# Patient Record
Sex: FEMALE | Race: WHITE | NOT HISPANIC OR LATINO | Employment: OTHER | URBAN - NONMETROPOLITAN AREA
[De-identification: names, ages, dates, MRNs, and addresses within clinical notes are randomized per-mention and may not be internally consistent; named-entity substitution may affect disease eponyms.]

---

## 2021-01-10 ENCOUNTER — IMMUNIZATION (OUTPATIENT)
Dept: OBSTETRICS AND GYNECOLOGY | Facility: CLINIC | Age: 86
End: 2021-01-10
Payer: MEDICARE

## 2021-01-10 DIAGNOSIS — Z23 NEED FOR VACCINATION: ICD-10-CM

## 2021-01-10 PROCEDURE — 91300 COVID-19, MRNA, LNP-S, PF, 30 MCG/0.3 ML DOSE VACCINE: CPT | Mod: PBBFAC

## 2021-01-31 ENCOUNTER — IMMUNIZATION (OUTPATIENT)
Dept: OBSTETRICS AND GYNECOLOGY | Facility: CLINIC | Age: 86
End: 2021-01-31
Payer: MEDICARE

## 2021-01-31 DIAGNOSIS — Z23 NEED FOR VACCINATION: Primary | ICD-10-CM

## 2021-01-31 PROCEDURE — 91300 COVID-19, MRNA, LNP-S, PF, 30 MCG/0.3 ML DOSE VACCINE: CPT | Mod: PBBFAC

## 2021-01-31 PROCEDURE — 0002A COVID-19, MRNA, LNP-S, PF, 30 MCG/0.3 ML DOSE VACCINE: CPT | Mod: PBBFAC

## 2021-04-06 ENCOUNTER — HOSPITAL ENCOUNTER (EMERGENCY)
Facility: HOSPITAL | Age: 86
Discharge: HOME | End: 2021-04-06
Attending: FAMILY MEDICINE
Payer: MEDICARE

## 2021-04-06 VITALS
OXYGEN SATURATION: 98 % | RESPIRATION RATE: 20 BRPM | DIASTOLIC BLOOD PRESSURE: 91 MMHG | TEMPERATURE: 98 F | WEIGHT: 101.81 LBS | BODY MASS INDEX: 16.36 KG/M2 | SYSTOLIC BLOOD PRESSURE: 164 MMHG | HEIGHT: 66 IN | HEART RATE: 88 BPM

## 2021-04-06 DIAGNOSIS — S16.1XXA CERVICAL STRAIN, ACUTE, INITIAL ENCOUNTER: Primary | ICD-10-CM

## 2021-04-06 PROCEDURE — 63600175 PHARM REV CODE 636 W HCPCS: Performed by: NURSE PRACTITIONER

## 2021-04-06 PROCEDURE — 96372 THER/PROPH/DIAG INJ SC/IM: CPT

## 2021-04-06 PROCEDURE — 25000003 PHARM REV CODE 250: Performed by: NURSE PRACTITIONER

## 2021-04-06 PROCEDURE — 99284 EMERGENCY DEPT VISIT MOD MDM: CPT | Mod: 25

## 2021-04-06 RX ORDER — LEVOTHYROXINE SODIUM 88 UG/1
CAPSULE ORAL
COMMUNITY
End: 2022-03-28 | Stop reason: SDUPTHER

## 2021-04-06 RX ORDER — METHOCARBAMOL 750 MG/1
750 TABLET, FILM COATED ORAL 3 TIMES DAILY
Qty: 30 TABLET | Refills: 0 | Status: SHIPPED | OUTPATIENT
Start: 2021-04-06 | End: 2021-04-16

## 2021-04-06 RX ORDER — VITAMIN B COMPLEX
1 CAPSULE ORAL DAILY
COMMUNITY
End: 2022-03-29

## 2021-04-06 RX ORDER — AA/PROT/LYSINE/METHIO/VIT C/B6 50-12.5 MG
10 TABLET ORAL DAILY
COMMUNITY
End: 2022-03-29

## 2021-04-06 RX ORDER — ACETAMINOPHEN 500 MG
5000 TABLET ORAL DAILY
COMMUNITY

## 2021-04-06 RX ORDER — METHOCARBAMOL 500 MG/1
500 TABLET, FILM COATED ORAL
Status: COMPLETED | OUTPATIENT
Start: 2021-04-06 | End: 2021-04-06

## 2021-04-06 RX ORDER — DIAZEPAM 2 MG/1
2 TABLET ORAL
COMMUNITY
End: 2022-03-29

## 2021-04-06 RX ORDER — NICOTINE POLACRILEX 2 MG
GUM BUCCAL DAILY
COMMUNITY
End: 2022-03-29

## 2021-04-06 RX ORDER — KETOROLAC TROMETHAMINE 30 MG/ML
15 INJECTION, SOLUTION INTRAMUSCULAR; INTRAVENOUS
Status: COMPLETED | OUTPATIENT
Start: 2021-04-06 | End: 2021-04-06

## 2021-04-06 RX ORDER — SIMVASTATIN 40 MG/1
40 TABLET, FILM COATED ORAL NIGHTLY
COMMUNITY
End: 2022-04-25 | Stop reason: SDUPTHER

## 2021-04-06 RX ORDER — LOSARTAN POTASSIUM 100 MG/1
50 TABLET ORAL DAILY
COMMUNITY
End: 2022-06-24

## 2021-04-06 RX ORDER — HYDROXYCHLOROQUINE SULFATE 200 MG/1
200 TABLET, FILM COATED ORAL EVERY OTHER DAY
COMMUNITY
End: 2023-12-18

## 2021-04-06 RX ORDER — NAPROXEN SODIUM 220 MG
220 TABLET ORAL DAILY PRN
COMMUNITY
End: 2022-03-29

## 2021-04-06 RX ORDER — PREDNISONE 2.5 MG/1
2.5 TABLET ORAL DAILY
COMMUNITY
End: 2022-03-28 | Stop reason: SDUPTHER

## 2021-04-06 RX ORDER — VERAPAMIL HYDROCHLORIDE 240 MG/1
240 CAPSULE, EXTENDED RELEASE ORAL DAILY
COMMUNITY
End: 2022-03-29

## 2021-04-06 RX ORDER — CALCIUM CARBONATE 600 MG
600 TABLET ORAL 2 TIMES DAILY WITH MEALS
COMMUNITY
End: 2022-03-29

## 2021-04-06 RX ORDER — METHOCARBAMOL 750 MG/1
750 TABLET, FILM COATED ORAL 3 TIMES DAILY
Qty: 30 TABLET | Refills: 0 | Status: SHIPPED | OUTPATIENT
Start: 2021-04-06 | End: 2021-04-06 | Stop reason: SDUPTHER

## 2021-04-06 RX ORDER — VENLAFAXINE HYDROCHLORIDE 75 MG/1
75 CAPSULE, EXTENDED RELEASE ORAL DAILY
COMMUNITY
End: 2022-03-29

## 2021-04-06 RX ORDER — FAMOTIDINE 20 MG/1
20 TABLET, FILM COATED ORAL 2 TIMES DAILY
COMMUNITY
End: 2022-05-08

## 2021-04-06 RX ADMIN — KETOROLAC TROMETHAMINE 15 MG: 30 INJECTION, SOLUTION INTRAMUSCULAR at 12:04

## 2021-04-06 RX ADMIN — METHOCARBAMOL TABLETS 500 MG: 500 TABLET, COATED ORAL at 12:04

## 2021-05-03 DIAGNOSIS — M54.2 NECK PAIN: Primary | ICD-10-CM

## 2021-05-06 ENCOUNTER — HOSPITAL ENCOUNTER (OUTPATIENT)
Dept: RADIOLOGY | Facility: HOSPITAL | Age: 86
Discharge: HOME | End: 2021-05-06
Attending: INTERNAL MEDICINE
Payer: MEDICARE

## 2021-05-06 DIAGNOSIS — M54.2 NECK PAIN: ICD-10-CM

## 2021-05-06 PROCEDURE — 72125 CT NECK SPINE W/O DYE: CPT | Mod: TC

## 2021-05-11 DIAGNOSIS — M54.2 CERVICALGIA: Primary | ICD-10-CM

## 2021-05-11 DIAGNOSIS — M54.50 LUMBAR PAIN: Primary | ICD-10-CM

## 2021-05-17 ENCOUNTER — HOSPITAL ENCOUNTER (INPATIENT)
Facility: HOSPITAL | Age: 86
LOS: 7 days | Discharge: NURSING, REHAB OR LONG-TERM CARE | DRG: 690 | End: 2021-05-24
Attending: EMERGENCY MEDICINE | Admitting: EMERGENCY MEDICINE
Payer: MEDICARE

## 2021-05-17 DIAGNOSIS — R53.1 WEAKNESS: ICD-10-CM

## 2021-05-17 DIAGNOSIS — R79.89 ELEVATED TROPONIN: ICD-10-CM

## 2021-05-17 DIAGNOSIS — N12 PYELONEPHRITIS: Primary | ICD-10-CM

## 2021-05-17 PROBLEM — E86.0 DEHYDRATION: Status: ACTIVE | Noted: 2021-05-17

## 2021-05-17 PROBLEM — I10 HYPERTENSION: Status: ACTIVE | Noted: 2021-05-17

## 2021-05-17 PROBLEM — M19.90 ARTHRITIS: Status: ACTIVE | Noted: 2021-05-17
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
## 2021-05-17 LAB
ALBUMIN SERPL BCP-MCNC: 3.3 G/DL (ref 3.5–5.2)
ALP SERPL-CCNC: 47 U/L (ref 55–135)
ALT SERPL W/O P-5'-P-CCNC: 25 U/L (ref 10–44)
ANION GAP SERPL CALC-SCNC: 14 MMOL/L (ref 8–16)
AST SERPL-CCNC: 24 U/L (ref 10–40)
BACTERIA #/AREA URNS HPF: ABNORMAL /HPF
BASOPHILS # BLD AUTO: 0.02 K/UL (ref 0–0.2)
BASOPHILS NFR BLD: 0.1 % (ref 0–1.9)
BILIRUB SERPL-MCNC: 0.8 MG/DL (ref 0.1–1)
BILIRUB UR QL STRIP: NEGATIVE
BUN SERPL-MCNC: 17 MG/DL (ref 8–23)
CALCIUM SERPL-MCNC: 9.2 MG/DL (ref 8.7–10.5)
CHLORIDE SERPL-SCNC: 101 MMOL/L (ref 95–110)
CLARITY UR: ABNORMAL
CO2 SERPL-SCNC: 27 MMOL/L (ref 23–29)
COLOR UR: YELLOW
CREAT SERPL-MCNC: 1 MG/DL (ref 0.5–1.4)
CTP QC/QA: YES
CTP QC/QA: YES
DIFFERENTIAL METHOD: ABNORMAL
EOSINOPHIL # BLD AUTO: 0 K/UL (ref 0–0.5)
EOSINOPHIL NFR BLD: 0 % (ref 0–8)
ERYTHROCYTE [DISTWIDTH] IN BLOOD BY AUTOMATED COUNT: 13 % (ref 11.5–14.5)
EST. GFR  (AFRICAN AMERICAN): >60 ML/MIN/1.73 M^2
EST. GFR  (NON AFRICAN AMERICAN): 52.2 ML/MIN/1.73 M^2
GLUCOSE SERPL-MCNC: 124 MG/DL (ref 70–110)
GLUCOSE UR QL STRIP: NEGATIVE
HCT VFR BLD AUTO: 34.4 % (ref 37–48.5)
HGB BLD-MCNC: 11.2 G/DL (ref 12–16)
HGB UR QL STRIP: ABNORMAL
HYALINE CASTS #/AREA URNS LPF: 1 /LPF
IMM GRANULOCYTES # BLD AUTO: 0.08 K/UL (ref 0–0.04)
IMM GRANULOCYTES NFR BLD AUTO: 0.5 % (ref 0–0.5)
KETONES UR QL STRIP: ABNORMAL
LACTATE SERPL-SCNC: 1.1 MMOL/L (ref 0.5–2.2)
LEUKOCYTE ESTERASE UR QL STRIP: ABNORMAL
LIPASE SERPL-CCNC: 47 U/L (ref 23–300)
LYMPHOCYTES # BLD AUTO: 0.4 K/UL (ref 1–4.8)
LYMPHOCYTES NFR BLD: 2.2 % (ref 18–48)
MCH RBC QN AUTO: 31.8 PG (ref 27–31)
MCHC RBC AUTO-ENTMCNC: 32.6 G/DL (ref 32–36)
MCV RBC AUTO: 98 FL (ref 82–98)
MICROSCOPIC COMMENT: ABNORMAL
MONOCYTES # BLD AUTO: 1.4 K/UL (ref 0.3–1)
MONOCYTES NFR BLD: 8.9 % (ref 4–15)
NEUTROPHILS # BLD AUTO: 13.8 K/UL (ref 1.8–7.7)
NEUTROPHILS NFR BLD: 88.3 % (ref 38–73)
NITRITE UR QL STRIP: POSITIVE
NRBC BLD-RTO: 0 /100 WBC
NT-PROBNP SERPL-MCNC: 588 PG/ML (ref 5–1800)
PH UR STRIP: 6 [PH] (ref 5–8)
PLATELET # BLD AUTO: 205 K/UL (ref 150–450)
PMV BLD AUTO: 9.9 FL (ref 9.2–12.9)
POC MOLECULAR INFLUENZA A AGN: NEGATIVE
POC MOLECULAR INFLUENZA B AGN: NEGATIVE
POTASSIUM SERPL-SCNC: 3.6 MMOL/L (ref 3.5–5.1)
PROT SERPL-MCNC: 6.6 G/DL (ref 6–8.4)
PROT UR QL STRIP: ABNORMAL
RBC # BLD AUTO: 3.52 M/UL (ref 4–5.4)
RBC #/AREA URNS HPF: 13 /HPF (ref 0–4)
SARS-COV-2 RDRP RESP QL NAA+PROBE: NEGATIVE
SODIUM SERPL-SCNC: 142 MMOL/L (ref 136–145)
SP GR UR STRIP: 1.01 (ref 1–1.03)
SQUAMOUS #/AREA URNS HPF: 1 /HPF
TROPONIN I SERPL DL<=0.01 NG/ML-MCNC: 0.12 NG/ML (ref 0–0.03)
TROPONIN I SERPL DL<=0.01 NG/ML-MCNC: 0.15 NG/ML (ref 0–0.03)
TROPONIN I SERPL DL<=0.01 NG/ML-MCNC: 0.15 NG/ML (ref 0–0.03)
URN SPEC COLLECT METH UR: ABNORMAL
UROBILINOGEN UR STRIP-ACNC: 1 EU/DL
WBC # BLD AUTO: 15.66 K/UL (ref 3.9–12.7)
WBC #/AREA URNS HPF: >100 /HPF (ref 0–5)

## 2021-05-17 PROCEDURE — 96375 TX/PRO/DX INJ NEW DRUG ADDON: CPT

## 2021-05-17 PROCEDURE — 63600175 PHARM REV CODE 636 W HCPCS: Performed by: EMERGENCY MEDICINE

## 2021-05-17 PROCEDURE — 85025 COMPLETE CBC W/AUTO DIFF WBC: CPT | Performed by: EMERGENCY MEDICINE

## 2021-05-17 PROCEDURE — 36415 COLL VENOUS BLD VENIPUNCTURE: CPT | Performed by: NURSE PRACTITIONER

## 2021-05-17 PROCEDURE — 36415 COLL VENOUS BLD VENIPUNCTURE: CPT | Performed by: EMERGENCY MEDICINE

## 2021-05-17 PROCEDURE — 87186 SC STD MICRODIL/AGAR DIL: CPT | Performed by: EMERGENCY MEDICINE

## 2021-05-17 PROCEDURE — U0002 COVID-19 LAB TEST NON-CDC: HCPCS | Performed by: EMERGENCY MEDICINE

## 2021-05-17 PROCEDURE — 25000003 PHARM REV CODE 250: Performed by: NURSE PRACTITIONER

## 2021-05-17 PROCEDURE — 96372 THER/PROPH/DIAG INJ SC/IM: CPT | Mod: 59

## 2021-05-17 PROCEDURE — 87088 URINE BACTERIA CULTURE: CPT | Performed by: EMERGENCY MEDICINE

## 2021-05-17 PROCEDURE — 83605 ASSAY OF LACTIC ACID: CPT | Performed by: EMERGENCY MEDICINE

## 2021-05-17 PROCEDURE — 83880 ASSAY OF NATRIURETIC PEPTIDE: CPT | Performed by: EMERGENCY MEDICINE

## 2021-05-17 PROCEDURE — 96365 THER/PROPH/DIAG IV INF INIT: CPT

## 2021-05-17 PROCEDURE — 93005 ELECTROCARDIOGRAM TRACING: CPT

## 2021-05-17 PROCEDURE — 63600175 PHARM REV CODE 636 W HCPCS: Performed by: NURSE PRACTITIONER

## 2021-05-17 PROCEDURE — 84484 ASSAY OF TROPONIN QUANT: CPT | Mod: 91 | Performed by: EMERGENCY MEDICINE

## 2021-05-17 PROCEDURE — 84484 ASSAY OF TROPONIN QUANT: CPT | Performed by: EMERGENCY MEDICINE

## 2021-05-17 PROCEDURE — 99285 EMERGENCY DEPT VISIT HI MDM: CPT | Mod: 25

## 2021-05-17 PROCEDURE — 87077 CULTURE AEROBIC IDENTIFY: CPT | Performed by: EMERGENCY MEDICINE

## 2021-05-17 PROCEDURE — 83690 ASSAY OF LIPASE: CPT | Performed by: EMERGENCY MEDICINE

## 2021-05-17 PROCEDURE — 81000 URINALYSIS NONAUTO W/SCOPE: CPT | Performed by: EMERGENCY MEDICINE

## 2021-05-17 PROCEDURE — 25000003 PHARM REV CODE 250: Performed by: EMERGENCY MEDICINE

## 2021-05-17 PROCEDURE — 84484 ASSAY OF TROPONIN QUANT: CPT | Mod: 91 | Performed by: NURSE PRACTITIONER

## 2021-05-17 PROCEDURE — 87086 URINE CULTURE/COLONY COUNT: CPT | Performed by: EMERGENCY MEDICINE

## 2021-05-17 PROCEDURE — 11000001 HC ACUTE MED/SURG PRIVATE ROOM

## 2021-05-17 PROCEDURE — 80053 COMPREHEN METABOLIC PANEL: CPT | Performed by: EMERGENCY MEDICINE

## 2021-05-17 PROCEDURE — 96361 HYDRATE IV INFUSION ADD-ON: CPT

## 2021-05-17 RX ORDER — LEVOTHYROXINE SODIUM 88 UG/1
88 TABLET ORAL
Status: DISCONTINUED | OUTPATIENT
Start: 2021-05-18 | End: 2021-05-24 | Stop reason: HOSPADM

## 2021-05-17 RX ORDER — MORPHINE SULFATE 2 MG/ML
2 INJECTION, SOLUTION INTRAMUSCULAR; INTRAVENOUS
Status: COMPLETED | OUTPATIENT
Start: 2021-05-17 | End: 2021-05-17

## 2021-05-17 RX ORDER — ACETAMINOPHEN 325 MG/1
650 TABLET ORAL EVERY 8 HOURS PRN
Status: DISCONTINUED | OUTPATIENT
Start: 2021-05-17 | End: 2021-05-24 | Stop reason: HOSPADM

## 2021-05-17 RX ORDER — POLYETHYLENE GLYCOL 3350 17 G/17G
17 POWDER, FOR SOLUTION ORAL DAILY
Status: DISCONTINUED | OUTPATIENT
Start: 2021-05-17 | End: 2021-05-24 | Stop reason: HOSPADM

## 2021-05-17 RX ORDER — HYDROCODONE BITARTRATE AND ACETAMINOPHEN 5; 325 MG/1; MG/1
1 TABLET ORAL EVERY 4 HOURS PRN
Status: DISCONTINUED | OUTPATIENT
Start: 2021-05-17 | End: 2021-05-24 | Stop reason: HOSPADM

## 2021-05-17 RX ORDER — VERAPAMIL HYDROCHLORIDE 240 MG/1
240 TABLET, FILM COATED, EXTENDED RELEASE ORAL NIGHTLY
Status: ON HOLD | COMMUNITY
End: 2021-07-06 | Stop reason: HOSPADM

## 2021-05-17 RX ORDER — ENOXAPARIN SODIUM 100 MG/ML
1 INJECTION SUBCUTANEOUS
Status: COMPLETED | OUTPATIENT
Start: 2021-05-17 | End: 2021-05-17

## 2021-05-17 RX ORDER — TALC
6 POWDER (GRAM) TOPICAL NIGHTLY PRN
Status: DISCONTINUED | OUTPATIENT
Start: 2021-05-17 | End: 2021-05-24 | Stop reason: HOSPADM

## 2021-05-17 RX ORDER — ONDANSETRON 2 MG/ML
4 INJECTION INTRAMUSCULAR; INTRAVENOUS EVERY 8 HOURS PRN
Status: DISCONTINUED | OUTPATIENT
Start: 2021-05-17 | End: 2021-05-24 | Stop reason: HOSPADM

## 2021-05-17 RX ORDER — MORPHINE SULFATE 4 MG/ML
4 INJECTION, SOLUTION INTRAMUSCULAR; INTRAVENOUS EVERY 4 HOURS PRN
Status: DISCONTINUED | OUTPATIENT
Start: 2021-05-17 | End: 2021-05-24 | Stop reason: HOSPADM

## 2021-05-17 RX ORDER — LOSARTAN POTASSIUM 50 MG/1
50 TABLET ORAL DAILY
COMMUNITY
End: 2022-03-28 | Stop reason: SDUPTHER

## 2021-05-17 RX ORDER — NAPROXEN SODIUM 220 MG/1
324 TABLET, FILM COATED ORAL
Status: COMPLETED | OUTPATIENT
Start: 2021-05-17 | End: 2021-05-17

## 2021-05-17 RX ORDER — SIMVASTATIN 40 MG/1
40 TABLET, FILM COATED ORAL NIGHTLY
Status: DISCONTINUED | OUTPATIENT
Start: 2021-05-17 | End: 2021-05-24 | Stop reason: HOSPADM

## 2021-05-17 RX ORDER — VERAPAMIL HYDROCHLORIDE 120 MG/1
240 TABLET, FILM COATED, EXTENDED RELEASE ORAL NIGHTLY
Status: DISCONTINUED | OUTPATIENT
Start: 2021-05-17 | End: 2021-05-24 | Stop reason: HOSPADM

## 2021-05-17 RX ORDER — LEVOTHYROXINE SODIUM 88 UG/1
88 TABLET ORAL
COMMUNITY
End: 2022-08-15

## 2021-05-17 RX ORDER — SODIUM CHLORIDE 9 MG/ML
INJECTION, SOLUTION INTRAVENOUS CONTINUOUS
Status: DISCONTINUED | OUTPATIENT
Start: 2021-05-17 | End: 2021-05-21

## 2021-05-17 RX ORDER — VENLAFAXINE 75 MG/1
75 TABLET ORAL DAILY
Status: ON HOLD | COMMUNITY
End: 2021-07-06 | Stop reason: HOSPADM

## 2021-05-17 RX ORDER — ONDANSETRON 2 MG/ML
8 INJECTION INTRAMUSCULAR; INTRAVENOUS
Status: COMPLETED | OUTPATIENT
Start: 2021-05-17 | End: 2021-05-17

## 2021-05-17 RX ORDER — SIMVASTATIN 40 MG/1
40 TABLET, FILM COATED ORAL NIGHTLY
COMMUNITY
End: 2022-03-28 | Stop reason: SDUPTHER

## 2021-05-17 RX ORDER — LOSARTAN POTASSIUM 50 MG/1
100 TABLET ORAL DAILY
Status: DISCONTINUED | OUTPATIENT
Start: 2021-05-17 | End: 2021-05-24 | Stop reason: HOSPADM

## 2021-05-17 RX ORDER — SUCRALFATE 1 G/1
1 TABLET ORAL
Status: DISCONTINUED | OUTPATIENT
Start: 2021-05-17 | End: 2021-05-21

## 2021-05-17 RX ORDER — HYDROXYCHLOROQUINE SULFATE 200 MG/1
TABLET, FILM COATED ORAL EVERY OTHER DAY
COMMUNITY
End: 2022-03-28 | Stop reason: SDUPTHER

## 2021-05-17 RX ORDER — SODIUM CHLORIDE 0.9 % (FLUSH) 0.9 %
10 SYRINGE (ML) INJECTION
Status: DISCONTINUED | OUTPATIENT
Start: 2021-05-17 | End: 2021-05-24 | Stop reason: HOSPADM

## 2021-05-17 RX ORDER — VENLAFAXINE 75 MG/1
75 TABLET ORAL DAILY
Status: DISCONTINUED | OUTPATIENT
Start: 2021-05-17 | End: 2021-05-24 | Stop reason: HOSPADM

## 2021-05-17 RX ORDER — ONDANSETRON 4 MG/1
8 TABLET, ORALLY DISINTEGRATING ORAL EVERY 8 HOURS PRN
Status: DISCONTINUED | OUTPATIENT
Start: 2021-05-17 | End: 2021-05-24 | Stop reason: HOSPADM

## 2021-05-17 RX ADMIN — Medication 6 MG: at 09:05

## 2021-05-17 RX ADMIN — ASPIRIN 324 MG: 81 TABLET, CHEWABLE ORAL at 01:05

## 2021-05-17 RX ADMIN — CEFTRIAXONE 1 G: 1 INJECTION, SOLUTION INTRAVENOUS at 01:05

## 2021-05-17 RX ADMIN — LOSARTAN POTASSIUM 100 MG: 50 TABLET, FILM COATED ORAL at 10:05

## 2021-05-17 RX ADMIN — CEFTRIAXONE SODIUM 1 G: 1 INJECTION, POWDER, FOR SOLUTION INTRAMUSCULAR; INTRAVENOUS at 02:05

## 2021-05-17 RX ADMIN — VERAPAMIL HYDROCHLORIDE 240 MG: 120 TABLET, FILM COATED, EXTENDED RELEASE ORAL at 09:05

## 2021-05-17 RX ADMIN — SODIUM CHLORIDE: 0.9 INJECTION, SOLUTION INTRAVENOUS at 10:05

## 2021-05-17 RX ADMIN — VENLAFAXINE 75 MG: 75 TABLET ORAL at 10:05

## 2021-05-17 RX ADMIN — SIMVASTATIN 40 MG: 40 TABLET, FILM COATED ORAL at 09:05

## 2021-05-17 RX ADMIN — SUCRALFATE 1 G: 1 TABLET ORAL at 09:05

## 2021-05-17 RX ADMIN — HYDROCODONE BITARTRATE AND ACETAMINOPHEN 1 TABLET: 5; 325 TABLET ORAL at 07:05

## 2021-05-17 RX ADMIN — SUCRALFATE 1 G: 1 TABLET ORAL at 05:05

## 2021-05-17 RX ADMIN — SODIUM CHLORIDE 1000 ML: 0.9 INJECTION, SOLUTION INTRAVENOUS at 12:05

## 2021-05-17 RX ADMIN — ONDANSETRON 8 MG: 2 INJECTION INTRAMUSCULAR; INTRAVENOUS at 12:05

## 2021-05-17 RX ADMIN — ENOXAPARIN SODIUM 50 MG: 60 INJECTION SUBCUTANEOUS at 02:05

## 2021-05-17 RX ADMIN — POLYETHYLENE GLYCOL (3350) 17 G: 17 POWDER, FOR SOLUTION ORAL at 10:05

## 2021-05-17 RX ADMIN — MORPHINE SULFATE 2 MG: 2 INJECTION, SOLUTION INTRAMUSCULAR; INTRAVENOUS at 12:05

## 2021-05-17 RX ADMIN — SUCRALFATE 1 G: 1 TABLET ORAL at 10:05

## 2021-05-18 LAB
ALBUMIN SERPL BCP-MCNC: 2.7 G/DL (ref 3.5–5.2)
ALP SERPL-CCNC: 44 U/L (ref 55–135)
ALT SERPL W/O P-5'-P-CCNC: 22 U/L (ref 10–44)
ANION GAP SERPL CALC-SCNC: 7 MMOL/L (ref 8–16)
AST SERPL-CCNC: 39 U/L (ref 10–40)
BASOPHILS # BLD AUTO: 0.02 K/UL (ref 0–0.2)
BASOPHILS NFR BLD: 0.2 % (ref 0–1.9)
BILIRUB SERPL-MCNC: 0.2 MG/DL (ref 0.1–1)
BUN SERPL-MCNC: 12 MG/DL (ref 8–23)
CALCIUM SERPL-MCNC: 8.7 MG/DL (ref 8.7–10.5)
CHLORIDE SERPL-SCNC: 109 MMOL/L (ref 95–110)
CO2 SERPL-SCNC: 27 MMOL/L (ref 23–29)
CREAT SERPL-MCNC: 0.7 MG/DL (ref 0.5–1.4)
DIFFERENTIAL METHOD: ABNORMAL
EOSINOPHIL # BLD AUTO: 0 K/UL (ref 0–0.5)
EOSINOPHIL NFR BLD: 0.2 % (ref 0–8)
ERYTHROCYTE [DISTWIDTH] IN BLOOD BY AUTOMATED COUNT: 12.9 % (ref 11.5–14.5)
EST. GFR  (AFRICAN AMERICAN): >60 ML/MIN/1.73 M^2
EST. GFR  (NON AFRICAN AMERICAN): >60 ML/MIN/1.73 M^2
GLUCOSE SERPL-MCNC: 88 MG/DL (ref 70–110)
HCT VFR BLD AUTO: 31.1 % (ref 37–48.5)
HGB BLD-MCNC: 9.9 G/DL (ref 12–16)
IMM GRANULOCYTES # BLD AUTO: 0.03 K/UL (ref 0–0.04)
IMM GRANULOCYTES NFR BLD AUTO: 0.4 % (ref 0–0.5)
LYMPHOCYTES # BLD AUTO: 0.4 K/UL (ref 1–4.8)
LYMPHOCYTES NFR BLD: 4.9 % (ref 18–48)
MAGNESIUM SERPL-MCNC: 1.7 MG/DL (ref 1.6–2.6)
MCH RBC QN AUTO: 31.5 PG (ref 27–31)
MCHC RBC AUTO-ENTMCNC: 31.8 G/DL (ref 32–36)
MCV RBC AUTO: 99 FL (ref 82–98)
MONOCYTES # BLD AUTO: 0.7 K/UL (ref 0.3–1)
MONOCYTES NFR BLD: 8.4 % (ref 4–15)
NEUTROPHILS # BLD AUTO: 7.1 K/UL (ref 1.8–7.7)
NEUTROPHILS NFR BLD: 85.9 % (ref 38–73)
NRBC BLD-RTO: 0 /100 WBC
PLATELET # BLD AUTO: 165 K/UL (ref 150–450)
PMV BLD AUTO: 10.6 FL (ref 9.2–12.9)
POCT GLUCOSE: 103 MG/DL (ref 70–110)
POTASSIUM SERPL-SCNC: 3.5 MMOL/L (ref 3.5–5.1)
PROT SERPL-MCNC: 5.8 G/DL (ref 6–8.4)
RBC # BLD AUTO: 3.14 M/UL (ref 4–5.4)
SODIUM SERPL-SCNC: 143 MMOL/L (ref 136–145)
WBC # BLD AUTO: 8.29 K/UL (ref 3.9–12.7)

## 2021-05-18 PROCEDURE — 80053 COMPREHEN METABOLIC PANEL: CPT | Performed by: NURSE PRACTITIONER

## 2021-05-18 PROCEDURE — 97162 PT EVAL MOD COMPLEX 30 MIN: CPT

## 2021-05-18 PROCEDURE — 63600175 PHARM REV CODE 636 W HCPCS: Performed by: NURSE PRACTITIONER

## 2021-05-18 PROCEDURE — 83735 ASSAY OF MAGNESIUM: CPT | Performed by: NURSE PRACTITIONER

## 2021-05-18 PROCEDURE — 97165 OT EVAL LOW COMPLEX 30 MIN: CPT

## 2021-05-18 PROCEDURE — 11000001 HC ACUTE MED/SURG PRIVATE ROOM

## 2021-05-18 PROCEDURE — 85025 COMPLETE CBC W/AUTO DIFF WBC: CPT | Performed by: NURSE PRACTITIONER

## 2021-05-18 PROCEDURE — 97530 THERAPEUTIC ACTIVITIES: CPT

## 2021-05-18 PROCEDURE — 25000003 PHARM REV CODE 250: Performed by: NURSE PRACTITIONER

## 2021-05-18 RX ADMIN — Medication 6 MG: at 09:05

## 2021-05-18 RX ADMIN — LOSARTAN POTASSIUM 100 MG: 50 TABLET, FILM COATED ORAL at 08:05

## 2021-05-18 RX ADMIN — LEVOTHYROXINE SODIUM 88 MCG: 88 TABLET ORAL at 05:05

## 2021-05-18 RX ADMIN — SUCRALFATE 1 G: 1 TABLET ORAL at 05:05

## 2021-05-18 RX ADMIN — SUCRALFATE 1 G: 1 TABLET ORAL at 11:05

## 2021-05-18 RX ADMIN — SUCRALFATE 1 G: 1 TABLET ORAL at 04:05

## 2021-05-18 RX ADMIN — CEFTRIAXONE SODIUM 1 G: 1 INJECTION, POWDER, FOR SOLUTION INTRAMUSCULAR; INTRAVENOUS at 02:05

## 2021-05-18 RX ADMIN — CEFTRIAXONE SODIUM 1 G: 1 INJECTION, POWDER, FOR SOLUTION INTRAMUSCULAR; INTRAVENOUS at 01:05

## 2021-05-18 RX ADMIN — POLYETHYLENE GLYCOL (3350) 17 G: 17 POWDER, FOR SOLUTION ORAL at 08:05

## 2021-05-18 RX ADMIN — SIMVASTATIN 40 MG: 40 TABLET, FILM COATED ORAL at 09:05

## 2021-05-18 RX ADMIN — VERAPAMIL HYDROCHLORIDE 240 MG: 120 TABLET, FILM COATED, EXTENDED RELEASE ORAL at 09:05

## 2021-05-18 RX ADMIN — SUCRALFATE 1 G: 1 TABLET ORAL at 09:05

## 2021-05-18 RX ADMIN — HYDROCODONE BITARTRATE AND ACETAMINOPHEN 1 TABLET: 5; 325 TABLET ORAL at 02:05

## 2021-05-18 RX ADMIN — VENLAFAXINE 75 MG: 75 TABLET ORAL at 08:05

## 2021-05-18 RX ADMIN — SODIUM CHLORIDE: 0.9 INJECTION, SOLUTION INTRAVENOUS at 01:05

## 2021-05-19 LAB
ALBUMIN SERPL BCP-MCNC: 2.4 G/DL (ref 3.5–5.2)
ALP SERPL-CCNC: 43 U/L (ref 55–135)
ALT SERPL W/O P-5'-P-CCNC: 21 U/L (ref 10–44)
ANION GAP SERPL CALC-SCNC: 8 MMOL/L (ref 8–16)
AST SERPL-CCNC: 32 U/L (ref 10–40)
BACTERIA UR CULT: ABNORMAL
BASOPHILS # BLD AUTO: 0.03 K/UL (ref 0–0.2)
BASOPHILS NFR BLD: 0.5 % (ref 0–1.9)
BILIRUB SERPL-MCNC: 0.2 MG/DL (ref 0.1–1)
BUN SERPL-MCNC: 10 MG/DL (ref 8–23)
CALCIUM SERPL-MCNC: 8.5 MG/DL (ref 8.7–10.5)
CHLORIDE SERPL-SCNC: 107 MMOL/L (ref 95–110)
CO2 SERPL-SCNC: 26 MMOL/L (ref 23–29)
CREAT SERPL-MCNC: 0.6 MG/DL (ref 0.5–1.4)
DIFFERENTIAL METHOD: ABNORMAL
EOSINOPHIL # BLD AUTO: 0.1 K/UL (ref 0–0.5)
EOSINOPHIL NFR BLD: 1 % (ref 0–8)
ERYTHROCYTE [DISTWIDTH] IN BLOOD BY AUTOMATED COUNT: 12.5 % (ref 11.5–14.5)
EST. GFR  (AFRICAN AMERICAN): >60 ML/MIN/1.73 M^2
EST. GFR  (NON AFRICAN AMERICAN): >60 ML/MIN/1.73 M^2
GLUCOSE SERPL-MCNC: 100 MG/DL (ref 70–110)
HCT VFR BLD AUTO: 28.6 % (ref 37–48.5)
HGB BLD-MCNC: 9.3 G/DL (ref 12–16)
IMM GRANULOCYTES # BLD AUTO: 0.01 K/UL (ref 0–0.04)
IMM GRANULOCYTES NFR BLD AUTO: 0.2 % (ref 0–0.5)
LYMPHOCYTES # BLD AUTO: 0.5 K/UL (ref 1–4.8)
LYMPHOCYTES NFR BLD: 7.2 % (ref 18–48)
MAGNESIUM SERPL-MCNC: 1.5 MG/DL (ref 1.6–2.6)
MCH RBC QN AUTO: 31.2 PG (ref 27–31)
MCHC RBC AUTO-ENTMCNC: 32.5 G/DL (ref 32–36)
MCV RBC AUTO: 96 FL (ref 82–98)
MONOCYTES # BLD AUTO: 0.8 K/UL (ref 0.3–1)
MONOCYTES NFR BLD: 12.1 % (ref 4–15)
NEUTROPHILS # BLD AUTO: 5 K/UL (ref 1.8–7.7)
NEUTROPHILS NFR BLD: 79 % (ref 38–73)
NRBC BLD-RTO: 0 /100 WBC
PLATELET # BLD AUTO: 176 K/UL (ref 150–450)
PMV BLD AUTO: 10.5 FL (ref 9.2–12.9)
POTASSIUM SERPL-SCNC: 3.2 MMOL/L (ref 3.5–5.1)
PROT SERPL-MCNC: 5.5 G/DL (ref 6–8.4)
RBC # BLD AUTO: 2.98 M/UL (ref 4–5.4)
SODIUM SERPL-SCNC: 141 MMOL/L (ref 136–145)
WBC # BLD AUTO: 6.26 K/UL (ref 3.9–12.7)

## 2021-05-19 PROCEDURE — 25000003 PHARM REV CODE 250: Performed by: INTERNAL MEDICINE

## 2021-05-19 PROCEDURE — 97530 THERAPEUTIC ACTIVITIES: CPT | Mod: CO

## 2021-05-19 PROCEDURE — 97116 GAIT TRAINING THERAPY: CPT

## 2021-05-19 PROCEDURE — 97530 THERAPEUTIC ACTIVITIES: CPT

## 2021-05-19 PROCEDURE — 80053 COMPREHEN METABOLIC PANEL: CPT | Performed by: NURSE PRACTITIONER

## 2021-05-19 PROCEDURE — 36415 COLL VENOUS BLD VENIPUNCTURE: CPT | Performed by: NURSE PRACTITIONER

## 2021-05-19 PROCEDURE — 83735 ASSAY OF MAGNESIUM: CPT | Performed by: NURSE PRACTITIONER

## 2021-05-19 PROCEDURE — 11000001 HC ACUTE MED/SURG PRIVATE ROOM

## 2021-05-19 PROCEDURE — 85025 COMPLETE CBC W/AUTO DIFF WBC: CPT | Performed by: NURSE PRACTITIONER

## 2021-05-19 PROCEDURE — 63600175 PHARM REV CODE 636 W HCPCS: Performed by: NURSE PRACTITIONER

## 2021-05-19 PROCEDURE — 25000003 PHARM REV CODE 250: Performed by: NURSE PRACTITIONER

## 2021-05-19 RX ADMIN — SIMVASTATIN 40 MG: 40 TABLET, FILM COATED ORAL at 08:05

## 2021-05-19 RX ADMIN — SODIUM CHLORIDE: 0.9 INJECTION, SOLUTION INTRAVENOUS at 04:05

## 2021-05-19 RX ADMIN — CEFTRIAXONE SODIUM 1 G: 1 INJECTION, POWDER, FOR SOLUTION INTRAMUSCULAR; INTRAVENOUS at 02:05

## 2021-05-19 RX ADMIN — VENLAFAXINE 75 MG: 75 TABLET ORAL at 08:05

## 2021-05-19 RX ADMIN — SUCRALFATE 1 G: 1 TABLET ORAL at 08:05

## 2021-05-19 RX ADMIN — SUCRALFATE 1 G: 1 TABLET ORAL at 11:05

## 2021-05-19 RX ADMIN — POLYETHYLENE GLYCOL (3350) 17 G: 17 POWDER, FOR SOLUTION ORAL at 08:05

## 2021-05-19 RX ADMIN — SUCRALFATE 1 G: 1 TABLET ORAL at 06:05

## 2021-05-19 RX ADMIN — LEVOTHYROXINE SODIUM 88 MCG: 88 TABLET ORAL at 06:05

## 2021-05-19 RX ADMIN — SUCRALFATE 1 G: 1 TABLET ORAL at 03:05

## 2021-05-19 RX ADMIN — ACETAMINOPHEN 650 MG: 325 TABLET ORAL at 03:05

## 2021-05-19 RX ADMIN — VERAPAMIL HYDROCHLORIDE 240 MG: 120 TABLET, FILM COATED, EXTENDED RELEASE ORAL at 08:05

## 2021-05-19 RX ADMIN — ONDANSETRON 8 MG: 4 TABLET, ORALLY DISINTEGRATING ORAL at 11:05

## 2021-05-19 RX ADMIN — LOSARTAN POTASSIUM 100 MG: 50 TABLET, FILM COATED ORAL at 08:05

## 2021-05-20 PROBLEM — E87.6 HYPOKALEMIA: Status: ACTIVE | Noted: 2021-05-20

## 2021-05-20 PROBLEM — R05.9 COUGH: Status: ACTIVE | Noted: 2021-05-20

## 2021-05-20 LAB
ALBUMIN SERPL BCP-MCNC: 2.4 G/DL (ref 3.5–5.2)
ALP SERPL-CCNC: 47 U/L (ref 55–135)
ALT SERPL W/O P-5'-P-CCNC: 21 U/L (ref 10–44)
ANION GAP SERPL CALC-SCNC: 5 MMOL/L (ref 8–16)
AST SERPL-CCNC: 28 U/L (ref 10–40)
BASOPHILS # BLD AUTO: 0.01 K/UL (ref 0–0.2)
BASOPHILS NFR BLD: 0.1 % (ref 0–1.9)
BILIRUB SERPL-MCNC: 0.2 MG/DL (ref 0.1–1)
BUN SERPL-MCNC: 9 MG/DL (ref 8–23)
CALCIUM SERPL-MCNC: 8.7 MG/DL (ref 8.7–10.5)
CHLORIDE SERPL-SCNC: 112 MMOL/L (ref 95–110)
CO2 SERPL-SCNC: 28 MMOL/L (ref 23–29)
CREAT SERPL-MCNC: 0.6 MG/DL (ref 0.5–1.4)
DIFFERENTIAL METHOD: ABNORMAL
EOSINOPHIL # BLD AUTO: 0.2 K/UL (ref 0–0.5)
EOSINOPHIL NFR BLD: 3 % (ref 0–8)
ERYTHROCYTE [DISTWIDTH] IN BLOOD BY AUTOMATED COUNT: 12.6 % (ref 11.5–14.5)
EST. GFR  (AFRICAN AMERICAN): >60 ML/MIN/1.73 M^2
EST. GFR  (NON AFRICAN AMERICAN): >60 ML/MIN/1.73 M^2
GLUCOSE SERPL-MCNC: 105 MG/DL (ref 70–110)
HCT VFR BLD AUTO: 29.3 % (ref 37–48.5)
HGB BLD-MCNC: 9.7 G/DL (ref 12–16)
IMM GRANULOCYTES # BLD AUTO: 0.02 K/UL (ref 0–0.04)
IMM GRANULOCYTES NFR BLD AUTO: 0.3 % (ref 0–0.5)
LYMPHOCYTES # BLD AUTO: 0.6 K/UL (ref 1–4.8)
LYMPHOCYTES NFR BLD: 7.4 % (ref 18–48)
MAGNESIUM SERPL-MCNC: 1.7 MG/DL (ref 1.6–2.6)
MCH RBC QN AUTO: 31.2 PG (ref 27–31)
MCHC RBC AUTO-ENTMCNC: 33.1 G/DL (ref 32–36)
MCV RBC AUTO: 94 FL (ref 82–98)
MONOCYTES # BLD AUTO: 0.9 K/UL (ref 0.3–1)
MONOCYTES NFR BLD: 12.5 % (ref 4–15)
NEUTROPHILS # BLD AUTO: 5.7 K/UL (ref 1.8–7.7)
NEUTROPHILS NFR BLD: 76.7 % (ref 38–73)
NRBC BLD-RTO: 0 /100 WBC
PLATELET # BLD AUTO: 205 K/UL (ref 150–450)
PMV BLD AUTO: 10.8 FL (ref 9.2–12.9)
POTASSIUM SERPL-SCNC: 3.1 MMOL/L (ref 3.5–5.1)
PROT SERPL-MCNC: 5.5 G/DL (ref 6–8.4)
RBC # BLD AUTO: 3.11 M/UL (ref 4–5.4)
SODIUM SERPL-SCNC: 145 MMOL/L (ref 136–145)
WBC # BLD AUTO: 7.43 K/UL (ref 3.9–12.7)

## 2021-05-20 PROCEDURE — 97116 GAIT TRAINING THERAPY: CPT

## 2021-05-20 PROCEDURE — 25000003 PHARM REV CODE 250: Performed by: NURSE PRACTITIONER

## 2021-05-20 PROCEDURE — 80053 COMPREHEN METABOLIC PANEL: CPT | Performed by: NURSE PRACTITIONER

## 2021-05-20 PROCEDURE — 97530 THERAPEUTIC ACTIVITIES: CPT | Mod: CO

## 2021-05-20 PROCEDURE — 97530 THERAPEUTIC ACTIVITIES: CPT

## 2021-05-20 PROCEDURE — 36415 COLL VENOUS BLD VENIPUNCTURE: CPT | Performed by: NURSE PRACTITIONER

## 2021-05-20 PROCEDURE — 83735 ASSAY OF MAGNESIUM: CPT | Performed by: NURSE PRACTITIONER

## 2021-05-20 PROCEDURE — 25000003 PHARM REV CODE 250: Performed by: INTERNAL MEDICINE

## 2021-05-20 PROCEDURE — 63600175 PHARM REV CODE 636 W HCPCS: Performed by: NURSE PRACTITIONER

## 2021-05-20 PROCEDURE — 85025 COMPLETE CBC W/AUTO DIFF WBC: CPT | Performed by: NURSE PRACTITIONER

## 2021-05-20 PROCEDURE — 11000001 HC ACUTE MED/SURG PRIVATE ROOM

## 2021-05-20 RX ORDER — GUAIFENESIN/DEXTROMETHORPHAN 100-10MG/5
10 SYRUP ORAL EVERY 4 HOURS PRN
Status: DISCONTINUED | OUTPATIENT
Start: 2021-05-20 | End: 2021-05-24 | Stop reason: HOSPADM

## 2021-05-20 RX ORDER — BENZONATATE 100 MG/1
100 CAPSULE ORAL 3 TIMES DAILY PRN
Status: DISCONTINUED | OUTPATIENT
Start: 2021-05-20 | End: 2021-05-24 | Stop reason: HOSPADM

## 2021-05-20 RX ORDER — CETIRIZINE HYDROCHLORIDE 10 MG/1
10 TABLET ORAL DAILY
Status: DISCONTINUED | OUTPATIENT
Start: 2021-05-20 | End: 2021-05-24 | Stop reason: HOSPADM

## 2021-05-20 RX ADMIN — HYDROCODONE BITARTRATE AND ACETAMINOPHEN 1 TABLET: 5; 325 TABLET ORAL at 06:05

## 2021-05-20 RX ADMIN — LEVOTHYROXINE SODIUM 88 MCG: 88 TABLET ORAL at 06:05

## 2021-05-20 RX ADMIN — HYDROCODONE BITARTRATE AND ACETAMINOPHEN 1 TABLET: 5; 325 TABLET ORAL at 09:05

## 2021-05-20 RX ADMIN — POTASSIUM BICARBONATE 20 MEQ: 391 TABLET, EFFERVESCENT ORAL at 09:05

## 2021-05-20 RX ADMIN — POLYETHYLENE GLYCOL (3350) 17 G: 17 POWDER, FOR SOLUTION ORAL at 10:05

## 2021-05-20 RX ADMIN — LOSARTAN POTASSIUM 100 MG: 50 TABLET, FILM COATED ORAL at 10:05

## 2021-05-20 RX ADMIN — SUCRALFATE 1 G: 1 TABLET ORAL at 09:05

## 2021-05-20 RX ADMIN — CEFTRIAXONE SODIUM 1 G: 1 INJECTION, POWDER, FOR SOLUTION INTRAMUSCULAR; INTRAVENOUS at 01:05

## 2021-05-20 RX ADMIN — SUCRALFATE 1 G: 1 TABLET ORAL at 04:05

## 2021-05-20 RX ADMIN — SIMVASTATIN 40 MG: 40 TABLET, FILM COATED ORAL at 09:05

## 2021-05-20 RX ADMIN — CETIRIZINE HYDROCHLORIDE 10 MG: 10 TABLET, FILM COATED ORAL at 10:05

## 2021-05-20 RX ADMIN — CEFTRIAXONE SODIUM 1 G: 1 INJECTION, POWDER, FOR SOLUTION INTRAMUSCULAR; INTRAVENOUS at 02:05

## 2021-05-20 RX ADMIN — BENZONATATE 100 MG: 100 CAPSULE ORAL at 10:05

## 2021-05-20 RX ADMIN — SUCRALFATE 1 G: 1 TABLET ORAL at 10:05

## 2021-05-20 RX ADMIN — POTASSIUM BICARBONATE 20 MEQ: 391 TABLET, EFFERVESCENT ORAL at 10:05

## 2021-05-20 RX ADMIN — SODIUM CHLORIDE: 0.9 INJECTION, SOLUTION INTRAVENOUS at 01:05

## 2021-05-20 RX ADMIN — VENLAFAXINE 75 MG: 75 TABLET ORAL at 10:05

## 2021-05-20 RX ADMIN — SUCRALFATE 1 G: 1 TABLET ORAL at 06:05

## 2021-05-20 RX ADMIN — VERAPAMIL HYDROCHLORIDE 240 MG: 120 TABLET, FILM COATED, EXTENDED RELEASE ORAL at 09:05

## 2021-05-21 LAB
ALBUMIN SERPL BCP-MCNC: 2.4 G/DL (ref 3.5–5.2)
ALP SERPL-CCNC: 51 U/L (ref 55–135)
ALT SERPL W/O P-5'-P-CCNC: 20 U/L (ref 10–44)
ANION GAP SERPL CALC-SCNC: 5 MMOL/L (ref 8–16)
AST SERPL-CCNC: 23 U/L (ref 10–40)
BASOPHILS # BLD AUTO: 0.04 K/UL (ref 0–0.2)
BASOPHILS NFR BLD: 0.5 % (ref 0–1.9)
BILIRUB SERPL-MCNC: 0.2 MG/DL (ref 0.1–1)
BUN SERPL-MCNC: 9 MG/DL (ref 8–23)
CALCIUM SERPL-MCNC: 8.8 MG/DL (ref 8.7–10.5)
CHLORIDE SERPL-SCNC: 109 MMOL/L (ref 95–110)
CO2 SERPL-SCNC: 31 MMOL/L (ref 23–29)
CREAT SERPL-MCNC: 0.5 MG/DL (ref 0.5–1.4)
DIFFERENTIAL METHOD: ABNORMAL
EOSINOPHIL # BLD AUTO: 0.3 K/UL (ref 0–0.5)
EOSINOPHIL NFR BLD: 4.6 % (ref 0–8)
ERYTHROCYTE [DISTWIDTH] IN BLOOD BY AUTOMATED COUNT: 12.5 % (ref 11.5–14.5)
EST. GFR  (AFRICAN AMERICAN): >60 ML/MIN/1.73 M^2
EST. GFR  (NON AFRICAN AMERICAN): >60 ML/MIN/1.73 M^2
GLUCOSE SERPL-MCNC: 84 MG/DL (ref 70–110)
HCT VFR BLD AUTO: 30.6 % (ref 37–48.5)
HGB BLD-MCNC: 9.9 G/DL (ref 12–16)
IMM GRANULOCYTES # BLD AUTO: 0.03 K/UL (ref 0–0.04)
IMM GRANULOCYTES NFR BLD AUTO: 0.4 % (ref 0–0.5)
LYMPHOCYTES # BLD AUTO: 1.2 K/UL (ref 1–4.8)
LYMPHOCYTES NFR BLD: 16.6 % (ref 18–48)
MAGNESIUM SERPL-MCNC: 1.7 MG/DL (ref 1.6–2.6)
MCH RBC QN AUTO: 30.9 PG (ref 27–31)
MCHC RBC AUTO-ENTMCNC: 32.4 G/DL (ref 32–36)
MCV RBC AUTO: 96 FL (ref 82–98)
MONOCYTES # BLD AUTO: 0.9 K/UL (ref 0.3–1)
MONOCYTES NFR BLD: 12.3 % (ref 4–15)
NEUTROPHILS # BLD AUTO: 4.9 K/UL (ref 1.8–7.7)
NEUTROPHILS NFR BLD: 65.6 % (ref 38–73)
NRBC BLD-RTO: 0 /100 WBC
PLATELET # BLD AUTO: 244 K/UL (ref 150–450)
PMV BLD AUTO: 10.8 FL (ref 9.2–12.9)
POTASSIUM SERPL-SCNC: 3.2 MMOL/L (ref 3.5–5.1)
PROT SERPL-MCNC: 5.4 G/DL (ref 6–8.4)
RBC # BLD AUTO: 3.2 M/UL (ref 4–5.4)
SODIUM SERPL-SCNC: 145 MMOL/L (ref 136–145)
WBC # BLD AUTO: 7.47 K/UL (ref 3.9–12.7)

## 2021-05-21 PROCEDURE — 97116 GAIT TRAINING THERAPY: CPT

## 2021-05-21 PROCEDURE — 36415 COLL VENOUS BLD VENIPUNCTURE: CPT | Performed by: NURSE PRACTITIONER

## 2021-05-21 PROCEDURE — 97530 THERAPEUTIC ACTIVITIES: CPT

## 2021-05-21 PROCEDURE — 83735 ASSAY OF MAGNESIUM: CPT | Performed by: NURSE PRACTITIONER

## 2021-05-21 PROCEDURE — 25000003 PHARM REV CODE 250: Performed by: NURSE PRACTITIONER

## 2021-05-21 PROCEDURE — 85025 COMPLETE CBC W/AUTO DIFF WBC: CPT | Performed by: NURSE PRACTITIONER

## 2021-05-21 PROCEDURE — 25000003 PHARM REV CODE 250: Performed by: INTERNAL MEDICINE

## 2021-05-21 PROCEDURE — 11000001 HC ACUTE MED/SURG PRIVATE ROOM

## 2021-05-21 PROCEDURE — 63600175 PHARM REV CODE 636 W HCPCS: Performed by: NURSE PRACTITIONER

## 2021-05-21 PROCEDURE — 80053 COMPREHEN METABOLIC PANEL: CPT | Performed by: NURSE PRACTITIONER

## 2021-05-21 RX ORDER — SUCRALFATE 1 G/10ML
1 SUSPENSION ORAL
Status: DISCONTINUED | OUTPATIENT
Start: 2021-05-21 | End: 2021-05-24 | Stop reason: HOSPADM

## 2021-05-21 RX ORDER — NYSTATIN 100000 [USP'U]/ML
500000 SUSPENSION ORAL 4 TIMES DAILY
Status: DISCONTINUED | OUTPATIENT
Start: 2021-05-21 | End: 2021-05-24

## 2021-05-21 RX ADMIN — HYDROCODONE BITARTRATE AND ACETAMINOPHEN 1 TABLET: 5; 325 TABLET ORAL at 08:05

## 2021-05-21 RX ADMIN — SUCRALFATE 1 G: 1 TABLET ORAL at 05:05

## 2021-05-21 RX ADMIN — SUCRALFATE 1 G: 1 SUSPENSION ORAL at 08:05

## 2021-05-21 RX ADMIN — NYSTATIN 500000 UNITS: 100000 SUSPENSION ORAL at 08:05

## 2021-05-21 RX ADMIN — SUCRALFATE 1 G: 1 TABLET ORAL at 11:05

## 2021-05-21 RX ADMIN — POTASSIUM BICARBONATE 20 MEQ: 391 TABLET, EFFERVESCENT ORAL at 08:05

## 2021-05-21 RX ADMIN — NYSTATIN 500000 UNITS: 100000 SUSPENSION ORAL at 03:05

## 2021-05-21 RX ADMIN — ACETAMINOPHEN 650 MG: 325 TABLET ORAL at 08:05

## 2021-05-21 RX ADMIN — POLYETHYLENE GLYCOL (3350) 17 G: 17 POWDER, FOR SOLUTION ORAL at 08:05

## 2021-05-21 RX ADMIN — CEFTRIAXONE SODIUM 1 G: 1 INJECTION, POWDER, FOR SOLUTION INTRAMUSCULAR; INTRAVENOUS at 02:05

## 2021-05-21 RX ADMIN — SIMVASTATIN 40 MG: 40 TABLET, FILM COATED ORAL at 08:05

## 2021-05-21 RX ADMIN — VERAPAMIL HYDROCHLORIDE 240 MG: 120 TABLET, FILM COATED, EXTENDED RELEASE ORAL at 08:05

## 2021-05-21 RX ADMIN — SUCRALFATE 1 G: 1 TABLET ORAL at 07:05

## 2021-05-21 RX ADMIN — LOSARTAN POTASSIUM 100 MG: 50 TABLET, FILM COATED ORAL at 08:05

## 2021-05-21 RX ADMIN — Medication 6 MG: at 08:05

## 2021-05-21 RX ADMIN — CEFTRIAXONE SODIUM 1 G: 1 INJECTION, POWDER, FOR SOLUTION INTRAMUSCULAR; INTRAVENOUS at 03:05

## 2021-05-21 RX ADMIN — NYSTATIN 500000 UNITS: 100000 SUSPENSION ORAL at 11:05

## 2021-05-21 RX ADMIN — NYSTATIN 500000 UNITS: 100000 SUSPENSION ORAL at 05:05

## 2021-05-21 RX ADMIN — VENLAFAXINE 75 MG: 75 TABLET ORAL at 08:05

## 2021-05-21 RX ADMIN — CETIRIZINE HYDROCHLORIDE 10 MG: 10 TABLET, FILM COATED ORAL at 08:05

## 2021-05-21 RX ADMIN — GUAIFENESIN AND DEXTROMETHORPHAN 10 ML: 100; 10 SYRUP ORAL at 08:05

## 2021-05-21 RX ADMIN — LEVOTHYROXINE SODIUM 88 MCG: 88 TABLET ORAL at 07:05

## 2021-05-22 LAB
ALBUMIN SERPL BCP-MCNC: 2.8 G/DL (ref 3.5–5.2)
ALP SERPL-CCNC: 71 U/L (ref 55–135)
ALT SERPL W/O P-5'-P-CCNC: 24 U/L (ref 10–44)
ANION GAP SERPL CALC-SCNC: 6 MMOL/L (ref 8–16)
AST SERPL-CCNC: 29 U/L (ref 10–40)
BASOPHILS # BLD AUTO: 0.03 K/UL (ref 0–0.2)
BASOPHILS NFR BLD: 0.4 % (ref 0–1.9)
BILIRUB SERPL-MCNC: 0.4 MG/DL (ref 0.1–1)
BUN SERPL-MCNC: 10 MG/DL (ref 8–23)
CALCIUM SERPL-MCNC: 9.3 MG/DL (ref 8.7–10.5)
CHLORIDE SERPL-SCNC: 103 MMOL/L (ref 95–110)
CO2 SERPL-SCNC: 33 MMOL/L (ref 23–29)
CREAT SERPL-MCNC: 0.7 MG/DL (ref 0.5–1.4)
DIFFERENTIAL METHOD: ABNORMAL
EOSINOPHIL # BLD AUTO: 0.3 K/UL (ref 0–0.5)
EOSINOPHIL NFR BLD: 3.2 % (ref 0–8)
ERYTHROCYTE [DISTWIDTH] IN BLOOD BY AUTOMATED COUNT: 12.5 % (ref 11.5–14.5)
EST. GFR  (AFRICAN AMERICAN): >60 ML/MIN/1.73 M^2
EST. GFR  (NON AFRICAN AMERICAN): >60 ML/MIN/1.73 M^2
GLUCOSE SERPL-MCNC: 105 MG/DL (ref 70–110)
HCT VFR BLD AUTO: 36.5 % (ref 37–48.5)
HGB BLD-MCNC: 12 G/DL (ref 12–16)
IMM GRANULOCYTES # BLD AUTO: 0.04 K/UL (ref 0–0.04)
IMM GRANULOCYTES NFR BLD AUTO: 0.5 % (ref 0–0.5)
LYMPHOCYTES # BLD AUTO: 0.5 K/UL (ref 1–4.8)
LYMPHOCYTES NFR BLD: 6.4 % (ref 18–48)
MAGNESIUM SERPL-MCNC: 1.5 MG/DL (ref 1.6–2.6)
MCH RBC QN AUTO: 31.3 PG (ref 27–31)
MCHC RBC AUTO-ENTMCNC: 32.9 G/DL (ref 32–36)
MCV RBC AUTO: 95 FL (ref 82–98)
MONOCYTES # BLD AUTO: 0.6 K/UL (ref 0.3–1)
MONOCYTES NFR BLD: 6.7 % (ref 4–15)
NEUTROPHILS # BLD AUTO: 7 K/UL (ref 1.8–7.7)
NEUTROPHILS NFR BLD: 82.8 % (ref 38–73)
NRBC BLD-RTO: 0 /100 WBC
PLATELET # BLD AUTO: 329 K/UL (ref 150–450)
PMV BLD AUTO: 10.5 FL (ref 9.2–12.9)
POTASSIUM SERPL-SCNC: 3.6 MMOL/L (ref 3.5–5.1)
PROT SERPL-MCNC: 6.5 G/DL (ref 6–8.4)
RBC # BLD AUTO: 3.84 M/UL (ref 4–5.4)
SODIUM SERPL-SCNC: 142 MMOL/L (ref 136–145)
WBC # BLD AUTO: 8.46 K/UL (ref 3.9–12.7)

## 2021-05-22 PROCEDURE — 11000001 HC ACUTE MED/SURG PRIVATE ROOM

## 2021-05-22 PROCEDURE — 36415 COLL VENOUS BLD VENIPUNCTURE: CPT | Performed by: NURSE PRACTITIONER

## 2021-05-22 PROCEDURE — 85025 COMPLETE CBC W/AUTO DIFF WBC: CPT | Performed by: NURSE PRACTITIONER

## 2021-05-22 PROCEDURE — 25000003 PHARM REV CODE 250: Performed by: NURSE PRACTITIONER

## 2021-05-22 PROCEDURE — 80053 COMPREHEN METABOLIC PANEL: CPT | Performed by: NURSE PRACTITIONER

## 2021-05-22 PROCEDURE — 83735 ASSAY OF MAGNESIUM: CPT | Performed by: NURSE PRACTITIONER

## 2021-05-22 PROCEDURE — 25000003 PHARM REV CODE 250: Performed by: INTERNAL MEDICINE

## 2021-05-22 PROCEDURE — 63600175 PHARM REV CODE 636 W HCPCS: Performed by: NURSE PRACTITIONER

## 2021-05-22 RX ADMIN — SUCRALFATE 1 G: 1 SUSPENSION ORAL at 11:05

## 2021-05-22 RX ADMIN — SIMVASTATIN 40 MG: 40 TABLET, FILM COATED ORAL at 09:05

## 2021-05-22 RX ADMIN — ONDANSETRON 8 MG: 4 TABLET, ORALLY DISINTEGRATING ORAL at 03:05

## 2021-05-22 RX ADMIN — CEFTRIAXONE SODIUM 1 G: 1 INJECTION, POWDER, FOR SOLUTION INTRAMUSCULAR; INTRAVENOUS at 02:05

## 2021-05-22 RX ADMIN — POLYETHYLENE GLYCOL (3350) 17 G: 17 POWDER, FOR SOLUTION ORAL at 09:05

## 2021-05-22 RX ADMIN — LEVOTHYROXINE SODIUM 88 MCG: 88 TABLET ORAL at 05:05

## 2021-05-22 RX ADMIN — SUCRALFATE 1 G: 1 SUSPENSION ORAL at 09:05

## 2021-05-22 RX ADMIN — ACETAMINOPHEN 650 MG: 325 TABLET ORAL at 05:05

## 2021-05-22 RX ADMIN — SUCRALFATE 1 G: 1 SUSPENSION ORAL at 02:05

## 2021-05-22 RX ADMIN — CETIRIZINE HYDROCHLORIDE 10 MG: 10 TABLET, FILM COATED ORAL at 09:05

## 2021-05-22 RX ADMIN — POTASSIUM BICARBONATE 20 MEQ: 391 TABLET, EFFERVESCENT ORAL at 09:05

## 2021-05-22 RX ADMIN — NYSTATIN 500000 UNITS: 100000 SUSPENSION ORAL at 09:05

## 2021-05-22 RX ADMIN — HYDROCODONE BITARTRATE AND ACETAMINOPHEN 1 TABLET: 5; 325 TABLET ORAL at 09:05

## 2021-05-22 RX ADMIN — VERAPAMIL HYDROCHLORIDE 240 MG: 120 TABLET, FILM COATED, EXTENDED RELEASE ORAL at 09:05

## 2021-05-22 RX ADMIN — LOSARTAN POTASSIUM 100 MG: 50 TABLET, FILM COATED ORAL at 09:05

## 2021-05-22 RX ADMIN — NYSTATIN 500000 UNITS: 100000 SUSPENSION ORAL at 02:05

## 2021-05-22 RX ADMIN — VENLAFAXINE 75 MG: 75 TABLET ORAL at 09:05

## 2021-05-22 RX ADMIN — Medication 6 MG: at 09:05

## 2021-05-23 LAB
ALBUMIN SERPL BCP-MCNC: 2.6 G/DL (ref 3.5–5.2)
ALP SERPL-CCNC: 62 U/L (ref 55–135)
ALT SERPL W/O P-5'-P-CCNC: 30 U/L (ref 10–44)
ANION GAP SERPL CALC-SCNC: 6 MMOL/L (ref 8–16)
AST SERPL-CCNC: 41 U/L (ref 10–40)
BASOPHILS # BLD AUTO: 0.05 K/UL (ref 0–0.2)
BASOPHILS NFR BLD: 0.7 % (ref 0–1.9)
BILIRUB SERPL-MCNC: 0.3 MG/DL (ref 0.1–1)
BUN SERPL-MCNC: 10 MG/DL (ref 8–23)
CALCIUM SERPL-MCNC: 9 MG/DL (ref 8.7–10.5)
CHLORIDE SERPL-SCNC: 102 MMOL/L (ref 95–110)
CO2 SERPL-SCNC: 34 MMOL/L (ref 23–29)
CREAT SERPL-MCNC: 0.6 MG/DL (ref 0.5–1.4)
DIFFERENTIAL METHOD: ABNORMAL
EOSINOPHIL # BLD AUTO: 0.3 K/UL (ref 0–0.5)
EOSINOPHIL NFR BLD: 4.3 % (ref 0–8)
ERYTHROCYTE [DISTWIDTH] IN BLOOD BY AUTOMATED COUNT: 12.4 % (ref 11.5–14.5)
EST. GFR  (AFRICAN AMERICAN): >60 ML/MIN/1.73 M^2
EST. GFR  (NON AFRICAN AMERICAN): >60 ML/MIN/1.73 M^2
GLUCOSE SERPL-MCNC: 88 MG/DL (ref 70–110)
HCT VFR BLD AUTO: 34.6 % (ref 37–48.5)
HGB BLD-MCNC: 11.4 G/DL (ref 12–16)
IMM GRANULOCYTES # BLD AUTO: 0.07 K/UL (ref 0–0.04)
IMM GRANULOCYTES NFR BLD AUTO: 1 % (ref 0–0.5)
LYMPHOCYTES # BLD AUTO: 0.9 K/UL (ref 1–4.8)
LYMPHOCYTES NFR BLD: 13.1 % (ref 18–48)
MAGNESIUM SERPL-MCNC: 1.5 MG/DL (ref 1.6–2.6)
MCH RBC QN AUTO: 31 PG (ref 27–31)
MCHC RBC AUTO-ENTMCNC: 32.9 G/DL (ref 32–36)
MCV RBC AUTO: 94 FL (ref 82–98)
MONOCYTES # BLD AUTO: 0.7 K/UL (ref 0.3–1)
MONOCYTES NFR BLD: 10.6 % (ref 4–15)
NEUTROPHILS # BLD AUTO: 4.9 K/UL (ref 1.8–7.7)
NEUTROPHILS NFR BLD: 70.3 % (ref 38–73)
NRBC BLD-RTO: 0 /100 WBC
PLATELET # BLD AUTO: 316 K/UL (ref 150–450)
PMV BLD AUTO: 10.1 FL (ref 9.2–12.9)
POTASSIUM SERPL-SCNC: 3.2 MMOL/L (ref 3.5–5.1)
PROT SERPL-MCNC: 5.9 G/DL (ref 6–8.4)
RBC # BLD AUTO: 3.68 M/UL (ref 4–5.4)
SODIUM SERPL-SCNC: 142 MMOL/L (ref 136–145)
WBC # BLD AUTO: 6.97 K/UL (ref 3.9–12.7)

## 2021-05-23 PROCEDURE — 11000001 HC ACUTE MED/SURG PRIVATE ROOM

## 2021-05-23 PROCEDURE — 85025 COMPLETE CBC W/AUTO DIFF WBC: CPT | Performed by: NURSE PRACTITIONER

## 2021-05-23 PROCEDURE — 25000003 PHARM REV CODE 250: Performed by: NURSE PRACTITIONER

## 2021-05-23 PROCEDURE — 63600175 PHARM REV CODE 636 W HCPCS: Performed by: NURSE PRACTITIONER

## 2021-05-23 PROCEDURE — 36415 COLL VENOUS BLD VENIPUNCTURE: CPT | Performed by: NURSE PRACTITIONER

## 2021-05-23 PROCEDURE — 83735 ASSAY OF MAGNESIUM: CPT | Performed by: NURSE PRACTITIONER

## 2021-05-23 PROCEDURE — 25000003 PHARM REV CODE 250: Performed by: INTERNAL MEDICINE

## 2021-05-23 PROCEDURE — 80053 COMPREHEN METABOLIC PANEL: CPT | Performed by: NURSE PRACTITIONER

## 2021-05-23 RX ADMIN — CEFTRIAXONE SODIUM 1 G: 1 INJECTION, POWDER, FOR SOLUTION INTRAMUSCULAR; INTRAVENOUS at 02:05

## 2021-05-23 RX ADMIN — VENLAFAXINE 75 MG: 75 TABLET ORAL at 09:05

## 2021-05-23 RX ADMIN — POLYETHYLENE GLYCOL (3350) 17 G: 17 POWDER, FOR SOLUTION ORAL at 09:05

## 2021-05-23 RX ADMIN — POTASSIUM BICARBONATE 20 MEQ: 391 TABLET, EFFERVESCENT ORAL at 09:05

## 2021-05-23 RX ADMIN — VERAPAMIL HYDROCHLORIDE 240 MG: 120 TABLET, FILM COATED, EXTENDED RELEASE ORAL at 08:05

## 2021-05-23 RX ADMIN — LOSARTAN POTASSIUM 100 MG: 50 TABLET, FILM COATED ORAL at 09:05

## 2021-05-23 RX ADMIN — HYDROCODONE BITARTRATE AND ACETAMINOPHEN 1 TABLET: 5; 325 TABLET ORAL at 08:05

## 2021-05-23 RX ADMIN — CETIRIZINE HYDROCHLORIDE 10 MG: 10 TABLET, FILM COATED ORAL at 09:05

## 2021-05-23 RX ADMIN — LEVOTHYROXINE SODIUM 88 MCG: 88 TABLET ORAL at 06:05

## 2021-05-23 RX ADMIN — SUCRALFATE 1 G: 1 SUSPENSION ORAL at 11:05

## 2021-05-23 RX ADMIN — SUCRALFATE 1 G: 1 SUSPENSION ORAL at 05:05

## 2021-05-24 ENCOUNTER — HOSPITAL ENCOUNTER (INPATIENT)
Facility: HOSPITAL | Age: 86
LOS: 11 days | Discharge: HOME HEALTH | DRG: 948 | End: 2021-06-04
Attending: INTERNAL MEDICINE | Admitting: INTERNAL MEDICINE
Payer: MEDICARE

## 2021-05-24 VITALS
BODY MASS INDEX: 17.27 KG/M2 | HEIGHT: 67 IN | OXYGEN SATURATION: 92 % | HEART RATE: 74 BPM | DIASTOLIC BLOOD PRESSURE: 67 MMHG | TEMPERATURE: 98 F | RESPIRATION RATE: 18 BRPM | WEIGHT: 110 LBS | SYSTOLIC BLOOD PRESSURE: 155 MMHG

## 2021-05-24 DIAGNOSIS — E86.0 DEHYDRATION: ICD-10-CM

## 2021-05-24 DIAGNOSIS — R53.1 WEAKNESS: ICD-10-CM

## 2021-05-24 DIAGNOSIS — R53.81 DEBILITY: ICD-10-CM

## 2021-05-24 DIAGNOSIS — R79.89 ELEVATED TROPONIN: ICD-10-CM
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
## 2021-05-24 DIAGNOSIS — R05.9 COUGH: ICD-10-CM

## 2021-05-24 DIAGNOSIS — I10 HYPERTENSION, UNSPECIFIED TYPE: ICD-10-CM

## 2021-05-24 DIAGNOSIS — M19.90 ARTHRITIS: ICD-10-CM

## 2021-05-24 DIAGNOSIS — E87.6 HYPOKALEMIA: Primary | ICD-10-CM

## 2021-05-24 DIAGNOSIS — N12 PYELONEPHRITIS: ICD-10-CM

## 2021-05-24 LAB
ALBUMIN SERPL BCP-MCNC: 2.7 G/DL (ref 3.5–5.2)
ALP SERPL-CCNC: 64 U/L (ref 55–135)
ALT SERPL W/O P-5'-P-CCNC: 52 U/L (ref 10–44)
ANION GAP SERPL CALC-SCNC: 5 MMOL/L (ref 8–16)
AST SERPL-CCNC: 71 U/L (ref 10–40)
BASOPHILS # BLD AUTO: 0.04 K/UL (ref 0–0.2)
BASOPHILS NFR BLD: 0.6 % (ref 0–1.9)
BILIRUB SERPL-MCNC: 0.3 MG/DL (ref 0.1–1)
BUN SERPL-MCNC: 12 MG/DL (ref 8–23)
CALCIUM SERPL-MCNC: 9.1 MG/DL (ref 8.7–10.5)
CHLORIDE SERPL-SCNC: 100 MMOL/L (ref 95–110)
CO2 SERPL-SCNC: 35 MMOL/L (ref 23–29)
CREAT SERPL-MCNC: 0.6 MG/DL (ref 0.5–1.4)
DIFFERENTIAL METHOD: ABNORMAL
EOSINOPHIL # BLD AUTO: 0.2 K/UL (ref 0–0.5)
EOSINOPHIL NFR BLD: 3.2 % (ref 0–8)
ERYTHROCYTE [DISTWIDTH] IN BLOOD BY AUTOMATED COUNT: 12.5 % (ref 11.5–14.5)
EST. GFR  (AFRICAN AMERICAN): >60 ML/MIN/1.73 M^2
EST. GFR  (NON AFRICAN AMERICAN): >60 ML/MIN/1.73 M^2
GLUCOSE SERPL-MCNC: 86 MG/DL (ref 70–110)
HCT VFR BLD AUTO: 34.1 % (ref 37–48.5)
HGB BLD-MCNC: 11.2 G/DL (ref 12–16)
IMM GRANULOCYTES # BLD AUTO: 0.07 K/UL (ref 0–0.04)
IMM GRANULOCYTES NFR BLD AUTO: 1 % (ref 0–0.5)
LYMPHOCYTES # BLD AUTO: 1.1 K/UL (ref 1–4.8)
LYMPHOCYTES NFR BLD: 15 % (ref 18–48)
MAGNESIUM SERPL-MCNC: 1.7 MG/DL (ref 1.6–2.6)
MCH RBC QN AUTO: 30.9 PG (ref 27–31)
MCHC RBC AUTO-ENTMCNC: 32.8 G/DL (ref 32–36)
MCV RBC AUTO: 94 FL (ref 82–98)
MONOCYTES # BLD AUTO: 0.8 K/UL (ref 0.3–1)
MONOCYTES NFR BLD: 11.5 % (ref 4–15)
NEUTROPHILS # BLD AUTO: 4.9 K/UL (ref 1.8–7.7)
NEUTROPHILS NFR BLD: 68.7 % (ref 38–73)
NRBC BLD-RTO: 0 /100 WBC
PLATELET # BLD AUTO: 350 K/UL (ref 150–450)
PMV BLD AUTO: 9.7 FL (ref 9.2–12.9)
POTASSIUM SERPL-SCNC: 3.1 MMOL/L (ref 3.5–5.1)
PROT SERPL-MCNC: 6.1 G/DL (ref 6–8.4)
RBC # BLD AUTO: 3.62 M/UL (ref 4–5.4)
SODIUM SERPL-SCNC: 140 MMOL/L (ref 136–145)
WBC # BLD AUTO: 7.19 K/UL (ref 3.9–12.7)

## 2021-05-24 PROCEDURE — 83735 ASSAY OF MAGNESIUM: CPT | Performed by: NURSE PRACTITIONER

## 2021-05-24 PROCEDURE — 97162 PT EVAL MOD COMPLEX 30 MIN: CPT

## 2021-05-24 PROCEDURE — 97166 OT EVAL MOD COMPLEX 45 MIN: CPT

## 2021-05-24 PROCEDURE — 85025 COMPLETE CBC W/AUTO DIFF WBC: CPT | Performed by: NURSE PRACTITIONER

## 2021-05-24 PROCEDURE — 63600175 PHARM REV CODE 636 W HCPCS: Performed by: NURSE PRACTITIONER

## 2021-05-24 PROCEDURE — 25000003 PHARM REV CODE 250: Performed by: NURSE PRACTITIONER

## 2021-05-24 PROCEDURE — 80053 COMPREHEN METABOLIC PANEL: CPT | Performed by: NURSE PRACTITIONER

## 2021-05-24 PROCEDURE — 92523 SPEECH SOUND LANG COMPREHEN: CPT

## 2021-05-24 PROCEDURE — 25000003 PHARM REV CODE 250: Performed by: INTERNAL MEDICINE

## 2021-05-24 PROCEDURE — 11000001 HC ACUTE MED/SURG PRIVATE ROOM

## 2021-05-24 PROCEDURE — 36415 COLL VENOUS BLD VENIPUNCTURE: CPT | Performed by: NURSE PRACTITIONER

## 2021-05-24 RX ORDER — SUCRALFATE 1 G/10ML
1 SUSPENSION ORAL
Status: DISCONTINUED | OUTPATIENT
Start: 2021-05-24 | End: 2021-06-01

## 2021-05-24 RX ORDER — CETIRIZINE HYDROCHLORIDE 10 MG/1
10 TABLET ORAL DAILY
Status: DISCONTINUED | OUTPATIENT
Start: 2021-05-25 | End: 2021-06-04 | Stop reason: HOSPADM

## 2021-05-24 RX ORDER — ACETAMINOPHEN 325 MG/1
650 TABLET ORAL EVERY 8 HOURS PRN
Status: CANCELLED | OUTPATIENT
Start: 2021-05-24

## 2021-05-24 RX ORDER — HYDROCODONE BITARTRATE AND ACETAMINOPHEN 5; 325 MG/1; MG/1
1 TABLET ORAL EVERY 4 HOURS PRN
Status: DISCONTINUED | OUTPATIENT
Start: 2021-05-24 | End: 2021-06-04 | Stop reason: HOSPADM

## 2021-05-24 RX ORDER — LEVOTHYROXINE SODIUM 88 UG/1
88 TABLET ORAL
Status: CANCELLED | OUTPATIENT
Start: 2021-05-25

## 2021-05-24 RX ORDER — LEVOFLOXACIN 500 MG/1
500 TABLET, FILM COATED ORAL DAILY
Status: CANCELLED | OUTPATIENT
Start: 2021-05-24

## 2021-05-24 RX ORDER — SODIUM CHLORIDE 0.9 % (FLUSH) 0.9 %
10 SYRINGE (ML) INJECTION
Status: CANCELLED | OUTPATIENT
Start: 2021-05-24

## 2021-05-24 RX ORDER — BENZONATATE 100 MG/1
100 CAPSULE ORAL 3 TIMES DAILY PRN
Status: DISCONTINUED | OUTPATIENT
Start: 2021-05-24 | End: 2021-06-04 | Stop reason: HOSPADM

## 2021-05-24 RX ORDER — ACETAMINOPHEN 325 MG/1
650 TABLET ORAL EVERY 8 HOURS PRN
Status: DISCONTINUED | OUTPATIENT
Start: 2021-05-24 | End: 2021-06-04 | Stop reason: HOSPADM

## 2021-05-24 RX ORDER — SIMVASTATIN 40 MG/1
40 TABLET, FILM COATED ORAL NIGHTLY
Status: DISCONTINUED | OUTPATIENT
Start: 2021-05-24 | End: 2021-06-04 | Stop reason: HOSPADM

## 2021-05-24 RX ORDER — VENLAFAXINE 75 MG/1
75 TABLET ORAL DAILY
Status: CANCELLED | OUTPATIENT
Start: 2021-05-25

## 2021-05-24 RX ORDER — POLYETHYLENE GLYCOL 3350 17 G/17G
17 POWDER, FOR SOLUTION ORAL DAILY
Status: CANCELLED | OUTPATIENT
Start: 2021-05-25

## 2021-05-24 RX ORDER — LEVOFLOXACIN 250 MG/1
500 TABLET ORAL DAILY
Status: COMPLETED | OUTPATIENT
Start: 2021-05-24 | End: 2021-05-30

## 2021-05-24 RX ORDER — VERAPAMIL HYDROCHLORIDE 120 MG/1
240 TABLET, FILM COATED, EXTENDED RELEASE ORAL NIGHTLY
Status: CANCELLED | OUTPATIENT
Start: 2021-05-24

## 2021-05-24 RX ORDER — SODIUM CHLORIDE 0.9 % (FLUSH) 0.9 %
10 SYRINGE (ML) INJECTION
Status: DISCONTINUED | OUTPATIENT
Start: 2021-05-24 | End: 2021-06-04 | Stop reason: HOSPADM

## 2021-05-24 RX ORDER — GUAIFENESIN/DEXTROMETHORPHAN 100-10MG/5
10 SYRUP ORAL EVERY 4 HOURS PRN
Status: DISCONTINUED | OUTPATIENT
Start: 2021-05-24 | End: 2021-06-04 | Stop reason: HOSPADM

## 2021-05-24 RX ORDER — GUAIFENESIN/DEXTROMETHORPHAN 100-10MG/5
10 SYRUP ORAL EVERY 4 HOURS PRN
Status: CANCELLED | OUTPATIENT
Start: 2021-05-24

## 2021-05-24 RX ORDER — LEVOTHYROXINE SODIUM 88 UG/1
88 TABLET ORAL
Status: DISCONTINUED | OUTPATIENT
Start: 2021-05-25 | End: 2021-06-04 | Stop reason: HOSPADM

## 2021-05-24 RX ORDER — SUCRALFATE 1 G/10ML
1 SUSPENSION ORAL
Status: CANCELLED | OUTPATIENT
Start: 2021-05-24

## 2021-05-24 RX ORDER — HYDROCODONE BITARTRATE AND ACETAMINOPHEN 5; 325 MG/1; MG/1
1 TABLET ORAL EVERY 4 HOURS PRN
Status: CANCELLED | OUTPATIENT
Start: 2021-05-24

## 2021-05-24 RX ORDER — TALC
6 POWDER (GRAM) TOPICAL NIGHTLY PRN
Status: DISCONTINUED | OUTPATIENT
Start: 2021-05-24 | End: 2021-06-04 | Stop reason: HOSPADM

## 2021-05-24 RX ORDER — VENLAFAXINE 75 MG/1
75 TABLET ORAL DAILY
Status: DISCONTINUED | OUTPATIENT
Start: 2021-05-25 | End: 2021-06-04 | Stop reason: HOSPADM

## 2021-05-24 RX ORDER — VERAPAMIL HYDROCHLORIDE 120 MG/1
240 TABLET, FILM COATED, EXTENDED RELEASE ORAL NIGHTLY
Status: DISCONTINUED | OUTPATIENT
Start: 2021-05-24 | End: 2021-06-04 | Stop reason: HOSPADM

## 2021-05-24 RX ORDER — POLYETHYLENE GLYCOL 3350 17 G/17G
17 POWDER, FOR SOLUTION ORAL DAILY
Status: DISCONTINUED | OUTPATIENT
Start: 2021-05-25 | End: 2021-05-25

## 2021-05-24 RX ORDER — LOSARTAN POTASSIUM 50 MG/1
100 TABLET ORAL DAILY
Status: CANCELLED | OUTPATIENT
Start: 2021-05-25

## 2021-05-24 RX ORDER — ONDANSETRON 4 MG/1
8 TABLET, ORALLY DISINTEGRATING ORAL EVERY 8 HOURS PRN
Status: DISCONTINUED | OUTPATIENT
Start: 2021-05-24 | End: 2021-06-04 | Stop reason: HOSPADM

## 2021-05-24 RX ORDER — CETIRIZINE HYDROCHLORIDE 10 MG/1
10 TABLET ORAL DAILY
Status: CANCELLED | OUTPATIENT
Start: 2021-05-25

## 2021-05-24 RX ORDER — BENZONATATE 100 MG/1
100 CAPSULE ORAL 3 TIMES DAILY PRN
Status: CANCELLED | OUTPATIENT
Start: 2021-05-24

## 2021-05-24 RX ORDER — SIMVASTATIN 40 MG/1
40 TABLET, FILM COATED ORAL NIGHTLY
Status: CANCELLED | OUTPATIENT
Start: 2021-05-24

## 2021-05-24 RX ORDER — LOSARTAN POTASSIUM 50 MG/1
100 TABLET ORAL DAILY
Status: DISCONTINUED | OUTPATIENT
Start: 2021-05-25 | End: 2021-06-04 | Stop reason: HOSPADM

## 2021-05-24 RX ORDER — TALC
6 POWDER (GRAM) TOPICAL NIGHTLY PRN
Status: CANCELLED | OUTPATIENT
Start: 2021-05-24

## 2021-05-24 RX ORDER — ONDANSETRON 4 MG/1
8 TABLET, ORALLY DISINTEGRATING ORAL EVERY 8 HOURS PRN
Status: CANCELLED | OUTPATIENT
Start: 2021-05-24

## 2021-05-24 RX ADMIN — ACETAMINOPHEN 650 MG: 325 TABLET ORAL at 09:05

## 2021-05-24 RX ADMIN — LOSARTAN POTASSIUM 100 MG: 50 TABLET, FILM COATED ORAL at 08:05

## 2021-05-24 RX ADMIN — MELATONIN 6 MG: at 09:05

## 2021-05-24 RX ADMIN — LEVOFLOXACIN 500 MG: 250 TABLET, FILM COATED ORAL at 04:05

## 2021-05-24 RX ADMIN — VENLAFAXINE 75 MG: 75 TABLET ORAL at 08:05

## 2021-05-24 RX ADMIN — SUCRALFATE 1 G: 1 SUSPENSION ORAL at 04:05

## 2021-05-24 RX ADMIN — CEFTRIAXONE SODIUM 1 G: 1 INJECTION, POWDER, FOR SOLUTION INTRAMUSCULAR; INTRAVENOUS at 02:05

## 2021-05-24 RX ADMIN — POTASSIUM BICARBONATE 20 MEQ: 391 TABLET, EFFERVESCENT ORAL at 08:05

## 2021-05-24 RX ADMIN — VERAPAMIL HYDROCHLORIDE 240 MG: 120 TABLET, FILM COATED, EXTENDED RELEASE ORAL at 09:05

## 2021-05-24 RX ADMIN — ONDANSETRON 8 MG: 4 TABLET, ORALLY DISINTEGRATING ORAL at 09:05

## 2021-05-24 RX ADMIN — POLYETHYLENE GLYCOL (3350) 17 G: 17 POWDER, FOR SOLUTION ORAL at 08:05

## 2021-05-24 RX ADMIN — POTASSIUM BICARBONATE 20 MEQ: 391 TABLET, EFFERVESCENT ORAL at 09:05

## 2021-05-24 RX ADMIN — SIMVASTATIN 40 MG: 40 TABLET, FILM COATED ORAL at 09:05

## 2021-05-24 RX ADMIN — CEFTRIAXONE SODIUM 1 G: 1 INJECTION, POWDER, FOR SOLUTION INTRAMUSCULAR; INTRAVENOUS at 01:05

## 2021-05-24 RX ADMIN — SUCRALFATE 1 G: 1 SUSPENSION ORAL at 09:05

## 2021-05-24 RX ADMIN — LEVOTHYROXINE SODIUM 88 MCG: 88 TABLET ORAL at 05:05

## 2021-05-24 RX ADMIN — CETIRIZINE HYDROCHLORIDE 10 MG: 10 TABLET, FILM COATED ORAL at 08:05

## 2021-05-25 PROCEDURE — 11000001 HC ACUTE MED/SURG PRIVATE ROOM

## 2021-05-25 PROCEDURE — 97530 THERAPEUTIC ACTIVITIES: CPT

## 2021-05-25 PROCEDURE — 25000003 PHARM REV CODE 250: Performed by: INTERNAL MEDICINE

## 2021-05-25 PROCEDURE — 97116 GAIT TRAINING THERAPY: CPT

## 2021-05-25 PROCEDURE — 97110 THERAPEUTIC EXERCISES: CPT

## 2021-05-25 PROCEDURE — 25000003 PHARM REV CODE 250: Performed by: NURSE PRACTITIONER

## 2021-05-25 RX ORDER — POTASSIUM CHLORIDE 750 MG/1
10 TABLET, EXTENDED RELEASE ORAL 2 TIMES DAILY
Status: DISCONTINUED | OUTPATIENT
Start: 2021-05-25 | End: 2021-06-04 | Stop reason: HOSPADM

## 2021-05-25 RX ORDER — POLYETHYLENE GLYCOL 3350 17 G/17G
17 POWDER, FOR SOLUTION ORAL NIGHTLY
Status: DISCONTINUED | OUTPATIENT
Start: 2021-05-25 | End: 2021-06-04 | Stop reason: HOSPADM

## 2021-05-25 RX ORDER — MIRTAZAPINE 7.5 MG/1
7.5 TABLET, FILM COATED ORAL NIGHTLY
Status: DISCONTINUED | OUTPATIENT
Start: 2021-05-25 | End: 2021-06-04 | Stop reason: HOSPADM

## 2021-05-25 RX ADMIN — LOSARTAN POTASSIUM 100 MG: 50 TABLET, FILM COATED ORAL at 09:05

## 2021-05-25 RX ADMIN — LEVOTHYROXINE SODIUM 88 MCG: 88 TABLET ORAL at 05:05

## 2021-05-25 RX ADMIN — LEVOFLOXACIN 500 MG: 250 TABLET, FILM COATED ORAL at 09:05

## 2021-05-25 RX ADMIN — SUCRALFATE 1 G: 1 SUSPENSION ORAL at 09:05

## 2021-05-25 RX ADMIN — POLYETHYLENE GLYCOL (3350) 17 G: 17 POWDER, FOR SOLUTION ORAL at 09:05

## 2021-05-25 RX ADMIN — SUCRALFATE 1 G: 1 SUSPENSION ORAL at 11:05

## 2021-05-25 RX ADMIN — VERAPAMIL HYDROCHLORIDE 240 MG: 120 TABLET, FILM COATED, EXTENDED RELEASE ORAL at 09:05

## 2021-05-25 RX ADMIN — SIMVASTATIN 40 MG: 40 TABLET, FILM COATED ORAL at 09:05

## 2021-05-25 RX ADMIN — SUCRALFATE 1 G: 1 SUSPENSION ORAL at 03:05

## 2021-05-25 RX ADMIN — MIRTAZAPINE 7.5 MG: 7.5 TABLET ORAL at 09:05

## 2021-05-25 RX ADMIN — POTASSIUM CHLORIDE 10 MEQ: 750 TABLET, FILM COATED, EXTENDED RELEASE ORAL at 09:05

## 2021-05-25 RX ADMIN — SUCRALFATE 1 G: 1 SUSPENSION ORAL at 06:05

## 2021-05-25 RX ADMIN — CETIRIZINE HYDROCHLORIDE 10 MG: 10 TABLET, FILM COATED ORAL at 09:05

## 2021-05-25 RX ADMIN — VENLAFAXINE 75 MG: 75 TABLET ORAL at 09:05

## 2021-05-26 PROBLEM — Z79.899 DVT PROPHYLAXIS: Status: ACTIVE | Noted: 2021-05-26

## 2021-05-26 PROBLEM — K13.79 MOUTH PAIN: Status: ACTIVE | Noted: 2021-05-26

## 2021-05-26 LAB — POCT GLUCOSE: 116 MG/DL (ref 70–110)

## 2021-05-26 PROCEDURE — 97535 SELF CARE MNGMENT TRAINING: CPT

## 2021-05-26 PROCEDURE — 97110 THERAPEUTIC EXERCISES: CPT

## 2021-05-26 PROCEDURE — 11000001 HC ACUTE MED/SURG PRIVATE ROOM

## 2021-05-26 PROCEDURE — 25000003 PHARM REV CODE 250: Performed by: NURSE PRACTITIONER

## 2021-05-26 PROCEDURE — 97116 GAIT TRAINING THERAPY: CPT

## 2021-05-26 PROCEDURE — 97530 THERAPEUTIC ACTIVITIES: CPT

## 2021-05-26 PROCEDURE — 25000003 PHARM REV CODE 250: Performed by: INTERNAL MEDICINE

## 2021-05-26 RX ORDER — MAG HYDROX/ALUMINUM HYD/SIMETH 200-200-20
30 SUSPENSION, ORAL (FINAL DOSE FORM) ORAL EVERY 6 HOURS PRN
Status: DISCONTINUED | OUTPATIENT
Start: 2021-05-26 | End: 2021-06-04 | Stop reason: HOSPADM

## 2021-05-26 RX ORDER — PANTOPRAZOLE SODIUM 40 MG/1
40 TABLET, DELAYED RELEASE ORAL DAILY
Status: DISCONTINUED | OUTPATIENT
Start: 2021-05-26 | End: 2021-06-04 | Stop reason: HOSPADM

## 2021-05-26 RX ADMIN — SUCRALFATE 1 G: 1 SUSPENSION ORAL at 09:05

## 2021-05-26 RX ADMIN — POTASSIUM CHLORIDE 10 MEQ: 750 TABLET, FILM COATED, EXTENDED RELEASE ORAL at 09:05

## 2021-05-26 RX ADMIN — SUCRALFATE 1 G: 1 SUSPENSION ORAL at 11:05

## 2021-05-26 RX ADMIN — MIRTAZAPINE 7.5 MG: 7.5 TABLET ORAL at 09:05

## 2021-05-26 RX ADMIN — RIVAROXABAN 10 MG: 10 TABLET, FILM COATED ORAL at 04:05

## 2021-05-26 RX ADMIN — ALUMINUM HYDROXIDE, MAGNESIUM HYDROXIDE, AND SIMETHICONE 30 ML: 200; 200; 20 SUSPENSION ORAL at 12:05

## 2021-05-26 RX ADMIN — POLYETHYLENE GLYCOL (3350) 17 G: 17 POWDER, FOR SOLUTION ORAL at 09:05

## 2021-05-26 RX ADMIN — PANTOPRAZOLE SODIUM 40 MG: 40 TABLET, DELAYED RELEASE ORAL at 12:05

## 2021-05-26 RX ADMIN — SUCRALFATE 1 G: 1 SUSPENSION ORAL at 06:05

## 2021-05-26 RX ADMIN — VERAPAMIL HYDROCHLORIDE 240 MG: 120 TABLET, FILM COATED, EXTENDED RELEASE ORAL at 09:05

## 2021-05-26 RX ADMIN — SIMVASTATIN 40 MG: 40 TABLET, FILM COATED ORAL at 09:05

## 2021-05-26 RX ADMIN — CETIRIZINE HYDROCHLORIDE 10 MG: 10 TABLET, FILM COATED ORAL at 08:05

## 2021-05-26 RX ADMIN — LEVOFLOXACIN 500 MG: 250 TABLET, FILM COATED ORAL at 08:05

## 2021-05-26 RX ADMIN — LOSARTAN POTASSIUM 100 MG: 50 TABLET, FILM COATED ORAL at 08:05

## 2021-05-26 RX ADMIN — POTASSIUM CHLORIDE 10 MEQ: 750 TABLET, FILM COATED, EXTENDED RELEASE ORAL at 08:05

## 2021-05-26 RX ADMIN — VENLAFAXINE 75 MG: 75 TABLET ORAL at 08:05

## 2021-05-26 RX ADMIN — LEVOTHYROXINE SODIUM 88 MCG: 88 TABLET ORAL at 05:05

## 2021-05-26 RX ADMIN — SUCRALFATE 1 G: 1 SUSPENSION ORAL at 04:05

## 2021-05-27 PROBLEM — K21.9 GERD (GASTROESOPHAGEAL REFLUX DISEASE): Status: ACTIVE | Noted: 2021-05-27

## 2021-05-27 LAB
ALBUMIN SERPL BCP-MCNC: 2.8 G/DL (ref 3.5–5.2)
ALP SERPL-CCNC: 55 U/L (ref 55–135)
ALT SERPL W/O P-5'-P-CCNC: 66 U/L (ref 10–44)
ANION GAP SERPL CALC-SCNC: 2 MMOL/L (ref 8–16)
AST SERPL-CCNC: 72 U/L (ref 10–40)
BASOPHILS # BLD AUTO: 0.06 K/UL (ref 0–0.2)
BASOPHILS NFR BLD: 0.8 % (ref 0–1.9)
BILIRUB SERPL-MCNC: 0.2 MG/DL (ref 0.1–1)
BUN SERPL-MCNC: 19 MG/DL (ref 8–23)
CALCIUM SERPL-MCNC: 9.6 MG/DL (ref 8.7–10.5)
CHLORIDE SERPL-SCNC: 107 MMOL/L (ref 95–110)
CO2 SERPL-SCNC: 36 MMOL/L (ref 23–29)
CREAT SERPL-MCNC: 0.9 MG/DL (ref 0.5–1.4)
DIFFERENTIAL METHOD: ABNORMAL
EOSINOPHIL # BLD AUTO: 0.2 K/UL (ref 0–0.5)
EOSINOPHIL NFR BLD: 2.2 % (ref 0–8)
ERYTHROCYTE [DISTWIDTH] IN BLOOD BY AUTOMATED COUNT: 12.8 % (ref 11.5–14.5)
EST. GFR  (AFRICAN AMERICAN): >60 ML/MIN/1.73 M^2
EST. GFR  (NON AFRICAN AMERICAN): 59.3 ML/MIN/1.73 M^2
GLUCOSE SERPL-MCNC: 89 MG/DL (ref 70–110)
HCT VFR BLD AUTO: 31.2 % (ref 37–48.5)
HGB BLD-MCNC: 10.9 G/DL (ref 12–16)
IMM GRANULOCYTES # BLD AUTO: 0.15 K/UL (ref 0–0.04)
IMM GRANULOCYTES NFR BLD AUTO: 1.9 % (ref 0–0.5)
LYMPHOCYTES # BLD AUTO: 1.7 K/UL (ref 1–4.8)
LYMPHOCYTES NFR BLD: 21.6 % (ref 18–48)
MAGNESIUM SERPL-MCNC: 2 MG/DL (ref 1.6–2.6)
MCH RBC QN AUTO: 33.5 PG (ref 27–31)
MCHC RBC AUTO-ENTMCNC: 34.9 G/DL (ref 32–36)
MCV RBC AUTO: 96 FL (ref 82–98)
MONOCYTES # BLD AUTO: 1.1 K/UL (ref 0.3–1)
MONOCYTES NFR BLD: 13.6 % (ref 4–15)
NEUTROPHILS # BLD AUTO: 4.7 K/UL (ref 1.8–7.7)
NEUTROPHILS NFR BLD: 59.9 % (ref 38–73)
NRBC BLD-RTO: 0 /100 WBC
PLATELET # BLD AUTO: 487 K/UL (ref 150–450)
PMV BLD AUTO: 10.3 FL (ref 9.2–12.9)
POTASSIUM SERPL-SCNC: 4.1 MMOL/L (ref 3.5–5.1)
PROT SERPL-MCNC: 5.8 G/DL (ref 6–8.4)
RBC # BLD AUTO: 3.25 M/UL (ref 4–5.4)
SODIUM SERPL-SCNC: 145 MMOL/L (ref 136–145)
WBC # BLD AUTO: 7.79 K/UL (ref 3.9–12.7)

## 2021-05-27 PROCEDURE — 83735 ASSAY OF MAGNESIUM: CPT | Performed by: NURSE PRACTITIONER

## 2021-05-27 PROCEDURE — 97116 GAIT TRAINING THERAPY: CPT

## 2021-05-27 PROCEDURE — 85025 COMPLETE CBC W/AUTO DIFF WBC: CPT | Performed by: NURSE PRACTITIONER

## 2021-05-27 PROCEDURE — 25000003 PHARM REV CODE 250: Performed by: NURSE PRACTITIONER

## 2021-05-27 PROCEDURE — 11000001 HC ACUTE MED/SURG PRIVATE ROOM

## 2021-05-27 PROCEDURE — 25000003 PHARM REV CODE 250: Performed by: INTERNAL MEDICINE

## 2021-05-27 PROCEDURE — 80053 COMPREHEN METABOLIC PANEL: CPT | Performed by: NURSE PRACTITIONER

## 2021-05-27 PROCEDURE — 97110 THERAPEUTIC EXERCISES: CPT

## 2021-05-27 PROCEDURE — 36415 COLL VENOUS BLD VENIPUNCTURE: CPT | Performed by: NURSE PRACTITIONER

## 2021-05-27 PROCEDURE — 97530 THERAPEUTIC ACTIVITIES: CPT

## 2021-05-27 RX ADMIN — SUCRALFATE 1 G: 1 SUSPENSION ORAL at 10:05

## 2021-05-27 RX ADMIN — PANTOPRAZOLE SODIUM 40 MG: 40 TABLET, DELAYED RELEASE ORAL at 08:05

## 2021-05-27 RX ADMIN — MIRTAZAPINE 7.5 MG: 7.5 TABLET ORAL at 09:05

## 2021-05-27 RX ADMIN — POLYETHYLENE GLYCOL (3350) 17 G: 17 POWDER, FOR SOLUTION ORAL at 09:05

## 2021-05-27 RX ADMIN — POTASSIUM CHLORIDE 10 MEQ: 750 TABLET, FILM COATED, EXTENDED RELEASE ORAL at 09:05

## 2021-05-27 RX ADMIN — SUCRALFATE 1 G: 1 SUSPENSION ORAL at 05:05

## 2021-05-27 RX ADMIN — POTASSIUM CHLORIDE 10 MEQ: 750 TABLET, FILM COATED, EXTENDED RELEASE ORAL at 08:05

## 2021-05-27 RX ADMIN — RIVAROXABAN 10 MG: 10 TABLET, FILM COATED ORAL at 04:05

## 2021-05-27 RX ADMIN — VENLAFAXINE 75 MG: 75 TABLET ORAL at 08:05

## 2021-05-27 RX ADMIN — LOSARTAN POTASSIUM 100 MG: 50 TABLET, FILM COATED ORAL at 08:05

## 2021-05-27 RX ADMIN — VERAPAMIL HYDROCHLORIDE 240 MG: 120 TABLET, FILM COATED, EXTENDED RELEASE ORAL at 09:05

## 2021-05-27 RX ADMIN — LEVOFLOXACIN 500 MG: 250 TABLET, FILM COATED ORAL at 08:05

## 2021-05-27 RX ADMIN — ACETAMINOPHEN 650 MG: 325 TABLET ORAL at 09:05

## 2021-05-27 RX ADMIN — SUCRALFATE 1 G: 1 SUSPENSION ORAL at 09:05

## 2021-05-27 RX ADMIN — LEVOTHYROXINE SODIUM 88 MCG: 88 TABLET ORAL at 05:05

## 2021-05-27 RX ADMIN — CETIRIZINE HYDROCHLORIDE 10 MG: 10 TABLET, FILM COATED ORAL at 08:05

## 2021-05-27 RX ADMIN — SIMVASTATIN 40 MG: 40 TABLET, FILM COATED ORAL at 09:05

## 2021-05-28 PROCEDURE — 97116 GAIT TRAINING THERAPY: CPT

## 2021-05-28 PROCEDURE — 25000003 PHARM REV CODE 250: Performed by: NURSE PRACTITIONER

## 2021-05-28 PROCEDURE — 97535 SELF CARE MNGMENT TRAINING: CPT

## 2021-05-28 PROCEDURE — 97530 THERAPEUTIC ACTIVITIES: CPT

## 2021-05-28 PROCEDURE — 97110 THERAPEUTIC EXERCISES: CPT

## 2021-05-28 PROCEDURE — 25000003 PHARM REV CODE 250: Performed by: INTERNAL MEDICINE

## 2021-05-28 PROCEDURE — 11000001 HC ACUTE MED/SURG PRIVATE ROOM

## 2021-05-28 RX ORDER — MAG HYDROX/ALUMINUM HYD/SIMETH 200-200-20
30 SUSPENSION, ORAL (FINAL DOSE FORM) ORAL 3 TIMES DAILY
Status: DISCONTINUED | OUTPATIENT
Start: 2021-05-28 | End: 2021-05-28

## 2021-05-28 RX ORDER — DIPHENHYDRAMINE HCL 12.5MG/5ML
12.5 ELIXIR ORAL 3 TIMES DAILY
Status: DISCONTINUED | OUTPATIENT
Start: 2021-05-28 | End: 2021-05-28

## 2021-05-28 RX ORDER — LIDOCAINE HYDROCHLORIDE 20 MG/ML
5 SOLUTION OROPHARYNGEAL 3 TIMES DAILY
Status: DISCONTINUED | OUTPATIENT
Start: 2021-05-28 | End: 2021-05-28

## 2021-05-28 RX ADMIN — POTASSIUM CHLORIDE 10 MEQ: 750 TABLET, FILM COATED, EXTENDED RELEASE ORAL at 09:05

## 2021-05-28 RX ADMIN — LOSARTAN POTASSIUM 100 MG: 50 TABLET, FILM COATED ORAL at 09:05

## 2021-05-28 RX ADMIN — LIDOCAINE HYDROCHLORIDE 5 ML: 20 SOLUTION ORAL; TOPICAL at 03:05

## 2021-05-28 RX ADMIN — SUCRALFATE 1 G: 1 SUSPENSION ORAL at 11:05

## 2021-05-28 RX ADMIN — SIMVASTATIN 40 MG: 40 TABLET, FILM COATED ORAL at 09:05

## 2021-05-28 RX ADMIN — MIRTAZAPINE 7.5 MG: 7.5 TABLET ORAL at 09:05

## 2021-05-28 RX ADMIN — LIDOCAINE HYDROCHLORIDE 5 ML: 20 SOLUTION ORAL; TOPICAL at 09:05

## 2021-05-28 RX ADMIN — SUCRALFATE 1 G: 1 SUSPENSION ORAL at 05:05

## 2021-05-28 RX ADMIN — VENLAFAXINE 75 MG: 75 TABLET ORAL at 09:05

## 2021-05-28 RX ADMIN — LEVOFLOXACIN 500 MG: 250 TABLET, FILM COATED ORAL at 09:05

## 2021-05-28 RX ADMIN — RIVAROXABAN 10 MG: 10 TABLET, FILM COATED ORAL at 05:05

## 2021-05-28 RX ADMIN — SUCRALFATE 1 G: 1 SUSPENSION ORAL at 09:05

## 2021-05-28 RX ADMIN — PANTOPRAZOLE SODIUM 40 MG: 40 TABLET, DELAYED RELEASE ORAL at 09:05

## 2021-05-28 RX ADMIN — POLYETHYLENE GLYCOL (3350) 17 G: 17 POWDER, FOR SOLUTION ORAL at 09:05

## 2021-05-28 RX ADMIN — VERAPAMIL HYDROCHLORIDE 240 MG: 120 TABLET, FILM COATED, EXTENDED RELEASE ORAL at 09:05

## 2021-05-28 RX ADMIN — LEVOTHYROXINE SODIUM 88 MCG: 88 TABLET ORAL at 05:05

## 2021-05-28 RX ADMIN — CETIRIZINE HYDROCHLORIDE 10 MG: 10 TABLET, FILM COATED ORAL at 09:05

## 2021-05-29 LAB
ALBUMIN SERPL BCP-MCNC: 3.3 G/DL (ref 3.5–5.2)
ALP SERPL-CCNC: 61 U/L (ref 55–135)
ALT SERPL W/O P-5'-P-CCNC: 55 U/L (ref 10–44)
ANION GAP SERPL CALC-SCNC: 5 MMOL/L (ref 8–16)
AST SERPL-CCNC: 47 U/L (ref 10–40)
BASOPHILS # BLD AUTO: 0.07 K/UL (ref 0–0.2)
BASOPHILS NFR BLD: 0.7 % (ref 0–1.9)
BILIRUB SERPL-MCNC: 0.3 MG/DL (ref 0.1–1)
BUN SERPL-MCNC: 23 MG/DL (ref 8–23)
CALCIUM SERPL-MCNC: 9.9 MG/DL (ref 8.7–10.5)
CHLORIDE SERPL-SCNC: 107 MMOL/L (ref 95–110)
CO2 SERPL-SCNC: 32 MMOL/L (ref 23–29)
CREAT SERPL-MCNC: 1.1 MG/DL (ref 0.5–1.4)
DIFFERENTIAL METHOD: ABNORMAL
EOSINOPHIL # BLD AUTO: 0.2 K/UL (ref 0–0.5)
EOSINOPHIL NFR BLD: 1.6 % (ref 0–8)
ERYTHROCYTE [DISTWIDTH] IN BLOOD BY AUTOMATED COUNT: 12.7 % (ref 11.5–14.5)
EST. GFR  (AFRICAN AMERICAN): 53.7 ML/MIN/1.73 M^2
EST. GFR  (NON AFRICAN AMERICAN): 46.5 ML/MIN/1.73 M^2
GLUCOSE SERPL-MCNC: 96 MG/DL (ref 70–110)
HCT VFR BLD AUTO: 34.8 % (ref 37–48.5)
HGB BLD-MCNC: 10.8 G/DL (ref 12–16)
IMM GRANULOCYTES # BLD AUTO: 0.18 K/UL (ref 0–0.04)
IMM GRANULOCYTES NFR BLD AUTO: 1.8 % (ref 0–0.5)
LYMPHOCYTES # BLD AUTO: 2.3 K/UL (ref 1–4.8)
LYMPHOCYTES NFR BLD: 23 % (ref 18–48)
MAGNESIUM SERPL-MCNC: 2.1 MG/DL (ref 1.6–2.6)
MCH RBC QN AUTO: 30.2 PG (ref 27–31)
MCHC RBC AUTO-ENTMCNC: 31 G/DL (ref 32–36)
MCV RBC AUTO: 97 FL (ref 82–98)
MONOCYTES # BLD AUTO: 1.1 K/UL (ref 0.3–1)
MONOCYTES NFR BLD: 10.8 % (ref 4–15)
NEUTROPHILS # BLD AUTO: 6.2 K/UL (ref 1.8–7.7)
NEUTROPHILS NFR BLD: 62.1 % (ref 38–73)
NRBC BLD-RTO: 0 /100 WBC
PLATELET # BLD AUTO: 516 K/UL (ref 150–450)
PMV BLD AUTO: 10 FL (ref 9.2–12.9)
POTASSIUM SERPL-SCNC: 4.6 MMOL/L (ref 3.5–5.1)
PROT SERPL-MCNC: 6.6 G/DL (ref 6–8.4)
RBC # BLD AUTO: 3.58 M/UL (ref 4–5.4)
SODIUM SERPL-SCNC: 144 MMOL/L (ref 136–145)
WBC # BLD AUTO: 9.99 K/UL (ref 3.9–12.7)

## 2021-05-29 PROCEDURE — 11000001 HC ACUTE MED/SURG PRIVATE ROOM

## 2021-05-29 PROCEDURE — 25000003 PHARM REV CODE 250: Performed by: NURSE PRACTITIONER

## 2021-05-29 PROCEDURE — 25000003 PHARM REV CODE 250: Performed by: INTERNAL MEDICINE

## 2021-05-29 PROCEDURE — 80053 COMPREHEN METABOLIC PANEL: CPT | Performed by: NURSE PRACTITIONER

## 2021-05-29 PROCEDURE — 83735 ASSAY OF MAGNESIUM: CPT | Performed by: NURSE PRACTITIONER

## 2021-05-29 PROCEDURE — 36415 COLL VENOUS BLD VENIPUNCTURE: CPT | Performed by: NURSE PRACTITIONER

## 2021-05-29 PROCEDURE — 85025 COMPLETE CBC W/AUTO DIFF WBC: CPT | Performed by: NURSE PRACTITIONER

## 2021-05-29 RX ADMIN — PANTOPRAZOLE SODIUM 40 MG: 40 TABLET, DELAYED RELEASE ORAL at 09:05

## 2021-05-29 RX ADMIN — SUCRALFATE 1 G: 1 SUSPENSION ORAL at 04:05

## 2021-05-29 RX ADMIN — MIRTAZAPINE 7.5 MG: 7.5 TABLET ORAL at 08:05

## 2021-05-29 RX ADMIN — POLYETHYLENE GLYCOL (3350) 17 G: 17 POWDER, FOR SOLUTION ORAL at 08:05

## 2021-05-29 RX ADMIN — SIMVASTATIN 40 MG: 40 TABLET, FILM COATED ORAL at 08:05

## 2021-05-29 RX ADMIN — LIDOCAINE HYDROCHLORIDE 5 ML: 20 SOLUTION ORAL; TOPICAL at 08:05

## 2021-05-29 RX ADMIN — VERAPAMIL HYDROCHLORIDE 240 MG: 120 TABLET, FILM COATED, EXTENDED RELEASE ORAL at 08:05

## 2021-05-29 RX ADMIN — LEVOFLOXACIN 500 MG: 250 TABLET, FILM COATED ORAL at 09:05

## 2021-05-29 RX ADMIN — POTASSIUM CHLORIDE 10 MEQ: 750 TABLET, FILM COATED, EXTENDED RELEASE ORAL at 09:05

## 2021-05-29 RX ADMIN — CETIRIZINE HYDROCHLORIDE 10 MG: 10 TABLET, FILM COATED ORAL at 09:05

## 2021-05-29 RX ADMIN — SUCRALFATE 1 G: 1 SUSPENSION ORAL at 06:05

## 2021-05-29 RX ADMIN — RIVAROXABAN 10 MG: 10 TABLET, FILM COATED ORAL at 05:05

## 2021-05-29 RX ADMIN — LIDOCAINE HYDROCHLORIDE 5 ML: 20 SOLUTION ORAL; TOPICAL at 09:05

## 2021-05-29 RX ADMIN — SUCRALFATE 1 G: 1 SUSPENSION ORAL at 08:05

## 2021-05-29 RX ADMIN — POTASSIUM CHLORIDE 10 MEQ: 750 TABLET, FILM COATED, EXTENDED RELEASE ORAL at 08:05

## 2021-05-29 RX ADMIN — LIDOCAINE HYDROCHLORIDE 5 ML: 20 SOLUTION ORAL; TOPICAL at 02:05

## 2021-05-29 RX ADMIN — LOSARTAN POTASSIUM 100 MG: 50 TABLET, FILM COATED ORAL at 09:05

## 2021-05-29 RX ADMIN — LEVOTHYROXINE SODIUM 88 MCG: 88 TABLET ORAL at 05:05

## 2021-05-29 RX ADMIN — VENLAFAXINE 75 MG: 75 TABLET ORAL at 09:05

## 2021-05-29 RX ADMIN — SUCRALFATE 1 G: 1 SUSPENSION ORAL at 11:05

## 2021-05-30 PROCEDURE — 25000003 PHARM REV CODE 250: Performed by: NURSE PRACTITIONER

## 2021-05-30 PROCEDURE — 25000003 PHARM REV CODE 250: Performed by: INTERNAL MEDICINE

## 2021-05-30 PROCEDURE — 11000001 HC ACUTE MED/SURG PRIVATE ROOM

## 2021-05-30 RX ADMIN — VERAPAMIL HYDROCHLORIDE 240 MG: 120 TABLET, FILM COATED, EXTENDED RELEASE ORAL at 08:05

## 2021-05-30 RX ADMIN — POTASSIUM CHLORIDE 10 MEQ: 750 TABLET, FILM COATED, EXTENDED RELEASE ORAL at 09:05

## 2021-05-30 RX ADMIN — POTASSIUM CHLORIDE 10 MEQ: 750 TABLET, FILM COATED, EXTENDED RELEASE ORAL at 08:05

## 2021-05-30 RX ADMIN — CETIRIZINE HYDROCHLORIDE 10 MG: 10 TABLET, FILM COATED ORAL at 09:05

## 2021-05-30 RX ADMIN — PANTOPRAZOLE SODIUM 40 MG: 40 TABLET, DELAYED RELEASE ORAL at 09:05

## 2021-05-30 RX ADMIN — MIRTAZAPINE 7.5 MG: 7.5 TABLET ORAL at 08:05

## 2021-05-30 RX ADMIN — LIDOCAINE HYDROCHLORIDE 5 ML: 20 SOLUTION ORAL; TOPICAL at 03:05

## 2021-05-30 RX ADMIN — SUCRALFATE 1 G: 1 SUSPENSION ORAL at 05:05

## 2021-05-30 RX ADMIN — VENLAFAXINE 75 MG: 75 TABLET ORAL at 09:05

## 2021-05-30 RX ADMIN — RIVAROXABAN 10 MG: 10 TABLET, FILM COATED ORAL at 04:05

## 2021-05-30 RX ADMIN — LIDOCAINE HYDROCHLORIDE 5 ML: 20 SOLUTION ORAL; TOPICAL at 09:05

## 2021-05-30 RX ADMIN — SUCRALFATE 1 G: 1 SUSPENSION ORAL at 07:05

## 2021-05-30 RX ADMIN — SUCRALFATE 1 G: 1 SUSPENSION ORAL at 11:05

## 2021-05-30 RX ADMIN — LEVOTHYROXINE SODIUM 88 MCG: 88 TABLET ORAL at 06:05

## 2021-05-30 RX ADMIN — LOSARTAN POTASSIUM 100 MG: 50 TABLET, FILM COATED ORAL at 09:05

## 2021-05-30 RX ADMIN — LEVOFLOXACIN 500 MG: 250 TABLET, FILM COATED ORAL at 09:05

## 2021-05-30 RX ADMIN — LIDOCAINE HYDROCHLORIDE 5 ML: 20 SOLUTION ORAL; TOPICAL at 08:05

## 2021-05-30 RX ADMIN — POLYETHYLENE GLYCOL (3350) 17 G: 17 POWDER, FOR SOLUTION ORAL at 08:05

## 2021-05-30 RX ADMIN — SIMVASTATIN 40 MG: 40 TABLET, FILM COATED ORAL at 08:05

## 2021-05-30 RX ADMIN — SUCRALFATE 1 G: 1 SUSPENSION ORAL at 08:05

## 2021-05-31 PROCEDURE — 97530 THERAPEUTIC ACTIVITIES: CPT

## 2021-05-31 PROCEDURE — 97535 SELF CARE MNGMENT TRAINING: CPT

## 2021-05-31 PROCEDURE — 25000003 PHARM REV CODE 250: Performed by: NURSE PRACTITIONER

## 2021-05-31 PROCEDURE — 97110 THERAPEUTIC EXERCISES: CPT

## 2021-05-31 PROCEDURE — 11000001 HC ACUTE MED/SURG PRIVATE ROOM

## 2021-05-31 PROCEDURE — 25000003 PHARM REV CODE 250: Performed by: INTERNAL MEDICINE

## 2021-05-31 PROCEDURE — 97116 GAIT TRAINING THERAPY: CPT

## 2021-05-31 RX ADMIN — SUCRALFATE 1 G: 1 SUSPENSION ORAL at 08:05

## 2021-05-31 RX ADMIN — LIDOCAINE HYDROCHLORIDE 5 ML: 20 SOLUTION ORAL; TOPICAL at 09:05

## 2021-05-31 RX ADMIN — SIMVASTATIN 40 MG: 40 TABLET, FILM COATED ORAL at 08:05

## 2021-05-31 RX ADMIN — LEVOTHYROXINE SODIUM 88 MCG: 88 TABLET ORAL at 05:05

## 2021-05-31 RX ADMIN — LIDOCAINE HYDROCHLORIDE 5 ML: 20 SOLUTION ORAL; TOPICAL at 03:05

## 2021-05-31 RX ADMIN — POTASSIUM CHLORIDE 10 MEQ: 750 TABLET, FILM COATED, EXTENDED RELEASE ORAL at 08:05

## 2021-05-31 RX ADMIN — MIRTAZAPINE 7.5 MG: 7.5 TABLET ORAL at 08:05

## 2021-05-31 RX ADMIN — PANTOPRAZOLE SODIUM 40 MG: 40 TABLET, DELAYED RELEASE ORAL at 09:05

## 2021-05-31 RX ADMIN — CETIRIZINE HYDROCHLORIDE 10 MG: 10 TABLET, FILM COATED ORAL at 09:05

## 2021-05-31 RX ADMIN — RIVAROXABAN 10 MG: 10 TABLET, FILM COATED ORAL at 04:05

## 2021-05-31 RX ADMIN — SUCRALFATE 1 G: 1 SUSPENSION ORAL at 04:05

## 2021-05-31 RX ADMIN — POTASSIUM CHLORIDE 10 MEQ: 750 TABLET, FILM COATED, EXTENDED RELEASE ORAL at 09:05

## 2021-05-31 RX ADMIN — VENLAFAXINE 75 MG: 75 TABLET ORAL at 09:05

## 2021-05-31 RX ADMIN — VERAPAMIL HYDROCHLORIDE 240 MG: 120 TABLET, FILM COATED, EXTENDED RELEASE ORAL at 08:05

## 2021-05-31 RX ADMIN — LOSARTAN POTASSIUM 100 MG: 50 TABLET, FILM COATED ORAL at 09:05

## 2021-05-31 RX ADMIN — SUCRALFATE 1 G: 1 SUSPENSION ORAL at 06:05

## 2021-05-31 RX ADMIN — SUCRALFATE 1 G: 1 SUSPENSION ORAL at 12:05

## 2021-05-31 RX ADMIN — LIDOCAINE HYDROCHLORIDE 5 ML: 20 SOLUTION ORAL; TOPICAL at 08:05

## 2021-06-01 PROCEDURE — 97530 THERAPEUTIC ACTIVITIES: CPT

## 2021-06-01 PROCEDURE — 25000003 PHARM REV CODE 250: Performed by: NURSE PRACTITIONER

## 2021-06-01 PROCEDURE — 11000001 HC ACUTE MED/SURG PRIVATE ROOM

## 2021-06-01 PROCEDURE — 97116 GAIT TRAINING THERAPY: CPT

## 2021-06-01 PROCEDURE — 97535 SELF CARE MNGMENT TRAINING: CPT

## 2021-06-01 PROCEDURE — 25000003 PHARM REV CODE 250: Performed by: INTERNAL MEDICINE

## 2021-06-01 PROCEDURE — 97110 THERAPEUTIC EXERCISES: CPT

## 2021-06-01 RX ADMIN — LOSARTAN POTASSIUM 100 MG: 50 TABLET, FILM COATED ORAL at 08:06

## 2021-06-01 RX ADMIN — VERAPAMIL HYDROCHLORIDE 240 MG: 120 TABLET, FILM COATED, EXTENDED RELEASE ORAL at 08:06

## 2021-06-01 RX ADMIN — POTASSIUM CHLORIDE 10 MEQ: 750 TABLET, FILM COATED, EXTENDED RELEASE ORAL at 08:06

## 2021-06-01 RX ADMIN — LIDOCAINE HYDROCHLORIDE 5 ML: 20 SOLUTION ORAL; TOPICAL at 08:06

## 2021-06-01 RX ADMIN — CETIRIZINE HYDROCHLORIDE 10 MG: 10 TABLET, FILM COATED ORAL at 08:06

## 2021-06-01 RX ADMIN — VENLAFAXINE 75 MG: 75 TABLET ORAL at 08:06

## 2021-06-01 RX ADMIN — RIVAROXABAN 10 MG: 10 TABLET, FILM COATED ORAL at 04:06

## 2021-06-01 RX ADMIN — SUCRALFATE 1 G: 1 SUSPENSION ORAL at 10:06

## 2021-06-01 RX ADMIN — SIMVASTATIN 40 MG: 40 TABLET, FILM COATED ORAL at 08:06

## 2021-06-01 RX ADMIN — MIRTAZAPINE 7.5 MG: 7.5 TABLET ORAL at 08:06

## 2021-06-01 RX ADMIN — PANTOPRAZOLE SODIUM 40 MG: 40 TABLET, DELAYED RELEASE ORAL at 08:06

## 2021-06-01 RX ADMIN — SUCRALFATE 1 G: 1 SUSPENSION ORAL at 05:06

## 2021-06-01 RX ADMIN — LIDOCAINE HYDROCHLORIDE 5 ML: 20 SOLUTION ORAL; TOPICAL at 03:06

## 2021-06-01 RX ADMIN — LEVOTHYROXINE SODIUM 88 MCG: 88 TABLET ORAL at 05:06

## 2021-06-02 PROCEDURE — 97530 THERAPEUTIC ACTIVITIES: CPT

## 2021-06-02 PROCEDURE — 25000003 PHARM REV CODE 250: Performed by: NURSE PRACTITIONER

## 2021-06-02 PROCEDURE — 97110 THERAPEUTIC EXERCISES: CPT

## 2021-06-02 PROCEDURE — 25000003 PHARM REV CODE 250: Performed by: INTERNAL MEDICINE

## 2021-06-02 PROCEDURE — 97535 SELF CARE MNGMENT TRAINING: CPT

## 2021-06-02 PROCEDURE — 11000001 HC ACUTE MED/SURG PRIVATE ROOM

## 2021-06-02 RX ADMIN — POLYETHYLENE GLYCOL (3350) 17 G: 17 POWDER, FOR SOLUTION ORAL at 08:06

## 2021-06-02 RX ADMIN — SIMVASTATIN 40 MG: 40 TABLET, FILM COATED ORAL at 08:06

## 2021-06-02 RX ADMIN — RIVAROXABAN 10 MG: 10 TABLET, FILM COATED ORAL at 04:06

## 2021-06-02 RX ADMIN — VENLAFAXINE 75 MG: 75 TABLET ORAL at 09:06

## 2021-06-02 RX ADMIN — LIDOCAINE HYDROCHLORIDE 5 ML: 20 SOLUTION ORAL; TOPICAL at 09:06

## 2021-06-02 RX ADMIN — POTASSIUM CHLORIDE 10 MEQ: 750 TABLET, FILM COATED, EXTENDED RELEASE ORAL at 08:06

## 2021-06-02 RX ADMIN — VERAPAMIL HYDROCHLORIDE 240 MG: 120 TABLET, FILM COATED, EXTENDED RELEASE ORAL at 08:06

## 2021-06-02 RX ADMIN — LOSARTAN POTASSIUM 100 MG: 50 TABLET, FILM COATED ORAL at 09:06

## 2021-06-02 RX ADMIN — LEVOTHYROXINE SODIUM 88 MCG: 88 TABLET ORAL at 05:06

## 2021-06-02 RX ADMIN — CETIRIZINE HYDROCHLORIDE 10 MG: 10 TABLET, FILM COATED ORAL at 09:06

## 2021-06-02 RX ADMIN — MIRTAZAPINE 7.5 MG: 7.5 TABLET ORAL at 08:06

## 2021-06-02 RX ADMIN — LIDOCAINE HYDROCHLORIDE 5 ML: 20 SOLUTION ORAL; TOPICAL at 08:06

## 2021-06-02 RX ADMIN — PANTOPRAZOLE SODIUM 40 MG: 40 TABLET, DELAYED RELEASE ORAL at 09:06

## 2021-06-02 RX ADMIN — POTASSIUM CHLORIDE 10 MEQ: 750 TABLET, FILM COATED, EXTENDED RELEASE ORAL at 09:06

## 2021-06-02 RX ADMIN — LIDOCAINE HYDROCHLORIDE 5 ML: 20 SOLUTION ORAL; TOPICAL at 03:06

## 2021-06-03 PROCEDURE — 25000003 PHARM REV CODE 250: Performed by: NURSE PRACTITIONER

## 2021-06-03 PROCEDURE — 25000003 PHARM REV CODE 250: Performed by: INTERNAL MEDICINE

## 2021-06-03 PROCEDURE — 97110 THERAPEUTIC EXERCISES: CPT

## 2021-06-03 PROCEDURE — 97530 THERAPEUTIC ACTIVITIES: CPT

## 2021-06-03 PROCEDURE — 11000001 HC ACUTE MED/SURG PRIVATE ROOM

## 2021-06-03 PROCEDURE — 97535 SELF CARE MNGMENT TRAINING: CPT

## 2021-06-03 RX ADMIN — RIVAROXABAN 10 MG: 10 TABLET, FILM COATED ORAL at 05:06

## 2021-06-03 RX ADMIN — POTASSIUM CHLORIDE 10 MEQ: 750 TABLET, FILM COATED, EXTENDED RELEASE ORAL at 09:06

## 2021-06-03 RX ADMIN — VENLAFAXINE 75 MG: 75 TABLET ORAL at 08:06

## 2021-06-03 RX ADMIN — LIDOCAINE HYDROCHLORIDE 5 ML: 20 SOLUTION ORAL; TOPICAL at 08:06

## 2021-06-03 RX ADMIN — SIMVASTATIN 40 MG: 40 TABLET, FILM COATED ORAL at 09:06

## 2021-06-03 RX ADMIN — LEVOTHYROXINE SODIUM 88 MCG: 88 TABLET ORAL at 05:06

## 2021-06-03 RX ADMIN — VERAPAMIL HYDROCHLORIDE 240 MG: 120 TABLET, FILM COATED, EXTENDED RELEASE ORAL at 09:06

## 2021-06-03 RX ADMIN — LOSARTAN POTASSIUM 100 MG: 50 TABLET, FILM COATED ORAL at 08:06

## 2021-06-03 RX ADMIN — MIRTAZAPINE 7.5 MG: 7.5 TABLET ORAL at 09:06

## 2021-06-03 RX ADMIN — PANTOPRAZOLE SODIUM 40 MG: 40 TABLET, DELAYED RELEASE ORAL at 08:06

## 2021-06-03 RX ADMIN — POLYETHYLENE GLYCOL (3350) 17 G: 17 POWDER, FOR SOLUTION ORAL at 09:06

## 2021-06-03 RX ADMIN — LIDOCAINE HYDROCHLORIDE 5 ML: 20 SOLUTION ORAL; TOPICAL at 09:06

## 2021-06-03 RX ADMIN — CETIRIZINE HYDROCHLORIDE 10 MG: 10 TABLET, FILM COATED ORAL at 08:06

## 2021-06-03 RX ADMIN — POTASSIUM CHLORIDE 10 MEQ: 750 TABLET, FILM COATED, EXTENDED RELEASE ORAL at 08:06

## 2021-06-03 RX ADMIN — LIDOCAINE HYDROCHLORIDE 5 ML: 20 SOLUTION ORAL; TOPICAL at 03:06

## 2021-06-04 VITALS
WEIGHT: 107 LBS | HEIGHT: 67 IN | RESPIRATION RATE: 20 BRPM | BODY MASS INDEX: 16.79 KG/M2 | TEMPERATURE: 98 F | SYSTOLIC BLOOD PRESSURE: 145 MMHG | HEART RATE: 67 BPM | OXYGEN SATURATION: 97 % | DIASTOLIC BLOOD PRESSURE: 67 MMHG

## 2021-06-04 LAB
ALBUMIN SERPL BCP-MCNC: 3 G/DL (ref 3.5–5.2)
ALP SERPL-CCNC: 50 U/L (ref 55–135)
ALT SERPL W/O P-5'-P-CCNC: 24 U/L (ref 10–44)
ANION GAP SERPL CALC-SCNC: 2 MMOL/L (ref 8–16)
AST SERPL-CCNC: 19 U/L (ref 10–40)
BASOPHILS # BLD AUTO: 0.07 K/UL (ref 0–0.2)
BASOPHILS NFR BLD: 1.1 % (ref 0–1.9)
BILIRUB SERPL-MCNC: 0.4 MG/DL (ref 0.1–1)
BUN SERPL-MCNC: 25 MG/DL (ref 8–23)
CALCIUM SERPL-MCNC: 9.5 MG/DL (ref 8.7–10.5)
CHLORIDE SERPL-SCNC: 112 MMOL/L (ref 95–110)
CO2 SERPL-SCNC: 31 MMOL/L (ref 23–29)
CREAT SERPL-MCNC: 0.9 MG/DL (ref 0.5–1.4)
DIFFERENTIAL METHOD: ABNORMAL
EOSINOPHIL # BLD AUTO: 0.2 K/UL (ref 0–0.5)
EOSINOPHIL NFR BLD: 3.3 % (ref 0–8)
ERYTHROCYTE [DISTWIDTH] IN BLOOD BY AUTOMATED COUNT: 12.7 % (ref 11.5–14.5)
EST. GFR  (AFRICAN AMERICAN): >60 ML/MIN/1.73 M^2
EST. GFR  (NON AFRICAN AMERICAN): 59.3 ML/MIN/1.73 M^2
GLUCOSE SERPL-MCNC: 92 MG/DL (ref 70–110)
HCT VFR BLD AUTO: 31.7 % (ref 37–48.5)
HGB BLD-MCNC: 9.9 G/DL (ref 12–16)
IMM GRANULOCYTES # BLD AUTO: 0.02 K/UL (ref 0–0.04)
IMM GRANULOCYTES NFR BLD AUTO: 0.3 % (ref 0–0.5)
LYMPHOCYTES # BLD AUTO: 1.6 K/UL (ref 1–4.8)
LYMPHOCYTES NFR BLD: 25.1 % (ref 18–48)
MAGNESIUM SERPL-MCNC: 2.2 MG/DL (ref 1.6–2.6)
MCH RBC QN AUTO: 30.6 PG (ref 27–31)
MCHC RBC AUTO-ENTMCNC: 31.2 G/DL (ref 32–36)
MCV RBC AUTO: 98 FL (ref 82–98)
MONOCYTES # BLD AUTO: 0.9 K/UL (ref 0.3–1)
MONOCYTES NFR BLD: 13.4 % (ref 4–15)
NEUTROPHILS # BLD AUTO: 3.6 K/UL (ref 1.8–7.7)
NEUTROPHILS NFR BLD: 56.8 % (ref 38–73)
NRBC BLD-RTO: 0 /100 WBC
PLATELET # BLD AUTO: 363 K/UL (ref 150–450)
PMV BLD AUTO: 10.3 FL (ref 9.2–12.9)
POTASSIUM SERPL-SCNC: 5.5 MMOL/L (ref 3.5–5.1)
PROT SERPL-MCNC: 6.2 G/DL (ref 6–8.4)
RBC # BLD AUTO: 3.24 M/UL (ref 4–5.4)
SODIUM SERPL-SCNC: 145 MMOL/L (ref 136–145)
WBC # BLD AUTO: 6.33 K/UL (ref 3.9–12.7)

## 2021-06-04 PROCEDURE — 36415 COLL VENOUS BLD VENIPUNCTURE: CPT | Performed by: NURSE PRACTITIONER

## 2021-06-04 PROCEDURE — 25000003 PHARM REV CODE 250: Performed by: NURSE PRACTITIONER

## 2021-06-04 PROCEDURE — 83735 ASSAY OF MAGNESIUM: CPT | Performed by: NURSE PRACTITIONER

## 2021-06-04 PROCEDURE — 85025 COMPLETE CBC W/AUTO DIFF WBC: CPT | Performed by: NURSE PRACTITIONER

## 2021-06-04 PROCEDURE — 80053 COMPREHEN METABOLIC PANEL: CPT | Performed by: NURSE PRACTITIONER

## 2021-06-04 PROCEDURE — 25000003 PHARM REV CODE 250: Performed by: INTERNAL MEDICINE

## 2021-06-04 RX ORDER — ACETAMINOPHEN 325 MG/1
650 TABLET ORAL EVERY 8 HOURS PRN
Refills: 0
Start: 2021-06-04

## 2021-06-04 RX ORDER — MIRTAZAPINE 7.5 MG/1
7.5 TABLET, FILM COATED ORAL NIGHTLY
Qty: 30 TABLET | Refills: 0 | Status: SHIPPED | OUTPATIENT
Start: 2021-06-04 | End: 2022-03-29

## 2021-06-04 RX ORDER — CETIRIZINE HYDROCHLORIDE 10 MG/1
10 TABLET ORAL DAILY
Refills: 0 | Status: ON HOLD
Start: 2021-06-05 | End: 2022-11-17

## 2021-06-04 RX ORDER — MAG HYDROX/ALUMINUM HYD/SIMETH 200-200-20
30 SUSPENSION, ORAL (FINAL DOSE FORM) ORAL EVERY 6 HOURS PRN
Status: ON HOLD
Start: 2021-06-04 | End: 2021-07-06 | Stop reason: HOSPADM

## 2021-06-04 RX ADMIN — PANTOPRAZOLE SODIUM 40 MG: 40 TABLET, DELAYED RELEASE ORAL at 09:06

## 2021-06-04 RX ADMIN — LOSARTAN POTASSIUM 100 MG: 50 TABLET, FILM COATED ORAL at 09:06

## 2021-06-04 RX ADMIN — LIDOCAINE HYDROCHLORIDE 5 ML: 20 SOLUTION ORAL; TOPICAL at 09:06

## 2021-06-04 RX ADMIN — CETIRIZINE HYDROCHLORIDE 10 MG: 10 TABLET, FILM COATED ORAL at 09:06

## 2021-06-04 RX ADMIN — LEVOTHYROXINE SODIUM 88 MCG: 88 TABLET ORAL at 05:06

## 2021-06-04 RX ADMIN — VENLAFAXINE 75 MG: 75 TABLET ORAL at 09:06

## 2021-06-04 RX ADMIN — POTASSIUM CHLORIDE 10 MEQ: 750 TABLET, FILM COATED, EXTENDED RELEASE ORAL at 09:06

## 2021-07-01 ENCOUNTER — HOSPITAL ENCOUNTER (INPATIENT)
Facility: HOSPITAL | Age: 86
LOS: 3 days | Discharge: HOME HEALTH | DRG: 689 | End: 2021-07-06
Attending: EMERGENCY MEDICINE | Admitting: EMERGENCY MEDICINE
Payer: MEDICARE

## 2021-07-01 DIAGNOSIS — N30.00 ACUTE CYSTITIS WITHOUT HEMATURIA: ICD-10-CM

## 2021-07-01 DIAGNOSIS — R41.82 ALTERED MENTAL STATUS: Primary | ICD-10-CM

## 2021-07-01 LAB
ALBUMIN SERPL BCP-MCNC: 3 G/DL (ref 3.5–5.2)
ALP SERPL-CCNC: 42 U/L (ref 55–135)
ALT SERPL W/O P-5'-P-CCNC: 15 U/L (ref 10–44)
ANION GAP SERPL CALC-SCNC: 6 MMOL/L (ref 8–16)
AST SERPL-CCNC: 16 U/L (ref 10–40)
BACTERIA #/AREA URNS HPF: NEGATIVE /HPF
BASOPHILS # BLD AUTO: 0.03 K/UL (ref 0–0.2)
BASOPHILS NFR BLD: 0.4 % (ref 0–1.9)
BILIRUB SERPL-MCNC: 0.4 MG/DL (ref 0.1–1)
BILIRUB UR QL STRIP: NEGATIVE
BUN SERPL-MCNC: 16 MG/DL (ref 8–23)
CALCIUM SERPL-MCNC: 9 MG/DL (ref 8.7–10.5)
CHLORIDE SERPL-SCNC: 108 MMOL/L (ref 95–110)
CLARITY UR: CLEAR
CO2 SERPL-SCNC: 28 MMOL/L (ref 23–29)
COLOR UR: YELLOW
CREAT SERPL-MCNC: 0.8 MG/DL (ref 0.5–1.4)
DIFFERENTIAL METHOD: ABNORMAL
EOSINOPHIL # BLD AUTO: 0 K/UL (ref 0–0.5)
EOSINOPHIL NFR BLD: 0.3 % (ref 0–8)
ERYTHROCYTE [DISTWIDTH] IN BLOOD BY AUTOMATED COUNT: 13.7 % (ref 11.5–14.5)
EST. GFR  (AFRICAN AMERICAN): >60 ML/MIN/1.73 M^2
EST. GFR  (NON AFRICAN AMERICAN): >60 ML/MIN/1.73 M^2
GLUCOSE SERPL-MCNC: 115 MG/DL (ref 70–110)
GLUCOSE UR QL STRIP: NEGATIVE
HCT VFR BLD AUTO: 28.8 % (ref 37–48.5)
HGB BLD-MCNC: 9.5 G/DL (ref 12–16)
HGB UR QL STRIP: ABNORMAL
HYALINE CASTS #/AREA URNS LPF: 1 /LPF
IMM GRANULOCYTES # BLD AUTO: 0.02 K/UL (ref 0–0.04)
IMM GRANULOCYTES NFR BLD AUTO: 0.3 % (ref 0–0.5)
KETONES UR QL STRIP: NEGATIVE
LACTATE SERPL-SCNC: 0.9 MMOL/L (ref 0.5–2.2)
LACTATE SERPL-SCNC: 0.9 MMOL/L (ref 0.5–2.2)
LEUKOCYTE ESTERASE UR QL STRIP: ABNORMAL
LYMPHOCYTES # BLD AUTO: 0.6 K/UL (ref 1–4.8)
LYMPHOCYTES NFR BLD: 8.7 % (ref 18–48)
MCH RBC QN AUTO: 31.6 PG (ref 27–31)
MCHC RBC AUTO-ENTMCNC: 33 G/DL (ref 32–36)
MCV RBC AUTO: 96 FL (ref 82–98)
MICROSCOPIC COMMENT: ABNORMAL
MONOCYTES # BLD AUTO: 1.1 K/UL (ref 0.3–1)
MONOCYTES NFR BLD: 14.7 % (ref 4–15)
NEUTROPHILS # BLD AUTO: 5.6 K/UL (ref 1.8–7.7)
NEUTROPHILS NFR BLD: 75.6 % (ref 38–73)
NITRITE UR QL STRIP: NEGATIVE
NRBC BLD-RTO: 0 /100 WBC
PH UR STRIP: 7 [PH] (ref 5–8)
PLATELET # BLD AUTO: 172 K/UL (ref 150–450)
PMV BLD AUTO: 10.5 FL (ref 9.2–12.9)
POTASSIUM SERPL-SCNC: 3.3 MMOL/L (ref 3.5–5.1)
PROT SERPL-MCNC: 6.2 G/DL (ref 6–8.4)
PROT UR QL STRIP: ABNORMAL
RBC # BLD AUTO: 3.01 M/UL (ref 4–5.4)
RBC #/AREA URNS HPF: 4 /HPF (ref 0–4)
SODIUM SERPL-SCNC: 142 MMOL/L (ref 136–145)
SP GR UR STRIP: 1.01 (ref 1–1.03)
SQUAMOUS #/AREA URNS HPF: 1 /HPF
URN SPEC COLLECT METH UR: ABNORMAL
UROBILINOGEN UR STRIP-ACNC: NEGATIVE EU/DL
WBC # BLD AUTO: 7.35 K/UL (ref 3.9–12.7)
WBC #/AREA URNS HPF: 15 /HPF (ref 0–5)

## 2021-07-01 PROCEDURE — 63600175 PHARM REV CODE 636 W HCPCS: Performed by: EMERGENCY MEDICINE

## 2021-07-01 PROCEDURE — 80053 COMPREHEN METABOLIC PANEL: CPT | Performed by: EMERGENCY MEDICINE

## 2021-07-01 PROCEDURE — 87088 URINE BACTERIA CULTURE: CPT | Performed by: EMERGENCY MEDICINE

## 2021-07-01 PROCEDURE — 85025 COMPLETE CBC W/AUTO DIFF WBC: CPT | Performed by: EMERGENCY MEDICINE

## 2021-07-01 PROCEDURE — G0378 HOSPITAL OBSERVATION PER HR: HCPCS | Mod: OBSCO

## 2021-07-01 PROCEDURE — 87077 CULTURE AEROBIC IDENTIFY: CPT | Performed by: EMERGENCY MEDICINE

## 2021-07-01 PROCEDURE — 96361 HYDRATE IV INFUSION ADD-ON: CPT

## 2021-07-01 PROCEDURE — 93005 ELECTROCARDIOGRAM TRACING: CPT

## 2021-07-01 PROCEDURE — 25000003 PHARM REV CODE 250: Performed by: EMERGENCY MEDICINE

## 2021-07-01 PROCEDURE — 96374 THER/PROPH/DIAG INJ IV PUSH: CPT

## 2021-07-01 PROCEDURE — 81000 URINALYSIS NONAUTO W/SCOPE: CPT | Performed by: EMERGENCY MEDICINE

## 2021-07-01 PROCEDURE — 93010 ELECTROCARDIOGRAM REPORT: CPT | Mod: ,,, | Performed by: INTERNAL MEDICINE

## 2021-07-01 PROCEDURE — 83605 ASSAY OF LACTIC ACID: CPT | Mod: 91 | Performed by: EMERGENCY MEDICINE

## 2021-07-01 PROCEDURE — 93010 EKG 12-LEAD: ICD-10-PCS | Mod: ,,, | Performed by: INTERNAL MEDICINE

## 2021-07-01 PROCEDURE — 99285 EMERGENCY DEPT VISIT HI MDM: CPT | Mod: 25

## 2021-07-01 PROCEDURE — G0378 HOSPITAL OBSERVATION PER HR: HCPCS

## 2021-07-01 PROCEDURE — 36415 COLL VENOUS BLD VENIPUNCTURE: CPT | Performed by: EMERGENCY MEDICINE

## 2021-07-01 PROCEDURE — 87040 BLOOD CULTURE FOR BACTERIA: CPT | Mod: 59 | Performed by: EMERGENCY MEDICINE

## 2021-07-01 PROCEDURE — 87086 URINE CULTURE/COLONY COUNT: CPT | Performed by: EMERGENCY MEDICINE

## 2021-07-01 PROCEDURE — 87186 SC STD MICRODIL/AGAR DIL: CPT | Performed by: EMERGENCY MEDICINE

## 2021-07-01 RX ORDER — PREDNISONE 5 MG/1
2.5 TABLET ORAL DAILY
COMMUNITY
End: 2022-03-28 | Stop reason: SDUPTHER

## 2021-07-01 RX ORDER — SODIUM CHLORIDE 0.9 % (FLUSH) 0.9 %
10 SYRINGE (ML) INJECTION
Status: DISCONTINUED | OUTPATIENT
Start: 2021-07-01 | End: 2021-07-06 | Stop reason: HOSPADM

## 2021-07-01 RX ORDER — ACETAMINOPHEN 325 MG/1
650 TABLET ORAL EVERY 8 HOURS PRN
Status: DISCONTINUED | OUTPATIENT
Start: 2021-07-01 | End: 2021-07-06 | Stop reason: HOSPADM

## 2021-07-01 RX ORDER — TALC
6 POWDER (GRAM) TOPICAL NIGHTLY PRN
Status: DISCONTINUED | OUTPATIENT
Start: 2021-07-01 | End: 2021-07-06 | Stop reason: HOSPADM

## 2021-07-01 RX ORDER — LEVOTHYROXINE SODIUM 88 UG/1
88 TABLET ORAL
Status: DISCONTINUED | OUTPATIENT
Start: 2021-07-02 | End: 2021-07-06 | Stop reason: HOSPADM

## 2021-07-01 RX ORDER — FAMOTIDINE 20 MG/1
20 TABLET, FILM COATED ORAL DAILY
Status: DISCONTINUED | OUTPATIENT
Start: 2021-07-01 | End: 2021-07-02

## 2021-07-01 RX ORDER — FAMOTIDINE 20 MG/1
20 TABLET, FILM COATED ORAL 2 TIMES DAILY
COMMUNITY
End: 2022-03-28 | Stop reason: SDUPTHER

## 2021-07-01 RX ORDER — LOSARTAN POTASSIUM 50 MG/1
50 TABLET ORAL DAILY
Status: DISCONTINUED | OUTPATIENT
Start: 2021-07-01 | End: 2021-07-05

## 2021-07-01 RX ORDER — SODIUM CHLORIDE 9 MG/ML
INJECTION, SOLUTION INTRAVENOUS CONTINUOUS
Status: DISCONTINUED | OUTPATIENT
Start: 2021-07-01 | End: 2021-07-03

## 2021-07-01 RX ORDER — ONDANSETRON 2 MG/ML
4 INJECTION INTRAMUSCULAR; INTRAVENOUS EVERY 8 HOURS PRN
Status: DISCONTINUED | OUTPATIENT
Start: 2021-07-01 | End: 2021-07-06 | Stop reason: HOSPADM

## 2021-07-01 RX ORDER — VENLAFAXINE 75 MG/1
75 TABLET ORAL DAILY
Status: DISCONTINUED | OUTPATIENT
Start: 2021-07-01 | End: 2021-07-02

## 2021-07-01 RX ORDER — ATORVASTATIN CALCIUM 40 MG/1
40 TABLET, FILM COATED ORAL DAILY
Status: DISCONTINUED | OUTPATIENT
Start: 2021-07-01 | End: 2021-07-02

## 2021-07-01 RX ADMIN — CEFTRIAXONE SODIUM 1 G: 1 INJECTION, POWDER, FOR SOLUTION INTRAMUSCULAR; INTRAVENOUS at 05:07

## 2021-07-01 RX ADMIN — SODIUM CHLORIDE: 0.9 INJECTION, SOLUTION INTRAVENOUS at 03:07

## 2021-07-01 RX ADMIN — SODIUM CHLORIDE, SODIUM LACTATE, POTASSIUM CHLORIDE, AND CALCIUM CHLORIDE 1000 ML: .6; .31; .03; .02 INJECTION, SOLUTION INTRAVENOUS at 01:07

## 2021-07-01 RX ADMIN — SODIUM CHLORIDE: 0.9 INJECTION, SOLUTION INTRAVENOUS at 11:07

## 2021-07-02 PROBLEM — D64.9 ANEMIA: Status: ACTIVE | Noted: 2021-07-02

## 2021-07-02 PROBLEM — N30.00 ACUTE CYSTITIS WITHOUT HEMATURIA: Status: ACTIVE | Noted: 2021-07-02

## 2021-07-02 PROBLEM — E03.9 HYPOTHYROID: Status: ACTIVE | Noted: 2021-07-02

## 2021-07-02 LAB
ALBUMIN SERPL BCP-MCNC: 2.7 G/DL (ref 3.5–5.2)
ALP SERPL-CCNC: 37 U/L (ref 55–135)
ALT SERPL W/O P-5'-P-CCNC: 13 U/L (ref 10–44)
ANION GAP SERPL CALC-SCNC: 7 MMOL/L (ref 8–16)
AST SERPL-CCNC: 16 U/L (ref 10–40)
BASOPHILS # BLD AUTO: 0.03 K/UL (ref 0–0.2)
BASOPHILS NFR BLD: 0.4 % (ref 0–1.9)
BILIRUB SERPL-MCNC: 0.3 MG/DL (ref 0.1–1)
BUN SERPL-MCNC: 12 MG/DL (ref 8–23)
CALCIUM SERPL-MCNC: 8.5 MG/DL (ref 8.7–10.5)
CHLORIDE SERPL-SCNC: 111 MMOL/L (ref 95–110)
CO2 SERPL-SCNC: 25 MMOL/L (ref 23–29)
CREAT SERPL-MCNC: 0.6 MG/DL (ref 0.5–1.4)
DIFFERENTIAL METHOD: ABNORMAL
EOSINOPHIL # BLD AUTO: 0 K/UL (ref 0–0.5)
EOSINOPHIL NFR BLD: 0.3 % (ref 0–8)
ERYTHROCYTE [DISTWIDTH] IN BLOOD BY AUTOMATED COUNT: 13.4 % (ref 11.5–14.5)
EST. GFR  (AFRICAN AMERICAN): >60 ML/MIN/1.73 M^2
EST. GFR  (NON AFRICAN AMERICAN): >60 ML/MIN/1.73 M^2
GLUCOSE SERPL-MCNC: 97 MG/DL (ref 70–110)
HCT VFR BLD AUTO: 27 % (ref 37–48.5)
HGB BLD-MCNC: 8.7 G/DL (ref 12–16)
IMM GRANULOCYTES # BLD AUTO: 0.02 K/UL (ref 0–0.04)
IMM GRANULOCYTES NFR BLD AUTO: 0.3 % (ref 0–0.5)
LYMPHOCYTES # BLD AUTO: 0.9 K/UL (ref 1–4.8)
LYMPHOCYTES NFR BLD: 12.9 % (ref 18–48)
MCH RBC QN AUTO: 31 PG (ref 27–31)
MCHC RBC AUTO-ENTMCNC: 32.2 G/DL (ref 32–36)
MCV RBC AUTO: 96 FL (ref 82–98)
MONOCYTES # BLD AUTO: 1.1 K/UL (ref 0.3–1)
MONOCYTES NFR BLD: 15.9 % (ref 4–15)
NEUTROPHILS # BLD AUTO: 4.9 K/UL (ref 1.8–7.7)
NEUTROPHILS NFR BLD: 70.2 % (ref 38–73)
NRBC BLD-RTO: 0 /100 WBC
PLATELET # BLD AUTO: 150 K/UL (ref 150–450)
PMV BLD AUTO: 11 FL (ref 9.2–12.9)
POTASSIUM SERPL-SCNC: 3.1 MMOL/L (ref 3.5–5.1)
PROT SERPL-MCNC: 5.7 G/DL (ref 6–8.4)
RBC # BLD AUTO: 2.81 M/UL (ref 4–5.4)
SODIUM SERPL-SCNC: 143 MMOL/L (ref 136–145)
WBC # BLD AUTO: 6.98 K/UL (ref 3.9–12.7)

## 2021-07-02 PROCEDURE — 85025 COMPLETE CBC W/AUTO DIFF WBC: CPT | Performed by: EMERGENCY MEDICINE

## 2021-07-02 PROCEDURE — G0378 HOSPITAL OBSERVATION PER HR: HCPCS

## 2021-07-02 PROCEDURE — 63600175 PHARM REV CODE 636 W HCPCS: Performed by: EMERGENCY MEDICINE

## 2021-07-02 PROCEDURE — 97161 PT EVAL LOW COMPLEX 20 MIN: CPT

## 2021-07-02 PROCEDURE — 80053 COMPREHEN METABOLIC PANEL: CPT | Performed by: EMERGENCY MEDICINE

## 2021-07-02 PROCEDURE — 25000003 PHARM REV CODE 250: Performed by: INTERNAL MEDICINE

## 2021-07-02 PROCEDURE — 96361 HYDRATE IV INFUSION ADD-ON: CPT

## 2021-07-02 PROCEDURE — 36415 COLL VENOUS BLD VENIPUNCTURE: CPT | Performed by: EMERGENCY MEDICINE

## 2021-07-02 PROCEDURE — 63600175 PHARM REV CODE 636 W HCPCS: Performed by: INTERNAL MEDICINE

## 2021-07-02 PROCEDURE — 25000003 PHARM REV CODE 250: Performed by: EMERGENCY MEDICINE

## 2021-07-02 PROCEDURE — 97165 OT EVAL LOW COMPLEX 30 MIN: CPT

## 2021-07-02 PROCEDURE — 96376 TX/PRO/DX INJ SAME DRUG ADON: CPT

## 2021-07-02 RX ORDER — SIMVASTATIN 40 MG/1
40 TABLET, FILM COATED ORAL NIGHTLY
Status: DISCONTINUED | OUTPATIENT
Start: 2021-07-02 | End: 2021-07-06 | Stop reason: HOSPADM

## 2021-07-02 RX ORDER — DOCUSATE SODIUM 100 MG/1
100 CAPSULE, LIQUID FILLED ORAL NIGHTLY
Status: DISCONTINUED | OUTPATIENT
Start: 2021-07-02 | End: 2021-07-06 | Stop reason: HOSPADM

## 2021-07-02 RX ORDER — POTASSIUM CHLORIDE 20 MEQ/1
20 TABLET, EXTENDED RELEASE ORAL 2 TIMES DAILY
Status: DISCONTINUED | OUTPATIENT
Start: 2021-07-02 | End: 2021-07-03

## 2021-07-02 RX ORDER — FAMOTIDINE 20 MG/1
20 TABLET, FILM COATED ORAL DAILY
Status: DISCONTINUED | OUTPATIENT
Start: 2021-07-03 | End: 2021-07-06 | Stop reason: HOSPADM

## 2021-07-02 RX ORDER — HYDROXYCHLOROQUINE SULFATE 200 MG/1
200 TABLET, FILM COATED ORAL EVERY OTHER DAY
Status: DISCONTINUED | OUTPATIENT
Start: 2021-07-03 | End: 2021-07-06 | Stop reason: HOSPADM

## 2021-07-02 RX ORDER — PREDNISONE 2.5 MG/1
2.5 TABLET ORAL DAILY
Status: DISCONTINUED | OUTPATIENT
Start: 2021-07-02 | End: 2021-07-06 | Stop reason: HOSPADM

## 2021-07-02 RX ADMIN — ACETAMINOPHEN 650 MG: 325 TABLET ORAL at 08:07

## 2021-07-02 RX ADMIN — CEFTRIAXONE SODIUM 1 G: 1 INJECTION, POWDER, FOR SOLUTION INTRAMUSCULAR; INTRAVENOUS at 03:07

## 2021-07-02 RX ADMIN — SIMVASTATIN 40 MG: 40 TABLET, FILM COATED ORAL at 10:07

## 2021-07-02 RX ADMIN — POTASSIUM CHLORIDE 20 MEQ: 1500 TABLET, EXTENDED RELEASE ORAL at 10:07

## 2021-07-02 RX ADMIN — LIDOCAINE HYDROCHLORIDE 15 ML: 20 SOLUTION ORAL; TOPICAL at 10:07

## 2021-07-02 RX ADMIN — ATORVASTATIN CALCIUM 40 MG: 40 TABLET, FILM COATED ORAL at 08:07

## 2021-07-02 RX ADMIN — SODIUM CHLORIDE: 0.9 INJECTION, SOLUTION INTRAVENOUS at 03:07

## 2021-07-02 RX ADMIN — LEVOTHYROXINE SODIUM 88 MCG: 0.09 TABLET ORAL at 05:07

## 2021-07-02 RX ADMIN — PREDNISONE 2.5 MG: 2.5 TABLET ORAL at 10:07

## 2021-07-02 RX ADMIN — FAMOTIDINE 20 MG: 20 TABLET, FILM COATED ORAL at 08:07

## 2021-07-02 RX ADMIN — LOSARTAN POTASSIUM 50 MG: 50 TABLET ORAL at 08:07

## 2021-07-02 RX ADMIN — DOCUSATE SODIUM 100 MG: 100 CAPSULE, LIQUID FILLED ORAL at 10:07

## 2021-07-02 RX ADMIN — CEFTRIAXONE SODIUM 1 G: 1 INJECTION, POWDER, FOR SOLUTION INTRAMUSCULAR; INTRAVENOUS at 05:07

## 2021-07-02 RX ADMIN — ACETAMINOPHEN 650 MG: 325 TABLET ORAL at 10:07

## 2021-07-03 LAB
ALBUMIN SERPL BCP-MCNC: 2.6 G/DL (ref 3.5–5.2)
ALP SERPL-CCNC: 43 U/L (ref 55–135)
ALT SERPL W/O P-5'-P-CCNC: 18 U/L (ref 10–44)
ANION GAP SERPL CALC-SCNC: 8 MMOL/L (ref 8–16)
AST SERPL-CCNC: 22 U/L (ref 10–40)
BASOPHILS # BLD AUTO: 0.04 K/UL (ref 0–0.2)
BASOPHILS NFR BLD: 0.6 % (ref 0–1.9)
BILIRUB SERPL-MCNC: 0.3 MG/DL (ref 0.1–1)
BUN SERPL-MCNC: 9 MG/DL (ref 8–23)
CALCIUM SERPL-MCNC: 8.7 MG/DL (ref 8.7–10.5)
CHLORIDE SERPL-SCNC: 113 MMOL/L (ref 95–110)
CO2 SERPL-SCNC: 25 MMOL/L (ref 23–29)
CREAT SERPL-MCNC: 0.6 MG/DL (ref 0.5–1.4)
DIFFERENTIAL METHOD: ABNORMAL
EOSINOPHIL # BLD AUTO: 0.1 K/UL (ref 0–0.5)
EOSINOPHIL NFR BLD: 1.7 % (ref 0–8)
ERYTHROCYTE [DISTWIDTH] IN BLOOD BY AUTOMATED COUNT: 13.7 % (ref 11.5–14.5)
EST. GFR  (AFRICAN AMERICAN): >60 ML/MIN/1.73 M^2
EST. GFR  (NON AFRICAN AMERICAN): >60 ML/MIN/1.73 M^2
GLUCOSE SERPL-MCNC: 86 MG/DL (ref 70–110)
HCT VFR BLD AUTO: 26.8 % (ref 37–48.5)
HGB BLD-MCNC: 8.5 G/DL (ref 12–16)
IMM GRANULOCYTES # BLD AUTO: 0.01 K/UL (ref 0–0.04)
IMM GRANULOCYTES NFR BLD AUTO: 0.2 % (ref 0–0.5)
LYMPHOCYTES # BLD AUTO: 1.1 K/UL (ref 1–4.8)
LYMPHOCYTES NFR BLD: 16.4 % (ref 18–48)
MAGNESIUM SERPL-MCNC: 1.6 MG/DL (ref 1.6–2.6)
MCH RBC QN AUTO: 30.8 PG (ref 27–31)
MCHC RBC AUTO-ENTMCNC: 31.7 G/DL (ref 32–36)
MCV RBC AUTO: 97 FL (ref 82–98)
MONOCYTES # BLD AUTO: 1 K/UL (ref 0.3–1)
MONOCYTES NFR BLD: 14.3 % (ref 4–15)
NEUTROPHILS # BLD AUTO: 4.5 K/UL (ref 1.8–7.7)
NEUTROPHILS NFR BLD: 66.8 % (ref 38–73)
NRBC BLD-RTO: 0 /100 WBC
PLATELET # BLD AUTO: 156 K/UL (ref 150–450)
PMV BLD AUTO: 11.3 FL (ref 9.2–12.9)
POTASSIUM SERPL-SCNC: 3.6 MMOL/L (ref 3.5–5.1)
PROT SERPL-MCNC: 5.9 G/DL (ref 6–8.4)
RBC # BLD AUTO: 2.76 M/UL (ref 4–5.4)
SODIUM SERPL-SCNC: 146 MMOL/L (ref 136–145)
WBC # BLD AUTO: 6.65 K/UL (ref 3.9–12.7)

## 2021-07-03 PROCEDURE — 25000003 PHARM REV CODE 250: Performed by: INTERNAL MEDICINE

## 2021-07-03 PROCEDURE — 83735 ASSAY OF MAGNESIUM: CPT | Performed by: INTERNAL MEDICINE

## 2021-07-03 PROCEDURE — 63600175 PHARM REV CODE 636 W HCPCS: Performed by: INTERNAL MEDICINE

## 2021-07-03 PROCEDURE — 11000001 HC ACUTE MED/SURG PRIVATE ROOM

## 2021-07-03 PROCEDURE — 97116 GAIT TRAINING THERAPY: CPT

## 2021-07-03 PROCEDURE — 36415 COLL VENOUS BLD VENIPUNCTURE: CPT | Performed by: INTERNAL MEDICINE

## 2021-07-03 PROCEDURE — 85025 COMPLETE CBC W/AUTO DIFF WBC: CPT | Performed by: INTERNAL MEDICINE

## 2021-07-03 PROCEDURE — 97110 THERAPEUTIC EXERCISES: CPT

## 2021-07-03 PROCEDURE — 96376 TX/PRO/DX INJ SAME DRUG ADON: CPT

## 2021-07-03 PROCEDURE — 80053 COMPREHEN METABOLIC PANEL: CPT | Performed by: INTERNAL MEDICINE

## 2021-07-03 PROCEDURE — 96375 TX/PRO/DX INJ NEW DRUG ADDON: CPT

## 2021-07-03 RX ORDER — POTASSIUM CHLORIDE 20 MEQ/1
20 TABLET, EXTENDED RELEASE ORAL DAILY
Status: DISCONTINUED | OUTPATIENT
Start: 2021-07-04 | End: 2021-07-04

## 2021-07-03 RX ORDER — VANCOMYCIN HCL IN 5 % DEXTROSE 1G/250ML
1000 PLASTIC BAG, INJECTION (ML) INTRAVENOUS
Status: DISCONTINUED | OUTPATIENT
Start: 2021-07-04 | End: 2021-07-05

## 2021-07-03 RX ADMIN — DOCUSATE SODIUM 100 MG: 100 CAPSULE, LIQUID FILLED ORAL at 09:07

## 2021-07-03 RX ADMIN — LOSARTAN POTASSIUM 50 MG: 50 TABLET ORAL at 08:07

## 2021-07-03 RX ADMIN — HYDROXYCHLOROQUINE SULFATE 200 MG: 200 TABLET, FILM COATED ORAL at 08:07

## 2021-07-03 RX ADMIN — LEVOTHYROXINE SODIUM 88 MCG: 0.09 TABLET ORAL at 05:07

## 2021-07-03 RX ADMIN — FAMOTIDINE 20 MG: 20 TABLET ORAL at 08:07

## 2021-07-03 RX ADMIN — VANCOMYCIN HYDROCHLORIDE 1250 MG: 1.25 INJECTION, POWDER, LYOPHILIZED, FOR SOLUTION INTRAVENOUS at 09:07

## 2021-07-03 RX ADMIN — POTASSIUM CHLORIDE 20 MEQ: 1500 TABLET, EXTENDED RELEASE ORAL at 08:07

## 2021-07-03 RX ADMIN — CEFTRIAXONE SODIUM 1 G: 1 INJECTION, POWDER, FOR SOLUTION INTRAMUSCULAR; INTRAVENOUS at 04:07

## 2021-07-03 RX ADMIN — SIMVASTATIN 40 MG: 40 TABLET, FILM COATED ORAL at 09:07

## 2021-07-03 RX ADMIN — ACETAMINOPHEN 650 MG: 325 TABLET ORAL at 10:07

## 2021-07-03 RX ADMIN — LIDOCAINE HYDROCHLORIDE 15 ML: 20 SOLUTION ORAL; TOPICAL at 02:07

## 2021-07-03 RX ADMIN — LIDOCAINE HYDROCHLORIDE 15 ML: 20 SOLUTION ORAL; TOPICAL at 08:07

## 2021-07-03 RX ADMIN — PREDNISONE 2.5 MG: 2.5 TABLET ORAL at 08:07

## 2021-07-04 LAB
ALBUMIN SERPL BCP-MCNC: 2.5 G/DL (ref 3.5–5.2)
ALP SERPL-CCNC: 45 U/L (ref 55–135)
ALT SERPL W/O P-5'-P-CCNC: 16 U/L (ref 10–44)
ANION GAP SERPL CALC-SCNC: 6 MMOL/L (ref 8–16)
AST SERPL-CCNC: 17 U/L (ref 10–40)
BACTERIA UR CULT: ABNORMAL
BASOPHILS # BLD AUTO: 0.04 K/UL (ref 0–0.2)
BASOPHILS NFR BLD: 0.8 % (ref 0–1.9)
BILIRUB SERPL-MCNC: 0.3 MG/DL (ref 0.1–1)
BUN SERPL-MCNC: 9 MG/DL (ref 8–23)
CALCIUM SERPL-MCNC: 8.9 MG/DL (ref 8.7–10.5)
CHLORIDE SERPL-SCNC: 114 MMOL/L (ref 95–110)
CO2 SERPL-SCNC: 27 MMOL/L (ref 23–29)
CREAT SERPL-MCNC: 0.6 MG/DL (ref 0.5–1.4)
DIFFERENTIAL METHOD: ABNORMAL
EOSINOPHIL # BLD AUTO: 0.3 K/UL (ref 0–0.5)
EOSINOPHIL NFR BLD: 4.9 % (ref 0–8)
ERYTHROCYTE [DISTWIDTH] IN BLOOD BY AUTOMATED COUNT: 13.6 % (ref 11.5–14.5)
EST. GFR  (AFRICAN AMERICAN): >60 ML/MIN/1.73 M^2
EST. GFR  (NON AFRICAN AMERICAN): >60 ML/MIN/1.73 M^2
GLUCOSE SERPL-MCNC: 87 MG/DL (ref 70–110)
HCT VFR BLD AUTO: 26.9 % (ref 37–48.5)
HGB BLD-MCNC: 8.7 G/DL (ref 12–16)
IMM GRANULOCYTES # BLD AUTO: 0.01 K/UL (ref 0–0.04)
IMM GRANULOCYTES NFR BLD AUTO: 0.2 % (ref 0–0.5)
LYMPHOCYTES # BLD AUTO: 1.1 K/UL (ref 1–4.8)
LYMPHOCYTES NFR BLD: 20.8 % (ref 18–48)
MAGNESIUM SERPL-MCNC: 1.6 MG/DL (ref 1.6–2.6)
MCH RBC QN AUTO: 30.6 PG (ref 27–31)
MCHC RBC AUTO-ENTMCNC: 32.3 G/DL (ref 32–36)
MCV RBC AUTO: 95 FL (ref 82–98)
MONOCYTES # BLD AUTO: 0.7 K/UL (ref 0.3–1)
MONOCYTES NFR BLD: 12.7 % (ref 4–15)
NEUTROPHILS # BLD AUTO: 3.2 K/UL (ref 1.8–7.7)
NEUTROPHILS NFR BLD: 60.6 % (ref 38–73)
NRBC BLD-RTO: 0 /100 WBC
PLATELET # BLD AUTO: 180 K/UL (ref 150–450)
PMV BLD AUTO: 11.3 FL (ref 9.2–12.9)
POTASSIUM SERPL-SCNC: 3.3 MMOL/L (ref 3.5–5.1)
PROT SERPL-MCNC: 5.7 G/DL (ref 6–8.4)
RBC # BLD AUTO: 2.84 M/UL (ref 4–5.4)
SODIUM SERPL-SCNC: 147 MMOL/L (ref 136–145)
WBC # BLD AUTO: 5.29 K/UL (ref 3.9–12.7)

## 2021-07-04 PROCEDURE — 97110 THERAPEUTIC EXERCISES: CPT

## 2021-07-04 PROCEDURE — 97530 THERAPEUTIC ACTIVITIES: CPT

## 2021-07-04 PROCEDURE — 25000003 PHARM REV CODE 250: Performed by: INTERNAL MEDICINE

## 2021-07-04 PROCEDURE — 80053 COMPREHEN METABOLIC PANEL: CPT | Performed by: INTERNAL MEDICINE

## 2021-07-04 PROCEDURE — 11000001 HC ACUTE MED/SURG PRIVATE ROOM

## 2021-07-04 PROCEDURE — 36415 COLL VENOUS BLD VENIPUNCTURE: CPT | Performed by: INTERNAL MEDICINE

## 2021-07-04 PROCEDURE — 85025 COMPLETE CBC W/AUTO DIFF WBC: CPT | Performed by: INTERNAL MEDICINE

## 2021-07-04 PROCEDURE — 83735 ASSAY OF MAGNESIUM: CPT | Performed by: INTERNAL MEDICINE

## 2021-07-04 PROCEDURE — 63600175 PHARM REV CODE 636 W HCPCS: Performed by: INTERNAL MEDICINE

## 2021-07-04 RX ORDER — GUAIFENESIN/DEXTROMETHORPHAN 100-10MG/5
10 SYRUP ORAL EVERY 4 HOURS PRN
Status: DISCONTINUED | OUTPATIENT
Start: 2021-07-04 | End: 2021-07-06 | Stop reason: HOSPADM

## 2021-07-04 RX ORDER — POTASSIUM CHLORIDE 20 MEQ/1
20 TABLET, EXTENDED RELEASE ORAL 2 TIMES DAILY
Status: DISCONTINUED | OUTPATIENT
Start: 2021-07-04 | End: 2021-07-06 | Stop reason: HOSPADM

## 2021-07-04 RX ADMIN — POTASSIUM CHLORIDE 20 MEQ: 1500 TABLET, EXTENDED RELEASE ORAL at 09:07

## 2021-07-04 RX ADMIN — ACETAMINOPHEN 650 MG: 325 TABLET ORAL at 12:07

## 2021-07-04 RX ADMIN — PREDNISONE 2.5 MG: 2.5 TABLET ORAL at 09:07

## 2021-07-04 RX ADMIN — LOSARTAN POTASSIUM 50 MG: 50 TABLET ORAL at 09:07

## 2021-07-04 RX ADMIN — SIMVASTATIN 40 MG: 40 TABLET, FILM COATED ORAL at 09:07

## 2021-07-04 RX ADMIN — FAMOTIDINE 20 MG: 20 TABLET ORAL at 09:07

## 2021-07-04 RX ADMIN — DOCUSATE SODIUM 100 MG: 100 CAPSULE, LIQUID FILLED ORAL at 09:07

## 2021-07-04 RX ADMIN — ACETAMINOPHEN 650 MG: 325 TABLET ORAL at 09:07

## 2021-07-04 RX ADMIN — LEVOTHYROXINE SODIUM 88 MCG: 0.09 TABLET ORAL at 06:07

## 2021-07-04 RX ADMIN — VANCOMYCIN HYDROCHLORIDE 1000 MG: 1 INJECTION, POWDER, LYOPHILIZED, FOR SOLUTION INTRAVENOUS at 09:07

## 2021-07-04 RX ADMIN — GUAIFENESIN AND DEXTROMETHORPHAN 10 ML: 100; 10 SYRUP ORAL at 11:07

## 2021-07-05 LAB
ALBUMIN SERPL BCP-MCNC: 2.7 G/DL (ref 3.5–5.2)
ALP SERPL-CCNC: 47 U/L (ref 55–135)
ALT SERPL W/O P-5'-P-CCNC: 18 U/L (ref 10–44)
ANION GAP SERPL CALC-SCNC: 6 MMOL/L (ref 8–16)
AST SERPL-CCNC: 17 U/L (ref 10–40)
BASOPHILS # BLD AUTO: 0.05 K/UL (ref 0–0.2)
BASOPHILS NFR BLD: 0.9 % (ref 0–1.9)
BILIRUB SERPL-MCNC: 0.3 MG/DL (ref 0.1–1)
BUN SERPL-MCNC: 10 MG/DL (ref 8–23)
CALCIUM SERPL-MCNC: 9.4 MG/DL (ref 8.7–10.5)
CHLORIDE SERPL-SCNC: 114 MMOL/L (ref 95–110)
CO2 SERPL-SCNC: 29 MMOL/L (ref 23–29)
CREAT SERPL-MCNC: 0.6 MG/DL (ref 0.5–1.4)
DIFFERENTIAL METHOD: ABNORMAL
EOSINOPHIL # BLD AUTO: 0.4 K/UL (ref 0–0.5)
EOSINOPHIL NFR BLD: 6.7 % (ref 0–8)
ERYTHROCYTE [DISTWIDTH] IN BLOOD BY AUTOMATED COUNT: 13.5 % (ref 11.5–14.5)
EST. GFR  (AFRICAN AMERICAN): >60 ML/MIN/1.73 M^2
EST. GFR  (NON AFRICAN AMERICAN): >60 ML/MIN/1.73 M^2
GLUCOSE SERPL-MCNC: 89 MG/DL (ref 70–110)
HCT VFR BLD AUTO: 28.4 % (ref 37–48.5)
HGB BLD-MCNC: 9 G/DL (ref 12–16)
IMM GRANULOCYTES # BLD AUTO: 0.02 K/UL (ref 0–0.04)
IMM GRANULOCYTES NFR BLD AUTO: 0.4 % (ref 0–0.5)
LYMPHOCYTES # BLD AUTO: 1.3 K/UL (ref 1–4.8)
LYMPHOCYTES NFR BLD: 24.5 % (ref 18–48)
MAGNESIUM SERPL-MCNC: 1.7 MG/DL (ref 1.6–2.6)
MCH RBC QN AUTO: 29.9 PG (ref 27–31)
MCHC RBC AUTO-ENTMCNC: 31.7 G/DL (ref 32–36)
MCV RBC AUTO: 94 FL (ref 82–98)
MONOCYTES # BLD AUTO: 0.6 K/UL (ref 0.3–1)
MONOCYTES NFR BLD: 10.2 % (ref 4–15)
NEUTROPHILS # BLD AUTO: 3.1 K/UL (ref 1.8–7.7)
NEUTROPHILS NFR BLD: 57.3 % (ref 38–73)
NRBC BLD-RTO: 0 /100 WBC
PLATELET # BLD AUTO: 240 K/UL (ref 150–450)
PMV BLD AUTO: 11.1 FL (ref 9.2–12.9)
POTASSIUM SERPL-SCNC: 3.6 MMOL/L (ref 3.5–5.1)
PROT SERPL-MCNC: 6.1 G/DL (ref 6–8.4)
RBC # BLD AUTO: 3.01 M/UL (ref 4–5.4)
SODIUM SERPL-SCNC: 149 MMOL/L (ref 136–145)
VANCOMYCIN TROUGH SERPL-MCNC: 11.9 UG/ML (ref 10–22)
WBC # BLD AUTO: 5.39 K/UL (ref 3.9–12.7)

## 2021-07-05 PROCEDURE — 97530 THERAPEUTIC ACTIVITIES: CPT

## 2021-07-05 PROCEDURE — 25000003 PHARM REV CODE 250: Performed by: INTERNAL MEDICINE

## 2021-07-05 PROCEDURE — 80202 ASSAY OF VANCOMYCIN: CPT | Performed by: INTERNAL MEDICINE

## 2021-07-05 PROCEDURE — 11000001 HC ACUTE MED/SURG PRIVATE ROOM

## 2021-07-05 PROCEDURE — 36415 COLL VENOUS BLD VENIPUNCTURE: CPT | Performed by: INTERNAL MEDICINE

## 2021-07-05 PROCEDURE — 97116 GAIT TRAINING THERAPY: CPT

## 2021-07-05 PROCEDURE — 63600175 PHARM REV CODE 636 W HCPCS: Performed by: INTERNAL MEDICINE

## 2021-07-05 PROCEDURE — 83735 ASSAY OF MAGNESIUM: CPT | Performed by: INTERNAL MEDICINE

## 2021-07-05 PROCEDURE — 97110 THERAPEUTIC EXERCISES: CPT

## 2021-07-05 PROCEDURE — 80053 COMPREHEN METABOLIC PANEL: CPT | Performed by: INTERNAL MEDICINE

## 2021-07-05 PROCEDURE — 85025 COMPLETE CBC W/AUTO DIFF WBC: CPT | Performed by: INTERNAL MEDICINE

## 2021-07-05 PROCEDURE — 92526 ORAL FUNCTION THERAPY: CPT

## 2021-07-05 RX ORDER — NITROFURANTOIN 25; 75 MG/1; MG/1
100 CAPSULE ORAL EVERY 12 HOURS
Status: DISCONTINUED | OUTPATIENT
Start: 2021-07-05 | End: 2021-07-06 | Stop reason: HOSPADM

## 2021-07-05 RX ORDER — LOSARTAN POTASSIUM 50 MG/1
100 TABLET ORAL DAILY
Status: DISCONTINUED | OUTPATIENT
Start: 2021-07-05 | End: 2021-07-05

## 2021-07-05 RX ORDER — LOSARTAN POTASSIUM 50 MG/1
100 TABLET ORAL DAILY
Status: DISCONTINUED | OUTPATIENT
Start: 2021-07-06 | End: 2021-07-06 | Stop reason: HOSPADM

## 2021-07-05 RX ORDER — LOSARTAN POTASSIUM 50 MG/1
50 TABLET ORAL ONCE
Status: DISCONTINUED | OUTPATIENT
Start: 2021-07-05 | End: 2021-07-06 | Stop reason: HOSPADM

## 2021-07-05 RX ADMIN — HYDROXYCHLOROQUINE SULFATE 200 MG: 200 TABLET, FILM COATED ORAL at 08:07

## 2021-07-05 RX ADMIN — DOCUSATE SODIUM 100 MG: 100 CAPSULE, LIQUID FILLED ORAL at 09:07

## 2021-07-05 RX ADMIN — LIDOCAINE HYDROCHLORIDE 15 ML: 20 SOLUTION ORAL; TOPICAL at 09:07

## 2021-07-05 RX ADMIN — POTASSIUM CHLORIDE 20 MEQ: 1500 TABLET, EXTENDED RELEASE ORAL at 08:07

## 2021-07-05 RX ADMIN — VANCOMYCIN HYDROCHLORIDE 1250 MG: 1.25 INJECTION, POWDER, LYOPHILIZED, FOR SOLUTION INTRAVENOUS at 10:07

## 2021-07-05 RX ADMIN — NITROFURANTOIN (MONOHYDRATE/MACROCRYSTALS) 100 MG: 75; 25 CAPSULE ORAL at 11:07

## 2021-07-05 RX ADMIN — LEVOTHYROXINE SODIUM 88 MCG: 0.09 TABLET ORAL at 06:07

## 2021-07-05 RX ADMIN — PREDNISONE 2.5 MG: 2.5 TABLET ORAL at 08:07

## 2021-07-05 RX ADMIN — FAMOTIDINE 20 MG: 20 TABLET ORAL at 08:07

## 2021-07-05 RX ADMIN — NITROFURANTOIN (MONOHYDRATE/MACROCRYSTALS) 100 MG: 75; 25 CAPSULE ORAL at 09:07

## 2021-07-05 RX ADMIN — LOSARTAN POTASSIUM 50 MG: 50 TABLET ORAL at 08:07

## 2021-07-06 VITALS
TEMPERATURE: 98 F | HEART RATE: 68 BPM | BODY MASS INDEX: 19.75 KG/M2 | RESPIRATION RATE: 18 BRPM | DIASTOLIC BLOOD PRESSURE: 86 MMHG | OXYGEN SATURATION: 97 % | SYSTOLIC BLOOD PRESSURE: 202 MMHG | HEIGHT: 64 IN | WEIGHT: 115.69 LBS

## 2021-07-06 PROBLEM — Z79.899 DVT PROPHYLAXIS: Status: RESOLVED | Noted: 2021-05-26 | Resolved: 2021-07-06

## 2021-07-06 LAB
ALBUMIN SERPL BCP-MCNC: 3 G/DL (ref 3.5–5.2)
ALP SERPL-CCNC: 50 U/L (ref 55–135)
ALT SERPL W/O P-5'-P-CCNC: 16 U/L (ref 10–44)
ANION GAP SERPL CALC-SCNC: 5 MMOL/L (ref 8–16)
AST SERPL-CCNC: 15 U/L (ref 10–40)
BASOPHILS # BLD AUTO: 0.04 K/UL (ref 0–0.2)
BASOPHILS NFR BLD: 0.6 % (ref 0–1.9)
BILIRUB SERPL-MCNC: 0.4 MG/DL (ref 0.1–1)
BUN SERPL-MCNC: 9 MG/DL (ref 8–23)
CALCIUM SERPL-MCNC: 9.6 MG/DL (ref 8.7–10.5)
CHLORIDE SERPL-SCNC: 111 MMOL/L (ref 95–110)
CO2 SERPL-SCNC: 31 MMOL/L (ref 23–29)
CREAT SERPL-MCNC: 0.6 MG/DL (ref 0.5–1.4)
DIFFERENTIAL METHOD: ABNORMAL
EOSINOPHIL # BLD AUTO: 0.5 K/UL (ref 0–0.5)
EOSINOPHIL NFR BLD: 7.2 % (ref 0–8)
ERYTHROCYTE [DISTWIDTH] IN BLOOD BY AUTOMATED COUNT: 13.5 % (ref 11.5–14.5)
EST. GFR  (AFRICAN AMERICAN): >60 ML/MIN/1.73 M^2
EST. GFR  (NON AFRICAN AMERICAN): >60 ML/MIN/1.73 M^2
GLUCOSE SERPL-MCNC: 92 MG/DL (ref 70–110)
HCT VFR BLD AUTO: 29.2 % (ref 37–48.5)
HGB BLD-MCNC: 9.5 G/DL (ref 12–16)
IMM GRANULOCYTES # BLD AUTO: 0.02 K/UL (ref 0–0.04)
IMM GRANULOCYTES NFR BLD AUTO: 0.3 % (ref 0–0.5)
LYMPHOCYTES # BLD AUTO: 1.5 K/UL (ref 1–4.8)
LYMPHOCYTES NFR BLD: 22.8 % (ref 18–48)
MAGNESIUM SERPL-MCNC: 1.6 MG/DL (ref 1.6–2.6)
MCH RBC QN AUTO: 30.4 PG (ref 27–31)
MCHC RBC AUTO-ENTMCNC: 32.5 G/DL (ref 32–36)
MCV RBC AUTO: 93 FL (ref 82–98)
MONOCYTES # BLD AUTO: 0.7 K/UL (ref 0.3–1)
MONOCYTES NFR BLD: 10.4 % (ref 4–15)
NEUTROPHILS # BLD AUTO: 3.9 K/UL (ref 1.8–7.7)
NEUTROPHILS NFR BLD: 58.7 % (ref 38–73)
NRBC BLD-RTO: 0 /100 WBC
PLATELET # BLD AUTO: 293 K/UL (ref 150–450)
PMV BLD AUTO: 10.7 FL (ref 9.2–12.9)
POTASSIUM SERPL-SCNC: 4 MMOL/L (ref 3.5–5.1)
PROT SERPL-MCNC: 6.4 G/DL (ref 6–8.4)
RBC # BLD AUTO: 3.13 M/UL (ref 4–5.4)
SODIUM SERPL-SCNC: 147 MMOL/L (ref 136–145)
WBC # BLD AUTO: 6.66 K/UL (ref 3.9–12.7)

## 2021-07-06 PROCEDURE — 63600175 PHARM REV CODE 636 W HCPCS: Performed by: INTERNAL MEDICINE

## 2021-07-06 PROCEDURE — 36415 COLL VENOUS BLD VENIPUNCTURE: CPT | Performed by: INTERNAL MEDICINE

## 2021-07-06 PROCEDURE — 25000003 PHARM REV CODE 250: Performed by: INTERNAL MEDICINE

## 2021-07-06 PROCEDURE — 80053 COMPREHEN METABOLIC PANEL: CPT | Performed by: INTERNAL MEDICINE

## 2021-07-06 PROCEDURE — 85025 COMPLETE CBC W/AUTO DIFF WBC: CPT | Performed by: INTERNAL MEDICINE

## 2021-07-06 PROCEDURE — 83735 ASSAY OF MAGNESIUM: CPT | Performed by: INTERNAL MEDICINE

## 2021-07-06 RX ORDER — NITROFURANTOIN 25; 75 MG/1; MG/1
100 CAPSULE ORAL EVERY 12 HOURS
Qty: 20 CAPSULE | Refills: 0 | Status: SHIPPED | OUTPATIENT
Start: 2021-07-06 | End: 2022-03-29

## 2021-07-06 RX ADMIN — FAMOTIDINE 20 MG: 20 TABLET ORAL at 08:07

## 2021-07-06 RX ADMIN — LEVOTHYROXINE SODIUM 88 MCG: 0.09 TABLET ORAL at 05:07

## 2021-07-06 RX ADMIN — LOSARTAN POTASSIUM 100 MG: 50 TABLET, FILM COATED ORAL at 08:07

## 2021-07-06 RX ADMIN — NITROFURANTOIN (MONOHYDRATE/MACROCRYSTALS) 100 MG: 75; 25 CAPSULE ORAL at 08:07

## 2021-07-06 RX ADMIN — POTASSIUM CHLORIDE 20 MEQ: 1500 TABLET, EXTENDED RELEASE ORAL at 08:07

## 2021-07-06 RX ADMIN — PREDNISONE 2.5 MG: 2.5 TABLET ORAL at 08:07

## 2021-07-07 ENCOUNTER — PATIENT OUTREACH (OUTPATIENT)
Dept: ADMINISTRATIVE | Facility: CLINIC | Age: 86
End: 2021-07-07

## 2021-07-07 LAB
BACTERIA BLD CULT: NORMAL
BACTERIA BLD CULT: NORMAL

## 2021-08-11 ENCOUNTER — APPOINTMENT (OUTPATIENT)
Dept: LAB | Facility: HOSPITAL | Age: 86
End: 2021-08-11
Attending: INTERNAL MEDICINE
Payer: MEDICARE

## 2021-08-11 DIAGNOSIS — N39.0 URINARY TRACT INFECTION, SITE NOT SPECIFIED: Primary | ICD-10-CM

## 2021-08-11 LAB
BACTERIA #/AREA URNS HPF: NEGATIVE /HPF
BILIRUB UR QL STRIP: NEGATIVE
CLARITY UR: CLEAR
COLOR UR: YELLOW
GLUCOSE UR QL STRIP: NEGATIVE
HGB UR QL STRIP: NEGATIVE
HYALINE CASTS #/AREA URNS LPF: 2 /LPF
KETONES UR QL STRIP: NEGATIVE
LEUKOCYTE ESTERASE UR QL STRIP: ABNORMAL
MICROSCOPIC COMMENT: ABNORMAL
NITRITE UR QL STRIP: NEGATIVE
PH UR STRIP: 7 [PH] (ref 5–8)
PROT UR QL STRIP: ABNORMAL
RBC #/AREA URNS HPF: 4 /HPF (ref 0–4)
SP GR UR STRIP: 1.02 (ref 1–1.03)
SQUAMOUS #/AREA URNS HPF: 5 /HPF
URN SPEC COLLECT METH UR: ABNORMAL
UROBILINOGEN UR STRIP-ACNC: NEGATIVE EU/DL
WBC #/AREA URNS HPF: 4 /HPF (ref 0–5)

## 2021-08-11 PROCEDURE — 87086 URINE CULTURE/COLONY COUNT: CPT | Performed by: INTERNAL MEDICINE

## 2021-08-11 PROCEDURE — 81000 URINALYSIS NONAUTO W/SCOPE: CPT | Performed by: INTERNAL MEDICINE

## 2021-08-13 LAB
BACTERIA UR CULT: NORMAL
BACTERIA UR CULT: NORMAL

## 2021-11-12 ENCOUNTER — IMMUNIZATION (OUTPATIENT)
Dept: OBSTETRICS AND GYNECOLOGY | Facility: CLINIC | Age: 86
End: 2021-11-12
Payer: MEDICARE

## 2021-11-12 DIAGNOSIS — Z23 NEED FOR VACCINATION: Primary | ICD-10-CM

## 2021-11-12 PROCEDURE — 0003A COVID-19, MRNA, LNP-S, PF, 30 MCG/0.3 ML DOSE VACCINE: CPT | Mod: PBBFAC

## 2021-11-12 PROCEDURE — 91300 COVID-19, MRNA, LNP-S, PF, 30 MCG/0.3 ML DOSE VACCINE: CPT | Mod: PBBFAC

## 2021-11-23 ENCOUNTER — LAB VISIT (OUTPATIENT)
Dept: LAB | Facility: HOSPITAL | Age: 86
End: 2021-11-23
Attending: INTERNAL MEDICINE
Payer: MEDICARE

## 2021-11-23 DIAGNOSIS — E78.2 MIXED HYPERLIPIDEMIA: ICD-10-CM

## 2021-11-23 DIAGNOSIS — E03.9 MYXEDEMA HEART DISEASE: Primary | ICD-10-CM

## 2021-11-23 DIAGNOSIS — I10 ESSENTIAL HYPERTENSION, MALIGNANT: ICD-10-CM

## 2021-11-23 DIAGNOSIS — I51.9 MYXEDEMA HEART DISEASE: Primary | ICD-10-CM

## 2021-11-23 LAB
ALBUMIN SERPL BCP-MCNC: 3.5 G/DL (ref 3.5–5.2)
ALP SERPL-CCNC: 42 U/L (ref 55–135)
ALT SERPL W/O P-5'-P-CCNC: 20 U/L (ref 10–44)
ANION GAP SERPL CALC-SCNC: 4 MMOL/L (ref 8–16)
AST SERPL-CCNC: 22 U/L (ref 10–40)
BASOPHILS # BLD AUTO: 0.05 K/UL (ref 0–0.2)
BASOPHILS NFR BLD: 0.9 % (ref 0–1.9)
BILIRUB SERPL-MCNC: 0.5 MG/DL (ref 0.1–1)
BUN SERPL-MCNC: 24 MG/DL (ref 8–23)
CALCIUM SERPL-MCNC: 10 MG/DL (ref 8.7–10.5)
CHLORIDE SERPL-SCNC: 107 MMOL/L (ref 95–110)
CHOLEST SERPL-MCNC: 189 MG/DL (ref 120–199)
CHOLEST/HDLC SERPL: 2.5 {RATIO} (ref 2–5)
CO2 SERPL-SCNC: 32 MMOL/L (ref 23–29)
CREAT SERPL-MCNC: 1 MG/DL (ref 0.5–1.4)
DIFFERENTIAL METHOD: ABNORMAL
EOSINOPHIL # BLD AUTO: 0.2 K/UL (ref 0–0.5)
EOSINOPHIL NFR BLD: 3.2 % (ref 0–8)
ERYTHROCYTE [DISTWIDTH] IN BLOOD BY AUTOMATED COUNT: 12.2 % (ref 11.5–14.5)
EST. GFR  (AFRICAN AMERICAN): 59.8 ML/MIN/1.73 M^2
EST. GFR  (NON AFRICAN AMERICAN): 51.9 ML/MIN/1.73 M^2
GLUCOSE SERPL-MCNC: 88 MG/DL (ref 70–110)
HCT VFR BLD AUTO: 34 % (ref 37–48.5)
HDLC SERPL-MCNC: 77 MG/DL (ref 40–75)
HDLC SERPL: 40.7 % (ref 20–50)
HGB BLD-MCNC: 11 G/DL (ref 12–16)
IMM GRANULOCYTES # BLD AUTO: 0.02 K/UL (ref 0–0.04)
IMM GRANULOCYTES NFR BLD AUTO: 0.4 % (ref 0–0.5)
LDLC SERPL CALC-MCNC: 89.4 MG/DL (ref 63–159)
LYMPHOCYTES # BLD AUTO: 1.8 K/UL (ref 1–4.8)
LYMPHOCYTES NFR BLD: 34.3 % (ref 18–48)
MCH RBC QN AUTO: 31.6 PG (ref 27–31)
MCHC RBC AUTO-ENTMCNC: 32.4 G/DL (ref 32–36)
MCV RBC AUTO: 98 FL (ref 82–98)
MONOCYTES # BLD AUTO: 0.6 K/UL (ref 0.3–1)
MONOCYTES NFR BLD: 10.3 % (ref 4–15)
NEUTROPHILS # BLD AUTO: 2.7 K/UL (ref 1.8–7.7)
NEUTROPHILS NFR BLD: 50.9 % (ref 38–73)
NONHDLC SERPL-MCNC: 112 MG/DL
NRBC BLD-RTO: 0 /100 WBC
PLATELET # BLD AUTO: 254 K/UL (ref 150–450)
PMV BLD AUTO: 10.7 FL (ref 9.2–12.9)
POTASSIUM SERPL-SCNC: 4.6 MMOL/L (ref 3.5–5.1)
PROT SERPL-MCNC: 6.9 G/DL (ref 6–8.4)
RBC # BLD AUTO: 3.48 M/UL (ref 4–5.4)
SODIUM SERPL-SCNC: 143 MMOL/L (ref 136–145)
TRIGL SERPL-MCNC: 113 MG/DL (ref 30–150)
TSH SERPL DL<=0.005 MIU/L-ACNC: 2.9 UIU/ML (ref 0.4–4)
WBC # BLD AUTO: 5.34 K/UL (ref 3.9–12.7)

## 2021-11-23 PROCEDURE — 80061 LIPID PANEL: CPT | Performed by: INTERNAL MEDICINE

## 2021-11-23 PROCEDURE — 84443 ASSAY THYROID STIM HORMONE: CPT | Performed by: INTERNAL MEDICINE

## 2021-11-23 PROCEDURE — 85025 COMPLETE CBC W/AUTO DIFF WBC: CPT | Performed by: INTERNAL MEDICINE

## 2021-11-23 PROCEDURE — 36415 COLL VENOUS BLD VENIPUNCTURE: CPT | Performed by: INTERNAL MEDICINE

## 2021-11-23 PROCEDURE — 80053 COMPREHEN METABOLIC PANEL: CPT | Performed by: INTERNAL MEDICINE
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
## 2022-03-29 PROBLEM — E78.2 HYPERLIPIDEMIA, MIXED: Status: ACTIVE | Noted: 2022-03-29

## 2022-03-29 PROBLEM — R53.1 WEAKNESS: Status: RESOLVED | Noted: 2021-05-17 | Resolved: 2022-03-29

## 2022-03-29 PROBLEM — R42 DIZZINESS: Status: ACTIVE | Noted: 2022-03-29

## 2022-03-29 PROBLEM — K13.79 MOUTH PAIN: Status: RESOLVED | Noted: 2021-05-26 | Resolved: 2022-03-29

## 2022-03-29 PROBLEM — R53.81 DEBILITY: Status: RESOLVED | Noted: 2021-05-24 | Resolved: 2022-03-29

## 2022-03-29 PROBLEM — R41.82 ALTERED MENTAL STATUS: Status: RESOLVED | Noted: 2021-07-01 | Resolved: 2022-03-29

## 2022-03-29 PROBLEM — N12 PYELONEPHRITIS: Status: RESOLVED | Noted: 2021-05-17 | Resolved: 2022-03-29

## 2022-03-29 PROBLEM — E78.49 OTHER HYPERLIPIDEMIA: Status: ACTIVE | Noted: 2022-03-29

## 2022-03-29 PROBLEM — E86.0 DEHYDRATION: Status: RESOLVED | Noted: 2021-05-17 | Resolved: 2022-03-29

## 2022-03-29 PROBLEM — R32 INCONTINENCE: Status: ACTIVE | Noted: 2022-03-29

## 2022-03-29 PROBLEM — R05.9 COUGH: Status: RESOLVED | Noted: 2021-05-20 | Resolved: 2022-03-29

## 2022-03-29 PROBLEM — D64.9 ANEMIA: Status: RESOLVED | Noted: 2021-07-02 | Resolved: 2022-03-29

## 2022-03-29 PROBLEM — E87.6 HYPOKALEMIA: Status: RESOLVED | Noted: 2021-05-20 | Resolved: 2022-03-29

## 2022-03-29 PROBLEM — R79.89 ELEVATED TROPONIN: Status: RESOLVED | Noted: 2021-05-17 | Resolved: 2022-03-29

## 2022-03-29 PROBLEM — N30.00 ACUTE CYSTITIS WITHOUT HEMATURIA: Status: RESOLVED | Noted: 2021-07-02 | Resolved: 2022-03-29

## 2022-03-29 PROBLEM — Z00.00 WELLNESS EXAMINATION: Status: ACTIVE | Noted: 2022-03-29

## 2022-05-02 PROBLEM — R63.4 WEIGHT LOSS: Status: ACTIVE | Noted: 2022-05-02

## 2022-05-08 PROBLEM — B96.89 BV (BACTERIAL VAGINOSIS): Status: ACTIVE | Noted: 2022-05-08

## 2022-05-08 PROBLEM — N76.0 BV (BACTERIAL VAGINOSIS): Status: ACTIVE | Noted: 2022-05-08

## 2022-06-06 ENCOUNTER — APPOINTMENT (OUTPATIENT)
Dept: LAB | Facility: HOSPITAL | Age: 87
End: 2022-06-06
Attending: INTERNAL MEDICINE
Payer: MEDICARE

## 2022-06-06 DIAGNOSIS — N39.0 URINARY TRACT INFECTION, SITE NOT SPECIFIED: Primary | ICD-10-CM

## 2022-06-06 LAB
BILIRUB UR QL STRIP: NEGATIVE
CLARITY UR: CLEAR
COLOR UR: YELLOW
GLUCOSE UR QL STRIP: NEGATIVE
HGB UR QL STRIP: NEGATIVE
KETONES UR QL STRIP: NEGATIVE
LEUKOCYTE ESTERASE UR QL STRIP: NEGATIVE
NITRITE UR QL STRIP: NEGATIVE
PH UR STRIP: 7 [PH] (ref 5–8)
PROT UR QL STRIP: NEGATIVE
SP GR UR STRIP: 1.01 (ref 1–1.03)
URN SPEC COLLECT METH UR: NORMAL
UROBILINOGEN UR STRIP-ACNC: NEGATIVE EU/DL

## 2022-06-06 PROCEDURE — 81003 URINALYSIS AUTO W/O SCOPE: CPT | Performed by: INTERNAL MEDICINE

## 2022-06-07 ENCOUNTER — LAB VISIT (OUTPATIENT)
Dept: LAB | Facility: HOSPITAL | Age: 87
End: 2022-06-07
Attending: INTERNAL MEDICINE
Payer: MEDICARE

## 2022-06-07 DIAGNOSIS — Z13.228 SCREENING FOR PHENYLKETONURIA (PKU): ICD-10-CM

## 2022-06-07 DIAGNOSIS — R79.9 ABNORMAL BLOOD CHEMISTRY: Primary | ICD-10-CM

## 2022-06-07 LAB
ALBUMIN SERPL BCP-MCNC: 4 G/DL (ref 3.5–5.2)
ALP SERPL-CCNC: 35 U/L (ref 55–135)
ALT SERPL W/O P-5'-P-CCNC: 20 U/L (ref 10–44)
ANION GAP SERPL CALC-SCNC: 6 MMOL/L (ref 8–16)
AST SERPL-CCNC: 20 U/L (ref 10–40)
BASOPHILS # BLD AUTO: 0.03 K/UL (ref 0–0.2)
BASOPHILS NFR BLD: 0.4 % (ref 0–1.9)
BILIRUB SERPL-MCNC: 0.4 MG/DL (ref 0.1–1)
BUN SERPL-MCNC: 30 MG/DL (ref 8–23)
CALCIUM SERPL-MCNC: 9.6 MG/DL (ref 8.7–10.5)
CHLORIDE SERPL-SCNC: 111 MMOL/L (ref 95–110)
CO2 SERPL-SCNC: 26 MMOL/L (ref 23–29)
CREAT SERPL-MCNC: 1.3 MG/DL (ref 0.5–1.4)
DIFFERENTIAL METHOD: ABNORMAL
EOSINOPHIL # BLD AUTO: 0.1 K/UL (ref 0–0.5)
EOSINOPHIL NFR BLD: 0.7 % (ref 0–8)
ERYTHROCYTE [DISTWIDTH] IN BLOOD BY AUTOMATED COUNT: 13.2 % (ref 11.5–14.5)
EST. GFR  (AFRICAN AMERICAN): 43.5 ML/MIN/1.73 M^2
EST. GFR  (NON AFRICAN AMERICAN): 37.8 ML/MIN/1.73 M^2
GLUCOSE SERPL-MCNC: 131 MG/DL (ref 70–110)
HCT VFR BLD AUTO: 34.2 % (ref 37–48.5)
HGB BLD-MCNC: 11.3 G/DL (ref 12–16)
IMM GRANULOCYTES # BLD AUTO: 0.02 K/UL (ref 0–0.04)
IMM GRANULOCYTES NFR BLD AUTO: 0.3 % (ref 0–0.5)
LYMPHOCYTES # BLD AUTO: 1.1 K/UL (ref 1–4.8)
LYMPHOCYTES NFR BLD: 15.4 % (ref 18–48)
MCH RBC QN AUTO: 32.1 PG (ref 27–31)
MCHC RBC AUTO-ENTMCNC: 33 G/DL (ref 32–36)
MCV RBC AUTO: 97 FL (ref 82–98)
MONOCYTES # BLD AUTO: 0.4 K/UL (ref 0.3–1)
MONOCYTES NFR BLD: 5.8 % (ref 4–15)
NEUTROPHILS # BLD AUTO: 5.7 K/UL (ref 1.8–7.7)
NEUTROPHILS NFR BLD: 77.4 % (ref 38–73)
NRBC BLD-RTO: 0 /100 WBC
PLATELET # BLD AUTO: 258 K/UL (ref 150–450)
PMV BLD AUTO: 10.7 FL (ref 9.2–12.9)
POTASSIUM SERPL-SCNC: 4.3 MMOL/L (ref 3.5–5.1)
PROT SERPL-MCNC: 7.3 G/DL (ref 6–8.4)
RBC # BLD AUTO: 3.52 M/UL (ref 4–5.4)
SODIUM SERPL-SCNC: 143 MMOL/L (ref 136–145)
WBC # BLD AUTO: 7.4 K/UL (ref 3.9–12.7)

## 2022-06-07 PROCEDURE — 80053 COMPREHEN METABOLIC PANEL: CPT | Performed by: INTERNAL MEDICINE

## 2022-06-07 PROCEDURE — 36415 COLL VENOUS BLD VENIPUNCTURE: CPT | Performed by: INTERNAL MEDICINE

## 2022-06-07 PROCEDURE — 85025 COMPLETE CBC W/AUTO DIFF WBC: CPT | Performed by: INTERNAL MEDICINE

## 2022-07-04 PROBLEM — Z00.00 WELLNESS EXAMINATION: Status: RESOLVED | Noted: 2022-03-29 | Resolved: 2022-07-04

## 2022-07-22 ENCOUNTER — IMMUNIZATION (OUTPATIENT)
Dept: PRIMARY CARE CLINIC | Facility: CLINIC | Age: 87
End: 2022-07-22
Payer: MEDICARE

## 2022-07-22 DIAGNOSIS — Z23 NEED FOR VACCINATION: Primary | ICD-10-CM

## 2022-07-22 PROCEDURE — 91305 COVID-19, MRNA, LNP-S, PF, 30 MCG/0.3 ML DOSE VACCINE (PFIZER): CPT | Mod: PBBFAC,PN

## 2022-07-28 ENCOUNTER — LAB VISIT (OUTPATIENT)
Dept: LAB | Facility: HOSPITAL | Age: 87
End: 2022-07-28
Attending: INTERNAL MEDICINE
Payer: MEDICARE

## 2022-07-28 DIAGNOSIS — U07.1 COVID-19 VIRUS DETECTED: ICD-10-CM

## 2022-07-28 DIAGNOSIS — U07.1 CLINICAL DIAGNOSIS OF SEVERE ACUTE RESPIRATORY SYNDROME CORONAVIRUS 2 (SARS-COV-2) DISEASE: Primary | ICD-10-CM

## 2022-07-28 LAB — SARS-COV-2 RNA RESP QL NAA+PROBE: DETECTED

## 2022-07-28 PROCEDURE — U0002 COVID-19 LAB TEST NON-CDC: HCPCS | Performed by: INTERNAL MEDICINE

## 2022-08-01 ENCOUNTER — LAB VISIT (OUTPATIENT)
Dept: LAB | Facility: HOSPITAL | Age: 87
End: 2022-08-01
Attending: INTERNAL MEDICINE
Payer: MEDICARE

## 2022-08-01 DIAGNOSIS — E78.5 HYPERLIPEMIA: ICD-10-CM

## 2022-08-01 DIAGNOSIS — I51.9 MYXEDEMA HEART DISEASE: ICD-10-CM

## 2022-08-01 DIAGNOSIS — D64.9 ANEMIA, UNSPECIFIED: Primary | ICD-10-CM

## 2022-08-01 DIAGNOSIS — E03.9 MYXEDEMA HEART DISEASE: ICD-10-CM

## 2022-08-01 LAB
BASOPHILS # BLD AUTO: 0.03 K/UL (ref 0–0.2)
BASOPHILS NFR BLD: 1 % (ref 0–1.9)
CHOLEST SERPL-MCNC: 138 MG/DL (ref 120–199)
CHOLEST/HDLC SERPL: 1.8 {RATIO} (ref 2–5)
DIFFERENTIAL METHOD: ABNORMAL
EOSINOPHIL # BLD AUTO: 0.1 K/UL (ref 0–0.5)
EOSINOPHIL NFR BLD: 3.5 % (ref 0–8)
ERYTHROCYTE [DISTWIDTH] IN BLOOD BY AUTOMATED COUNT: 12.3 % (ref 11.5–14.5)
HCT VFR BLD AUTO: 32.6 % (ref 37–48.5)
HDLC SERPL-MCNC: 77 MG/DL (ref 40–75)
HDLC SERPL: 55.8 % (ref 20–50)
HGB BLD-MCNC: 10.6 G/DL (ref 12–16)
IMM GRANULOCYTES # BLD AUTO: 0.01 K/UL (ref 0–0.04)
IMM GRANULOCYTES NFR BLD AUTO: 0.3 % (ref 0–0.5)
LDLC SERPL CALC-MCNC: 47 MG/DL (ref 63–159)
LYMPHOCYTES # BLD AUTO: 1.4 K/UL (ref 1–4.8)
LYMPHOCYTES NFR BLD: 44.5 % (ref 18–48)
MCH RBC QN AUTO: 31.9 PG (ref 27–31)
MCHC RBC AUTO-ENTMCNC: 32.5 G/DL (ref 32–36)
MCV RBC AUTO: 98 FL (ref 82–98)
MONOCYTES # BLD AUTO: 0.5 K/UL (ref 0.3–1)
MONOCYTES NFR BLD: 14.5 % (ref 4–15)
NEUTROPHILS # BLD AUTO: 1.1 K/UL (ref 1.8–7.7)
NEUTROPHILS NFR BLD: 36.2 % (ref 38–73)
NONHDLC SERPL-MCNC: 61 MG/DL
NRBC BLD-RTO: 0 /100 WBC
PLATELET # BLD AUTO: 211 K/UL (ref 150–450)
PMV BLD AUTO: 10.5 FL (ref 9.2–12.9)
RBC # BLD AUTO: 3.32 M/UL (ref 4–5.4)
TRIGL SERPL-MCNC: 70 MG/DL (ref 30–150)
TSH SERPL DL<=0.005 MIU/L-ACNC: 3.41 UIU/ML (ref 0.4–4)
WBC # BLD AUTO: 3.1 K/UL (ref 3.9–12.7)

## 2022-08-01 PROCEDURE — 80061 LIPID PANEL: CPT | Performed by: INTERNAL MEDICINE

## 2022-08-01 PROCEDURE — 85025 COMPLETE CBC W/AUTO DIFF WBC: CPT | Performed by: INTERNAL MEDICINE

## 2022-08-01 PROCEDURE — 36415 COLL VENOUS BLD VENIPUNCTURE: CPT | Performed by: INTERNAL MEDICINE

## 2022-08-01 PROCEDURE — 84443 ASSAY THYROID STIM HORMONE: CPT | Performed by: INTERNAL MEDICINE

## 2022-09-26 ENCOUNTER — APPOINTMENT (OUTPATIENT)
Dept: LAB | Facility: HOSPITAL | Age: 87
End: 2022-09-26
Attending: INTERNAL MEDICINE
Payer: MEDICARE

## 2022-09-26 DIAGNOSIS — N39.0 URINARY TRACT INFECTION, SITE NOT SPECIFIED: Primary | ICD-10-CM

## 2022-09-26 LAB
BILIRUB UR QL STRIP: NEGATIVE
CLARITY UR: CLEAR
COLOR UR: YELLOW
GLUCOSE UR QL STRIP: NEGATIVE
HGB UR QL STRIP: NEGATIVE
KETONES UR QL STRIP: NEGATIVE
LEUKOCYTE ESTERASE UR QL STRIP: NEGATIVE
NITRITE UR QL STRIP: NEGATIVE
PH UR STRIP: 8 [PH] (ref 5–8)
PROT UR QL STRIP: NEGATIVE
SP GR UR STRIP: 1.01 (ref 1–1.03)
URN SPEC COLLECT METH UR: NORMAL
UROBILINOGEN UR STRIP-ACNC: NEGATIVE EU/DL

## 2022-09-26 PROCEDURE — 87086 URINE CULTURE/COLONY COUNT: CPT | Performed by: INTERNAL MEDICINE

## 2022-09-26 PROCEDURE — 81003 URINALYSIS AUTO W/O SCOPE: CPT | Performed by: INTERNAL MEDICINE

## 2022-09-27 LAB
BACTERIA UR CULT: NORMAL
BACTERIA UR CULT: NORMAL

## 2022-11-07 PROBLEM — R53.1 GENERALIZED WEAKNESS: Status: ACTIVE | Noted: 2021-05-24

## 2022-11-09 ENCOUNTER — HOSPITAL ENCOUNTER (INPATIENT)
Facility: HOSPITAL | Age: 87
LOS: 8 days | Discharge: HOME HEALTH | DRG: 947 | End: 2022-11-17
Attending: INTERNAL MEDICINE | Admitting: INTERNAL MEDICINE
Payer: MEDICARE

## 2022-11-09 DIAGNOSIS — R53.81 DEBILITY: ICD-10-CM

## 2022-11-09 DIAGNOSIS — R53.1 GENERALIZED WEAKNESS: Primary | ICD-10-CM

## 2022-11-09 PROCEDURE — 92523 SPEECH SOUND LANG COMPREHEN: CPT

## 2022-11-09 PROCEDURE — 11800000 HC REHAB PRIVATE ROOM

## 2022-11-09 PROCEDURE — 25000003 PHARM REV CODE 250: Performed by: INTERNAL MEDICINE

## 2022-11-09 PROCEDURE — 63600175 PHARM REV CODE 636 W HCPCS: Performed by: INTERNAL MEDICINE

## 2022-11-09 PROCEDURE — 97162 PT EVAL MOD COMPLEX 30 MIN: CPT

## 2022-11-09 PROCEDURE — 92610 EVALUATE SWALLOWING FUNCTION: CPT

## 2022-11-09 PROCEDURE — 97166 OT EVAL MOD COMPLEX 45 MIN: CPT

## 2022-11-09 RX ORDER — GUAIFENESIN/DEXTROMETHORPHAN 100-10MG/5
10 SYRUP ORAL EVERY 4 HOURS PRN
Status: DISCONTINUED | OUTPATIENT
Start: 2022-11-09 | End: 2022-11-17 | Stop reason: HOSPADM

## 2022-11-09 RX ORDER — SODIUM CHLORIDE 0.9 % (FLUSH) 0.9 %
10 SYRINGE (ML) INJECTION
Status: DISCONTINUED | OUTPATIENT
Start: 2022-11-09 | End: 2022-11-17 | Stop reason: HOSPADM

## 2022-11-09 RX ORDER — PREDNISONE 1 MG/1
1 TABLET ORAL 2 TIMES DAILY
Status: DISCONTINUED | OUTPATIENT
Start: 2022-11-09 | End: 2022-11-10

## 2022-11-09 RX ORDER — FAMOTIDINE 20 MG/1
20 TABLET, FILM COATED ORAL DAILY
Status: DISCONTINUED | OUTPATIENT
Start: 2022-11-10 | End: 2022-11-17 | Stop reason: HOSPADM

## 2022-11-09 RX ORDER — ATORVASTATIN CALCIUM 40 MG/1
40 TABLET, FILM COATED ORAL DAILY
Status: DISCONTINUED | OUTPATIENT
Start: 2022-11-10 | End: 2022-11-17 | Stop reason: HOSPADM

## 2022-11-09 RX ORDER — DOCUSATE SODIUM 100 MG/1
100 CAPSULE, LIQUID FILLED ORAL NIGHTLY
Status: DISCONTINUED | OUTPATIENT
Start: 2022-11-09 | End: 2022-11-17 | Stop reason: HOSPADM

## 2022-11-09 RX ORDER — LEVOTHYROXINE SODIUM 88 UG/1
88 TABLET ORAL
Status: DISCONTINUED | OUTPATIENT
Start: 2022-11-10 | End: 2022-11-17 | Stop reason: HOSPADM

## 2022-11-09 RX ORDER — ACETAMINOPHEN 325 MG/1
650 TABLET ORAL EVERY 8 HOURS PRN
Status: DISCONTINUED | OUTPATIENT
Start: 2022-11-09 | End: 2022-11-17 | Stop reason: HOSPADM

## 2022-11-09 RX ORDER — HYDROXYCHLOROQUINE SULFATE 200 MG/1
200 TABLET, FILM COATED ORAL EVERY OTHER DAY
Status: DISCONTINUED | OUTPATIENT
Start: 2022-11-10 | End: 2022-11-17 | Stop reason: HOSPADM

## 2022-11-09 RX ORDER — TALC
6 POWDER (GRAM) TOPICAL NIGHTLY PRN
Status: DISCONTINUED | OUTPATIENT
Start: 2022-11-09 | End: 2022-11-17 | Stop reason: HOSPADM

## 2022-11-09 RX ORDER — LOSARTAN POTASSIUM 50 MG/1
50 TABLET ORAL DAILY
Status: DISCONTINUED | OUTPATIENT
Start: 2022-11-10 | End: 2022-11-12

## 2022-11-09 RX ORDER — ONDANSETRON 2 MG/ML
4 INJECTION INTRAMUSCULAR; INTRAVENOUS EVERY 8 HOURS PRN
Status: DISCONTINUED | OUTPATIENT
Start: 2022-11-09 | End: 2022-11-17 | Stop reason: HOSPADM

## 2022-11-09 RX ORDER — PRAMIPEXOLE DIHYDROCHLORIDE 0.12 MG/1
0.25 TABLET ORAL NIGHTLY
Status: DISCONTINUED | OUTPATIENT
Start: 2022-11-09 | End: 2022-11-17 | Stop reason: HOSPADM

## 2022-11-09 RX ADMIN — DOCUSATE SODIUM 100 MG: 100 CAPSULE, LIQUID FILLED ORAL at 08:11

## 2022-11-09 RX ADMIN — CEFTRIAXONE 1 G: 1 INJECTION, SOLUTION INTRAVENOUS at 04:11

## 2022-11-09 RX ADMIN — ACETAMINOPHEN 650 MG: 325 TABLET ORAL at 08:11

## 2022-11-09 RX ADMIN — PRAMIPEXOLE DIHYDROCHLORIDE 0.25 MG: 0.12 TABLET ORAL at 08:11

## 2022-11-09 RX ADMIN — GUAIFENESIN AND DEXTROMETHORPHAN 10 ML: 100; 10 SYRUP ORAL at 08:11

## 2022-11-09 RX ADMIN — Medication 6 MG: at 08:11

## 2022-11-09 NOTE — PT/OT/SLP EVAL
PhysicalTherapy   Evaluation    Katarzyna Warner   MRN: [id number]                           Billable Minutes:  Evaluation 60  Total Minutes: 60     Start time 1420  Stop time: 1520    Diagnosis: <principal problem not specified>  Past Medical History:   Diagnosis Date    Acute cystitis without hematuria 2021    Altered mental status 2021    Anemia 2021    Anxiety     Arthritis     Arthritis, rheumatoid     Bell's palsy     Bursitis of left shoulder     Carpal tunnel syndrome, bilateral     Debility 2021    Elevated troponin 2021    Erosive osteoarthritis of both hands     Esophagus disorder     needs to have her esophagus stretched again    Fall 2021    Function kidney decreased     GERD (gastroesophageal reflux disease)     High cholesterol     Hypertension     Hypokalemia 2021    Hypothyroidism     Kyphosis of cervical region, unspecified kyphosis type     Lumbar pain     Migraine headache     Postmenopausal disorder     Shingles     Thrombocytopenia     Thyroid disease     Unspecified cataract     Weakness 2021      Past Surgical History:   Procedure Laterality Date    BACK SURGERY      BLADDER SURGERY  2006    Dr Robert in [place] stem cell implant to bladder    CATARACT EXTRACTION Left     Dr Barroso 2016     SECTION      COLONOSCOPY  10/2012    Dr Corbett    ESOPHAGOGASTRODUODENOSCOPY  2021    priscakis ring - dilated, gastroduodenitis, bile reflux - pickard    HYSTERECTOMY      NOSE SURGERY  2021    Dr De Souza in [place] removal of skin cancer    OOPHORECTOMY      TONSILLECTOMY         Referring physician: Ning  Date referred to PT: 22    General Precautions: Standard,  fall             Patient History:       DME owned (not currently used): rolling walker, single point cane, bedside commode, and shower chair    Previous Level of Function:   Pt lives alone in a single story home with 2 steps and B handrails to enter; rails are a little  "wide. Pt was driving in town to/from store/appointments. Modified independent with ADL's; pt states she ambulates in home mostly without assist device and in community will use her rollator. Pt enjoys outdoor activities, pressure washes "Everything" and likes quilting    Subjective:  Communicated with nurse prior to session.    Pain: back 4/10  Chief Complaint: weakness/back pain  Patient goals: improve strength and mobility to return home  Family goals: improve safety for pt to return home         Objective:  Patient found sitting up in dining area with her dtr      Cognitive Exam:  Oriented to: Person, Place, Time, and Situation  Follows Commands/attention: Follows multistep  commands  Communication: clear/fluent  Safety awareness/insight to disability: impaired - she admits to being a risk taker    Physical Exam:  Postural examination/scapula alignment:    -       Rounded shoulders  -       Forward head  -       Kyphosis    Skin integrity: Visible skin intact  Edema: None noted      Sensation:      -       Intact B LE's to light touch        Lower Extremity Range of Motion:  Right Lower Extremity: WFL  Left Lower Extremity: WFL    Lower Extremity Strength:  Right Lower Extremity:  3+/5 hip ext and flex; hip add/abd 4-/5; quads/hams 4-/5; ankle DF 4-/5  Left Lower Extremity: 3+/5 hip ext and flex; hip add/abd 4-/5; quads/hams 4-/5; ankle DF 4-/5     Fine motor coordination:     -       Intact    Gross motor coordination: WFL    Functional Mobility:    Bed Mobility :   Supine to sit: Minimal Assistance   Sit to supine: Minimal Assistance   Rolling: Standby Assistance   Scooting: Standby Assistance    Transfers:  Sit to stand:Minimal Assistance with Rolling Walker    Bed <> Chair:  Stand Pivot and Step Transfer with Contact Guard Assistance with Rolling Walker    Toilet Transfer:  Pt Stand Pivot and Step Transfer with Contact Guard Assistance and Minimal Assistance with Rolling Walker and Grab bars  "     Wheelchair:  NA    Gait:  Pt ambulated 100' x 2 trials with contact guard assist, slower step thru pattern, very kyphotic posture    Stairs:  Pt ascended/descended 4 steps using B handrails step to pattern min assist     Balance:   Static Sit: GOOD-: Takes MODERATE challenges from all directions but inconsistently  Dynamic Sit:  FAIR+: Maintains balance through MINIMAL excursions of active trunk motion  Static Stand: FAIR: Maintains without assist but unable to take challenges  Dynamic stand: FAIR: Needs CONTACT GUARD during gait    Endurance deficit:  Fatigues with ambulation; requires seated rest breaks        Patient left HOB elevated with call button in reach and nurse present.    Assessment:  Katarzyna Warner is a 87 y.o. female with a medical diagnosis of <principal problem not specified>. She presents with decreased prox strength, decreased activity nayana, decreased balance in standing/gait with increased fall risk. Will benefit from PT to address strengthening, balance reactions and improve safety with transfers and gait.    Rehab potential is excellent.    Activity tolerance: Good    Plan: plan care intiated, gait training, balance training, and strengthening    Discharge recommendations:       Equipment recommendations:      GOALS:   Multidisciplinary Problems       Physical Therapy Goals          Problem: Physical Therapy    Goal Priority Disciplines Outcome Goal Variances Interventions   Physical Therapy Goal     PT, PT/OT      Description: Short term goals to be made 22    Patient will increase functional independence with mobility by performin. Supine to sit with SBA  2. Sit to supine with SBA  3. Sit to stand transfer with Stand-by Assistance  4. Bed to chair transfer with Supervision using Rolling Walker  5. Gait  x 150 feet with Supervision using Rolling Walker.   6. Ascend/descend 8 stair with bilateral Handrails supervision    Long term goals to be met by 22 .     1. Supine to  sit with modified independent  2. Sit to supine with modified independent   3. Sit to stand transfer with modified independence  4. Bed to chair transfer with modified independence using Rolling Walker  5. Gait  x 150 feet with modified independence using Rolling Walker.   6. Ascend/descend 12 stair with bilateral Handrails modified independence                         PLAN:    Patient to be seen 90 minutes per day/5 days per week   to address the above listed problems via  strengthening, balance training, bed mobility, transfer and gait training including steps  Plan of Care expires:  11/23/22  Plan of Care reviewed with:  patient and dtr          11/9/2022

## 2022-11-09 NOTE — NURSING
Admit skin assessment note: Patient has bruise to left shin and bruising to bilateral arms with a large bruise to left hand. Redness to vaginal area and coccyx/sacrum area that is blanchable.

## 2022-11-09 NOTE — NURSING
Patient transferred to inpatient rehab unit via wheelchair. Report was called to YAEL Mitchell.

## 2022-11-09 NOTE — PT/OT/SLP EVAL
Speech Language Pathology  Evaluation    Katarzyna Warner   MRN: [id number]   Admitting Diagnosis: <principal problem not specified>    Diet recommendations: Solid Diet Level: Regular Diet - IDDSI Level 7, Other (Comment) (gravy with meat)  Liquid Diet Level: Thin liquids - IDDSI Level 0 Alternating bites/sips, HOB to 90 degrees, Meds whole 1 at a time, Small bites/sips, and Standard aspiration precautions; gravy with meat          Billable Minutes:  Eval x60     Diagnosis: <principal problem not specified>    Past Medical History:   Diagnosis Date    Acute cystitis without hematuria 2021    Altered mental status 2021    Anemia 2021    Anxiety     Arthritis     Arthritis, rheumatoid     Bell's palsy     Bursitis of left shoulder     Carpal tunnel syndrome, bilateral     Debility 2021    Elevated troponin 2021    Erosive osteoarthritis of both hands     Esophagus disorder     needs to have her esophagus stretched again    Fall 2021    Function kidney decreased     GERD (gastroesophageal reflux disease)     High cholesterol     Hypertension     Hypokalemia 2021    Hypothyroidism     Kyphosis of cervical region, unspecified kyphosis type     Lumbar pain     Migraine headache     Postmenopausal disorder     Shingles     Thrombocytopenia     Thyroid disease     Unspecified cataract     Weakness 2021     Past Surgical History:   Procedure Laterality Date    BACK SURGERY      BLADDER SURGERY  2006    Dr Robert in [place] stem cell implant to bladder    CATARACT EXTRACTION Left     Dr Barroso 2016     SECTION      COLONOSCOPY  10/2012    Dr Corbett    ESOPHAGOGASTRODUODENOSCOPY  2021    priscakis ring - dilated, gastroduodenitis, bile reflux - pickard    HYSTERECTOMY      NOSE SURGERY  2021    Dr De Souza in [place] removal of skin cancer    OOPHORECTOMY      TONSILLECTOMY                General Precautions: Standard,            SUBJECTIVE:  General patient  "information:    Primary Language: English    Primary Communication used : Verbal    Prior Level of Functioning:  Functional: communication, comprehension, expression, cognition, memory, attention, and swallowing   Impaired:  voice/speech intelligibility; pt and dtr report voice has become weak/hoarse over time with low intensity with reports of her "mumbling" often having to ask her to repeat herself       Premorbid Status: Driving, Retired, Yard Work, Preparing Meals, Laundry, and Household Activities     Living Situation: Lives alone; with family near who check in. Pt also has a life alert button.     Visual Deficits: Wears glasses    Hearing Deficits: WFL    Premorbid Assistive Device: glasses    Assistive Device used during evaluation: glasses    Behavior: alert, appropriate, and cooperative    OBJECTIVE:  Clinical Observations: alert and verbal communication    Treatment Observations:  n/a    Pain Level     Did not state pain      Oral Musculature Evaluation  Oral Musculature Evaluation  Oral Musculature: WFL  Dentition: present and adequate  Secretion Management: adequate (pt reports mild cough/ congestion)  Mucosal Quality: adequate  Mandibular Strength and Mobility: WFL  Oral Labial Strength and Mobility: WFL  Lingual Strength and Mobility: WFL  Velar Elevation: WFL  Buccal Strength and Mobility: decreased tone  Volitional Cough: weak  Volitional Swallow: WFL  Voice Prior to PO Intake: hoarse; low intensity    Motor Speech:  Diadochokinetic Rate: WFL    Intelligibility:   Word WFL and Conversation Mildly impaired; speech intelligibility is functional; pt recommended to attempt to open mouth wider when speaking and use diaphragm to project voice.     Voice:  Vocal Intensity: Decreased Intensity  Vocal Quality/Resonance: Hoarse; but deemed WFL for verbal expression     Fluency/[place]ody: WFL    Receptive Language:   Following commands: One step WFL  multistep basic commands WFL  Answering yes/no questions: " simple WFL and personal WFL  Imitate actions/gestures: WFL  Identify: Objects WFL    Expressive Language:  Repetition: Repetition of words WFL     Automatic speech:      Responsive speech: WFL     Naming: WFL     Word finding: Mildly impaired within conversation but deemed functional     Higher Level Linguistic/Cognition:  Orientation: Person WFL, Place WFL, Time WFL, Situation WFL    Long term memory: WFL     Short term memory: Immediate WFL and Delayed WFL     Problem solving: basic WFL     Reasoning: basic WFL    Visual Spatial: Attend to Right WFL and Left WFL    Reading: Sentence level WFL        Pragmatics:   No deficits observed  WFL  Attention: No obvious deficits observed WFL    Cognitive/Linguistic Assessment  Activity Capabilities: family support, cooperative, motivated, orientated x4, alert, verbal communication, receptive language, expressive language, cognitive-linguistic, swallowing, memory, and motor speech    Activity Limitations: intelligibility      Impressions/interpretations: Person served presents with WFL expressive language deficits, WFL receptive language deficits, WFL memory deficits, and WFL problem solving deficits. Pt presents with functional speech, language, and cognitive skills at this time.     Swallow Assessment:  Reason for Referral: A bedside swallow evaluation was performed to assess the efficiency of the patient's swallow function, rule out aspiration and make recommendations regarding safe dietary consistencies, and effective compensatory strategies.    Oral Musculature Evaluation  Oral Musculature: WFL  Dentition: present and adequate  Secretion Management: adequate (pt reports mild cough/ congestion)  Mucosal Quality: adequate  Mandibular Strength and Mobility: WFL  Oral Labial Strength and Mobility: WFL  Lingual Strength and Mobility: WFL  Velar Elevation: WFL  Buccal Strength and Mobility: decreased tone  Volitional Cough: weak  Volitional Swallow: WFL  Voice Prior to PO  "Intake: hoarse; low intensity    Bedside Swallow Eval:   Consistencies Assessed:  Thin liquids via straw and cup sip  Puree via spoon  Cracker via small bite      Oral Phase:   Thin liquids WFL  Puree WFL  Cracker WFL    Pharyngeal Phase:   Thin liquids throat clearing  Puree no overt clinical signs/symptoms of aspiration and no overt clinical signs/symptoms of pharyngeal dysphagia  Cracker no overt clinical signs/symptoms of aspiration and no overt clinical signs/symptoms of pharyngeal dysphagia    Compensatory Strategies  None    Interpretation: Patient with functional swallow of the oral, pharyngeal, and esophageal stage of the swallow.    Activity Capabilities: dentition, alertness, oral motor abilities, comprehension, expression , cognition , participation , and cooperation     Activity Limitations:  none    Pt states some difficulty swallowing dry meat and that she has had to have her esophagus stretched recently in the past; SLP suggested adding gravy to meat, taking small bites, and alternating with liquids.     Pt reports she has always had a little throat clear with PO intake; denies any pain, globus sensation, choking, or sensation of liquid "going down the wrong pipe".     Reports a low appetite and poor eating habits with often forgetting to eat during the day; but no real difficulty regarding her swallow mechanism.       Treatment:  not warranted    PLAN:  Recommendations: Speech therapy Is not warranted at this time.     Patient report understanding of the information provided:  role of SLP, ST not warranted at this time, recommended standard strategies and precautions.     Assessment:  Katarzyna Warner is a 87 y.o. female with a medical diagnosis of <principal problem not specified> and presents with a functional swallow of the oral, pharyngeal, and esophageal stages at this time. Pt presents with overall functional speech, language, and cognitive skills at this time.        Discharge " recommendations: Discharge Facility/Level of Care Needs: home with home health     Goals:   Multidisciplinary Problems       SLP Goals       Not on file                     Plan:   Patient to be seen    Planned Interventions:    Plan of Care expires:    Plan of Care reviewed with: patient            11/10/2022

## 2022-11-10 PROBLEM — R53.81 DEBILITY: Status: ACTIVE | Noted: 2022-11-10

## 2022-11-10 LAB
ALBUMIN SERPL BCP-MCNC: 2.5 G/DL (ref 3.5–5.2)
ALP SERPL-CCNC: 66 U/L (ref 55–135)
ALT SERPL W/O P-5'-P-CCNC: 24 U/L (ref 10–44)
ANION GAP SERPL CALC-SCNC: 4 MMOL/L (ref 8–16)
AST SERPL-CCNC: 20 U/L (ref 10–40)
BASOPHILS # BLD AUTO: 0.04 K/UL (ref 0–0.2)
BASOPHILS NFR BLD: 0.6 % (ref 0–1.9)
BILIRUB SERPL-MCNC: 0.3 MG/DL (ref 0.1–1)
BUN SERPL-MCNC: 14 MG/DL (ref 8–23)
CALCIUM SERPL-MCNC: 9.1 MG/DL (ref 8.7–10.5)
CHLORIDE SERPL-SCNC: 106 MMOL/L (ref 95–110)
CO2 SERPL-SCNC: 32 MMOL/L (ref 23–29)
CREAT SERPL-MCNC: 0.7 MG/DL (ref 0.5–1.4)
DIFFERENTIAL METHOD: ABNORMAL
EOSINOPHIL # BLD AUTO: 0.5 K/UL (ref 0–0.5)
EOSINOPHIL NFR BLD: 7.7 % (ref 0–8)
ERYTHROCYTE [DISTWIDTH] IN BLOOD BY AUTOMATED COUNT: 12 % (ref 11.5–14.5)
EST. GFR  (NO RACE VARIABLE): >60 ML/MIN/1.73 M^2
GLUCOSE SERPL-MCNC: 97 MG/DL (ref 70–110)
HCT VFR BLD AUTO: 29.2 % (ref 37–48.5)
HGB BLD-MCNC: 9.6 G/DL (ref 12–16)
IMM GRANULOCYTES # BLD AUTO: 0.03 K/UL (ref 0–0.04)
IMM GRANULOCYTES NFR BLD AUTO: 0.5 % (ref 0–0.5)
LYMPHOCYTES # BLD AUTO: 1.1 K/UL (ref 1–4.8)
LYMPHOCYTES NFR BLD: 16.6 % (ref 18–48)
MCH RBC QN AUTO: 31.8 PG (ref 27–31)
MCHC RBC AUTO-ENTMCNC: 32.9 G/DL (ref 32–36)
MCV RBC AUTO: 97 FL (ref 82–98)
MONOCYTES # BLD AUTO: 0.7 K/UL (ref 0.3–1)
MONOCYTES NFR BLD: 11.2 % (ref 4–15)
NEUTROPHILS # BLD AUTO: 4.1 K/UL (ref 1.8–7.7)
NEUTROPHILS NFR BLD: 63.4 % (ref 38–73)
NRBC BLD-RTO: 0 /100 WBC
PLATELET # BLD AUTO: 239 K/UL (ref 150–450)
PMV BLD AUTO: 10.3 FL (ref 9.2–12.9)
POTASSIUM SERPL-SCNC: 4.2 MMOL/L (ref 3.5–5.1)
PROT SERPL-MCNC: 6 G/DL (ref 6–8.4)
RBC # BLD AUTO: 3.02 M/UL (ref 4–5.4)
SODIUM SERPL-SCNC: 142 MMOL/L (ref 136–145)
WBC # BLD AUTO: 6.5 K/UL (ref 3.9–12.7)

## 2022-11-10 PROCEDURE — 85025 COMPLETE CBC W/AUTO DIFF WBC: CPT | Performed by: INTERNAL MEDICINE

## 2022-11-10 PROCEDURE — 11800000 HC REHAB PRIVATE ROOM

## 2022-11-10 PROCEDURE — 97535 SELF CARE MNGMENT TRAINING: CPT

## 2022-11-10 PROCEDURE — 25000003 PHARM REV CODE 250: Performed by: INTERNAL MEDICINE

## 2022-11-10 PROCEDURE — 80053 COMPREHEN METABOLIC PANEL: CPT | Performed by: INTERNAL MEDICINE

## 2022-11-10 PROCEDURE — 63600175 PHARM REV CODE 636 W HCPCS: Performed by: INTERNAL MEDICINE

## 2022-11-10 PROCEDURE — 36415 COLL VENOUS BLD VENIPUNCTURE: CPT | Performed by: INTERNAL MEDICINE

## 2022-11-10 PROCEDURE — 97530 THERAPEUTIC ACTIVITIES: CPT

## 2022-11-10 PROCEDURE — 97110 THERAPEUTIC EXERCISES: CPT

## 2022-11-10 RX ORDER — CIPROFLOXACIN 250 MG/1
250 TABLET, FILM COATED ORAL EVERY 12 HOURS
Status: DISCONTINUED | OUTPATIENT
Start: 2022-11-11 | End: 2022-11-17 | Stop reason: HOSPADM

## 2022-11-10 RX ORDER — PREDNISONE 1 MG/1
2 TABLET ORAL DAILY
Status: DISCONTINUED | OUTPATIENT
Start: 2022-11-10 | End: 2022-11-17 | Stop reason: HOSPADM

## 2022-11-10 RX ADMIN — MIRABEGRON 50 MG: 50 TABLET, FILM COATED, EXTENDED RELEASE ORAL at 08:11

## 2022-11-10 RX ADMIN — LOSARTAN POTASSIUM 50 MG: 50 TABLET, FILM COATED ORAL at 08:11

## 2022-11-10 RX ADMIN — PREDNISONE 2 MG: 1 TABLET ORAL at 04:11

## 2022-11-10 RX ADMIN — CEFTRIAXONE 1 G: 1 INJECTION, SOLUTION INTRAVENOUS at 05:11

## 2022-11-10 RX ADMIN — DOCUSATE SODIUM 100 MG: 100 CAPSULE, LIQUID FILLED ORAL at 08:11

## 2022-11-10 RX ADMIN — Medication 6 MG: at 08:11

## 2022-11-10 RX ADMIN — PRAMIPEXOLE DIHYDROCHLORIDE 0.25 MG: 0.12 TABLET ORAL at 08:11

## 2022-11-10 RX ADMIN — ATORVASTATIN CALCIUM 40 MG: 40 TABLET, FILM COATED ORAL at 08:11

## 2022-11-10 RX ADMIN — ALUMINUM HYDROXIDE, MAGNESIUM HYDROXIDE, AND DIMETHICONE 15 ML: 200; 20; 200 SUSPENSION ORAL at 08:11

## 2022-11-10 RX ADMIN — CEFTRIAXONE 1 G: 1 INJECTION, SOLUTION INTRAVENOUS at 04:11

## 2022-11-10 RX ADMIN — LEVOTHYROXINE SODIUM 88 MCG: 88 TABLET ORAL at 05:11

## 2022-11-10 RX ADMIN — FAMOTIDINE 20 MG: 20 TABLET, FILM COATED ORAL at 08:11

## 2022-11-10 RX ADMIN — HYDROXYCHLOROQUINE SULFATE 200 MG: 200 TABLET ORAL at 08:11

## 2022-11-10 NOTE — PLAN OF CARE
Problem: Rehabilitation ([facility]) Plan of Care  Goal: Plan of Care Review  Outcome: Ongoing, Not Progressing  Goal: Patient-Specific Goal (Individualized)  Outcome: Ongoing, Not Progressing  Goal: Absence of New-Onset Illness or Injury  Outcome: Ongoing, Not Progressing  Goal: Optimal Comfort and Wellbeing  Outcome: Ongoing, Not Progressing  Goal: Readiness for Transition of Care  Outcome: Ongoing, Not Progressing     Problem: Fall Injury Risk  Goal: Absence of Fall and Fall-Related Injury  Outcome: Ongoing, Not Progressing     Problem: Mobility Impairment  Goal: Optimal Mobility  Outcome: Ongoing, Not Progressing     Problem: Infection  Goal: Absence of Infection Signs and Symptoms  Outcome: Ongoing, Not Progressing     Problem: Skin Injury Risk Increased  Goal: Skin Health and Integrity  Outcome: Ongoing, Not Progressing     Problem: Pain Chronic (Persistent)  Goal: Acceptable Pain Control and Functional Ability  Outcome: Ongoing, Not Progressing

## 2022-11-10 NOTE — PT/OT/SLP PROGRESS
Occupational Therapy  Treatment    Katarzyna Warner   MRN: [id number]   Admitting Diagnosis: Debility    OT Date of Treatment: 11/10/22   OT Start Time: 0915  OT Stop Time: 1115  OT Total Time (min): 120 min    Treatment Type: Individual 90 and Concurrent 30     Billable Minutes:  Self Care/Home Management 30, Therapeutic Activity 30, and Therapeutic Exercise 60  Total Minutes: 120     OT/TAL: OT          General Precautions: Standard, fall  Orthopedic Precautions: N/A  Braces:      Spiritual, Cultural Beliefs, Moravian Practices, Values that Affect Care: no    Subjective:  Communicated with nurse prior to session.    Pain/Comfort  Pain Rating 1: 3/10  Location - Orientation 1: lower  Location 1: back  Pain Addressed 1: Reposition, Cessation of Activity  Pain Rating Post-Intervention 1: 2/10    Objective:  Pt was cooperative and motivated with verbal encouragement while exhibiting positive affect. She participated in functional transfer retraining throughout session to / from bed, chair, and toilet emphasizing fall prevention providing extra time with repetition requiring min assist secondary to steadying assist with mod verbal and tactile cueing for posture, safety awareness, and technique with use of AD. Pt then participated in therapeutic activity addressing trunk mobility, trunk control, dynamic sitting balance, trunk strength, and stretching of bilateral shoulders to end range with flexion and abduction utilizing large stability ball challenging her to perform trunk flexion, extension, and lateral flexion with bilateral hands stabilized on ball requiring verbal and tactile cueing to facilitate optimal movement patterns and increased range per trial in order to improve active ROM impacting performance with functional tasks below waist. Next, she participated in therapeutic exercise performing 5x10 seated pushups requiring verbal and tactile cueing for technique challenging her to lift buttocks off seated surface  to end range elbow extension in order to strengthen triceps brachii to improve performance with sit <> stand transitions particularly from lower surfaces. Pt then participated in ADL retraining regarding toileting emphasizing fall prevention providing extra time requiring min assist secondary to steadying assist during clothing management with verbal and tactile cueing for safety awareness and technique. Also, she participated in therapeutic exercise performing 3x10 bilateral UE strengthening exercises utilizing thin to moderate resistance theraband with scapular retraction, shoulder extension, shoulder adduction, horizontal abduction, shoulder flexion in plane of scaption, internal rotation, external rotation, and elbow flexion requiring continuous verbal and tactile cueing for technique emphasizing quality of movement.     Functional Mobility:  Bed Mobility:   Supine to sit: Minimal Assistance   Sit to supine: Minimal Assistance   Rolling: Standby Assistance   Scooting: Standby Assistance    Transfer Training:   Sit to stand:Minimal Assistance with Rolling Walker .  Bed <> Chair:  Step Transfer with Minimal Assistance with Rolling Walker .  Toilet Transfer:  Pt Step Transfer with Minimal Assistance with Grab bars .    Toilet Training:  Pt performed toileting with Minimal Assistance with Grab  bar at [employer].    Balance:   Static Sit: GOOD-: Takes MODERATE challenges from all directions but inconsistently  Dynamic Sit:  FAIR+: Maintains balance through MINIMAL excursions of active trunk motion  Static Stand: FAIR: Maintains without assist but unable to take challenges  Dynamic stand: FAIR: Needs CONTACT GUARD during gait      Patient left HOB elevated with all lines intact, call button in reach, and nurse notified    ASSESSMENT:  Pt demonstrated good effort and motivation impacting overall functional performance and pace of progress with functional long term goals allowing patient to progress towards achieving  modified independence in order to return home safely alone.     Rehab potential is good    Activity tolerance: Fair    Discharge recommendations: other (see comments) (To be further determined based on progress prior to discharge.)     Equipment recommendations: none     GOALS:   Multidisciplinary Problems       Occupational Therapy Goals          Problem: Occupational Therapy    Goal Priority Disciplines Outcome Interventions   Occupational Therapy Goal     OT, PT/OT     Description: Long Term Goals to be met by: 11/23/22     Patient will increase functional independence with ADLs by performing:    Feeding with [place].  UE Dressing with Modified [place].  LE Dressing with Modified [place].  Grooming while standing at sink with Modified [place].  Toileting from toilet with Modified [place] for hygiene and clothing management.   Bathing from  shower chair/bench with Modified [place].  Step transfer with Modified [place]  Toilet transfer to toilet with Modified [place].  Increased functional strength to 4+/5 for bilateral UE's.                         Plan:  Patient to be seen 5 x/week (90 min per day, for 14 days) to address the above listed problems via self-care/home management, community/work re-entry, therapeutic activities, therapeutic exercises  Plan of Care expires: 11/23/22  Plan of Care reviewed with: patient         11/10/2022

## 2022-11-10 NOTE — CONSULTS
[facility])  Adult Nutrition  Consult Note    SUMMARY     Recommendations  1. Rec'd Cardiac diet.   2. Rec'd Tristan BID to promote wound healing and provide 192kcal and 5g protein. 3. Rec'd ONS: Vanilla Ensure Enlive BID to provide 700kcal and 40g protein.   4. RD to follow and make rec's accordingly.  Goals:   1. Pt will consume > 75% EEN via PO intake by next RD follow up.  Nutrition Goal Status: new  Communication of RD Recs: reviewed with RN    Assessment and Plan    Nutrition Problem  Inadequate oral intake    Related to (etiology):   Decreased appetite    Signs and Symptoms (as evidenced by):   Pt stating she forgets to eat, PO intake 50%     Interventions/Recommendations (treatment strategy):  1. Rec'd Cardiac diet.   2. Rec'd Tristan BID to promote wound healing and provide 192kcal and 5g protein. 3. Rec'd ONS: Vanilla Ensure Enlive BID to provide 700kcal and 40g protein.   4. RD to follow and make rec's accordingly.    Nutrition Diagnosis Status:   New    Reason for Assessment    Reason For Assessment: other (see comments) (New IPR admit)  Diagnosis: other (see comments) (No active principal problem)  Relevant Medical History: Acute cystitis without hematuria, Altered mental status, Anemia, Anxiety, Rheumatoid Arthritis, Bell's palsy, Bursitis of left shoulder, Carpal tunnel syndrome, Debility, Elevated troponin, Erosive osteoarthritis of both hands,  Esophagus disorder, Function kidney decreased, GERD, High cholesterol,  Hypertension, Hypokalemia,  Hypothyroidism, Kyphosis of cervical region, kyphosis type, Lumbar pain,  Migraine headache, Postmenopausal disorder, Shingles, Thrombocytopenia, Thyroid disease, Unspecified cataract,  Weakness  Interdisciplinary Rounds: did not attend  General Information Comments: RD triggered for new IPR admit. Spoke with pt about current appetite and PO intake. Pt stated that she is eating between 50-75% of her meals and her appetite is improving. Pt also  "stated that she is not a big eater. Pt stated that sometimes she forgets to eat becuase she isn't always hungry. Rec'd ONS: Ensure and pt stated that she likes the Vanilla. Will order as per RD protocol. Also obtained food preferences/dislikes. RD to follow and make rec's accordingly.  Nutrition Discharge Planning: TBD as care progresses    Nutrition Risk Screen    Nutrition Risk Screen: no indicators present    Nutrition/Diet History    Food Preferences: Likes: Vanilla Ensure, seafood, mashed potatoes; Dislikes: Peas & rice  Spiritual, Cultural Beliefs, Christianity Practices, Values that Affect Care: no  Food Allergies: NKFA    Anthropometrics    Temp: 97.7 °F (36.5 °C)  Height Method: Stated  Height: 5' 6" (167.6 cm)  Height (inches): 66 in  Weight Method: Bed Scale  Weight: 45.5 kg (100 lb 5 oz)  Weight (lb): 100.31 lb  Ideal Body Weight (IBW), Female: 130 lb  % Ideal Body Weight, Female (lb): 77.16 %  BMI (Calculated): 16.2  BMI Grade: 16 - 16.9 protein-energy malnutrition grade II    Lab/Procedures/Meds    Pertinent Labs Reviewed: reviewed  Pertinent Medications Reviewed: reviewed    Estimated/Assessed Needs    Weight Used For Calorie Calculations: 45.5 kg (100 lb 5 oz)  Energy Calorie Requirements (kcal): 1108 (MSJ x 1.2SF)  Energy Need Method: [facility]  Protein Requirements: 55-68 (1.2-2.5g/kg)  Weight Used For Protein Calculations: 45.5 kg (100 lb 5 oz)  Fluid Requirements (mL): 1108 (1mL/kcal)  Estimated Fluid Requirement Method: RDA Method  RDA Method (mL): 1108    Nutrition Prescription Ordered    Current Diet Order: Regular  Oral Nutrition Supplement: None    Evaluation of Received Nutrient/Fluid Intake    % Kcal Needs: 75%  % Protein Needs: 75%  I/O: +720  Energy Calories Required: meeting needs  Protein Required: meeting needs  Tolerance: tolerating  % Intake of Estimated Energy Needs: 50 - 75 %  % Meal Intake: 50 - 75 %    Nutrition Risk    Level of Risk/Frequency of Follow-up: moderate "     Monitor and Evaluation    Food and Nutrient Intake: energy intake, food and beverage intake  Food and Nutrient Adminstration: diet order  Knowledge/Beliefs/Attitudes: food and nutrition knowledge/skill, beliefs and attitudes  Physical Activity and Function: nutrition-related ADLs and IADLs  Anthropometric Measurements: height/length, weight, weight change, body mass index  Biochemical Data, Medical Tests and Procedures: electrolyte and renal panel, gastrointestinal profile, glucose/endocrine profile, inflammatory profile, lipid profile  Nutrition-Focused Physical Findings: overall appearance     Nutrition Follow-Up    RD Follow-up?: Yes

## 2022-11-10 NOTE — PLAN OF CARE
Recommendations  1. Rec'd Cardiac diet.   2. Rec'd Tristan BID to promote wound healing and provide 192kcal and 5g protein. 3. Rec'd ONS: Vanilla Ensure Enlive BID to provide 700kcal and 40g protein.   4. RD to follow and make rec's accordingly.  Goals:   1. Pt will consume > 75% EEN via PO intake by next RD follow up.  Nutrition Goal Status: new

## 2022-11-10 NOTE — PT/OT/SLP PROGRESS
Physical Therapy         Treatment        Katarzyna Warner   MRN: [id number]     PT Received On: 11/10/22  PT Start Time: 1400     PT Stop Time: 1500    PT Total Time (min): 60 min       Billable Minutes:  Gait [id number], Therapeutic Activity 15, and Therapeutic Exercise 15  Total Minutes: 60    Treatment Type: Treatment  PT/PTA: PT             General Precautions: Standard,    Orthopedic Precautions:     Braces:           Subjective:  Communicated with nurse prior to session.         Objective:  Patient found in bed, with      Functional Mobility:  Bed Mobility:   Supine to sit: Standby Assistance   Sit to supine: Standby Assistance   Rolling: Standby Assistance   Scooting: Standby Assistance    Balance:   Static Sit: GOOD-: Takes MODERATE challenges from all directions but inconsistently  Dynamic Sit:  FAIR+: Maintains balance through MINIMAL excursions of active trunk motion  Static Stand: FAIR: Maintains without assist but unable to take challenges  Dynamic stand: FAIR: Needs CONTACT GUARD during gait    Transfer Training:  Sit to stand:Contact Guard Assistance with Rolling Walker    Bed <> Chair:  Step Transfer with Minimal Assistance with Rolling Walker      Wheelchair Training:  Sba with wc 150ft    Gait Training:  Ambulated 150ft x 3 attempts with rw sba/cga    Stair Training:  Went up and down 12 steps with sba/cga using bilateral rails      Additional Treatment:  Pt performed 3 sets 10 reps of sit to stand cga/sba.. She also performed nu step x 10 min with no resistance    Activity Tolerance:  Patient tolerated treatment well    Patient left supine with call button in reach.    Assessment:  Katarzyna Warner is a 87 y.o. female with a medical diagnosis of <principal problem not specified>. She presents with performing bed mobility and transfers with less assist needed.    Rehab potential is good.    Activity tolerance: Good    Discharge recommendations:       Equipment recommendations:       GOALS:    Multidisciplinary Problems       Physical Therapy Goals          Problem: Physical Therapy    Goal Priority Disciplines Outcome Goal Variances Interventions   Physical Therapy Goal     PT, PT/OT      Description: Short term goals to be made 22    Patient will increase functional independence with mobility by performin. Supine to sit with SBA  2. Sit to supine with SBA  3. Sit to stand transfer with Stand-by Assistance  4. Bed to chair transfer with Supervision using Rolling Walker  5. Gait  x 150 feet with Supervision using Rolling Walker.   6. Ascend/descend 8 stair with bilateral Handrails supervision    Long term goals to be met by 22 .     1. Supine to sit with modified independent  2. Sit to supine with modified independent   3. Sit to stand transfer with modified independence  4. Bed to chair transfer with modified independence using Rolling Walker  5. Gait  x 150 feet with modified independence using Rolling Walker.   6. Ascend/descend 12 stair with bilateral Handrails modified independence                         PLAN:    Patient to be seen    to address the above listed problems via    Plan of Care expires:    Plan of Care reviewed with:           11/10/2022

## 2022-11-10 NOTE — NURSING
Notified Carloz (daughter) to bring pt prednisone 1 mg. Our pharmacy does not carry this dosage. Carloz stated that she will bring it this afternoon.

## 2022-11-10 NOTE — PLAN OF CARE
[facility])  Initial Discharge Assessment       Primary Care Provider: Juliet Barclay MD    Admission Diagnosis: Debility [R53.81]    Admission Date: 11/9/2022  Expected Discharge Date: 11/9/2022    Discharge Barriers Identified: None    Payor: MEDICARE / Plan: MEDICARE PART A & B / Product Type: Government /     Extended Emergency Contact Information  Primary Emergency Contact: Erik Shukla  Mobile Phone: [phone]  Relation: Daughter  Secondary Emergency Contact: ERIK SHUKLA  Mobile Phone: [phone]  Relation: Daughter  Preferred language: English   needed? No    Discharge Plan A: Home Health  Discharge Plan B: Home Health      [id number] - [place], [place] - [address]  [address]  [facility] [address]  Phone: [phone] Fax: [phone]      Initial Assessment (most recent)       Adult Discharge Assessment - 11/09/22 1440          Discharge Assessment    Assessment Type Discharge Planning Assessment     Confirmed/corrected address, phone number and insurance Yes     Confirmed Demographics Correct on Facesheet     Source of Information patient;family   Erik Shukla (daughter)    When was your last doctors appointment? --   Sometime 2 months ago with PCP for a check-up.    Communicated REGINALDO with patient/caregiver Date not available/Unable to determine     Reason For Admission Debility     Lives With alone     Facility Arrived From: [facility]     Do you expect to return to your current living situation? Yes     Do you have help at home or someone to help you manage your care at home? Yes     Who are your caregiver(s) and their phone number(s)? Pt does not have a caregiver but has assistance from children if needed     Prior to hospitilization cognitive status: Alert/Oriented     Current cognitive status: Alert/Oriented     Walking or Climbing Stairs Difficulty ambulation difficulty, requires equipment     Mobility Management Pt uses a cane or a rollator  outside of the home, but denies using DME inside home     Dressing/Bathing Difficulty none     Home Accessibility not wheelchair accessible     Number of Stairs, Main Entrance two     Stair Railings, Main Entrance railings on both sides of stairs     Home Layout Able to live on 1st floor     Equipment Currently Used at Home bedside commode;cane, straight;grab bar;shower chair;rollator;walker, rolling     Readmission within 30 days? No     Do you currently have service(s) that help you manage your care at home? Yes     Name and Contact number of agency [facility] [phone]     Is the pt/caregiver preference to resume services with current agency Yes     Do you take prescription medications? Yes     Do you have prescription coverage? Yes     Coverage Medicare     Do you have any problems affording any of your prescribed medications? No     Is the patient taking medications as prescribed? yes   Daughter reported that she manages pt's pill box    How do you get to doctors appointments? family or friend will provide     Are you on dialysis? No     Do you take coumadin? No     Discharge Plan A Home Health     Discharge Plan B Home Health     DME Needed Upon Discharge  none     Discharge Plan discussed with: Patient;Adult children     Discharge Barriers Identified None        Physical Activity    On average, how many days per week do you engage in moderate to strenuous exercise (like a brisk walk)? 0 days     On average, how many minutes do you engage in exercise at this level? 0 min        Financial Resource Strain    How hard is it for you to pay for the very basics like food, housing, medical care, and heating? Not hard at all        Housing Stability    In the last 12 months, was there a time when you were not able to pay the mortgage or rent on time? No     In the last 12 months, how many places have you lived? 1     In the last 12 months, was there a time when you did not have a steady place to sleep or  slept in a shelter (including now)? No        Transportation Needs    In the past 12 months, has lack of transportation kept you from medical appointments or from getting medications? No     In the past 12 months, has lack of transportation kept you from meetings, work, or from getting things needed for daily living? No        Food Insecurity    Within the past 12 months, you worried that your food would run out before you got the money to buy more. Never true     Within the past 12 months, the food you bought just didn't last and you didn't have money to get more. Never true        Stress    Do you feel stress - tense, restless, nervous, or anxious, or unable to sleep at night because your mind is troubled all the time - these days? Only a little        Social Connections    In a typical week, how many times do you talk on the phone with family, friends, or neighbors? Once a week     How often do you get together with friends or relatives? More than three times a week     How often do you attend Yarsani or Oriental orthodox services? Never     Do you belong to any clubs or organizations such as Yarsani groups, unions, fraternal or athletic groups, or school groups? No     How often do you attend meetings of the clubs or organizations you belong to? Never     Are you , , , , never , or living with a partner?         Alcohol Use    Q1: How often do you have a drink containing alcohol? Monthly or less                   CM discharge assessment complete with patient and pt's daughter Carloz Warner. Pt lives alone in a home with 2 steps to enter with hand rails on bilateral sides. Prior to hospital admission, pt utilized a cane or a rollator outside of the home, but denied using any DME inside the home. Pt was independent with ADLs and daughter reported that pt was still driving. Pt currently has services with [facility] and would like to resume their services at discharge. The  plan is for patient to return home with [facility] at discharge. Pt has assistance from her children if needed. CM will continue to follow and assist as needed.

## 2022-11-11 PROCEDURE — 97535 SELF CARE MNGMENT TRAINING: CPT

## 2022-11-11 PROCEDURE — 63600175 PHARM REV CODE 636 W HCPCS: Performed by: INTERNAL MEDICINE

## 2022-11-11 PROCEDURE — 97530 THERAPEUTIC ACTIVITIES: CPT

## 2022-11-11 PROCEDURE — 97116 GAIT TRAINING THERAPY: CPT

## 2022-11-11 PROCEDURE — 97110 THERAPEUTIC EXERCISES: CPT

## 2022-11-11 PROCEDURE — 25000003 PHARM REV CODE 250: Performed by: INTERNAL MEDICINE

## 2022-11-11 PROCEDURE — 11800000 HC REHAB PRIVATE ROOM

## 2022-11-11 RX ADMIN — Medication 6 MG: at 08:11

## 2022-11-11 RX ADMIN — LEVOTHYROXINE SODIUM 88 MCG: 88 TABLET ORAL at 05:11

## 2022-11-11 RX ADMIN — LOSARTAN POTASSIUM 50 MG: 50 TABLET, FILM COATED ORAL at 08:11

## 2022-11-11 RX ADMIN — ATORVASTATIN CALCIUM 40 MG: 40 TABLET, FILM COATED ORAL at 08:11

## 2022-11-11 RX ADMIN — PRAMIPEXOLE DIHYDROCHLORIDE 0.25 MG: 0.12 TABLET ORAL at 08:11

## 2022-11-11 RX ADMIN — MIRABEGRON 50 MG: 50 TABLET, FILM COATED, EXTENDED RELEASE ORAL at 08:11

## 2022-11-11 RX ADMIN — DOCUSATE SODIUM 100 MG: 100 CAPSULE, LIQUID FILLED ORAL at 08:11

## 2022-11-11 RX ADMIN — PREDNISONE 2 MG: 1 TABLET ORAL at 08:11

## 2022-11-11 RX ADMIN — ALUMINUM HYDROXIDE, MAGNESIUM HYDROXIDE, AND DIMETHICONE 15 ML: 200; 20; 200 SUSPENSION ORAL at 08:11

## 2022-11-11 RX ADMIN — CIPROFLOXACIN 250 MG: 250 TABLET, COATED ORAL at 08:11

## 2022-11-11 RX ADMIN — ALUMINUM HYDROXIDE, MAGNESIUM HYDROXIDE, AND DIMETHICONE 15 ML: 200; 20; 200 SUSPENSION ORAL at 03:11

## 2022-11-11 RX ADMIN — FAMOTIDINE 20 MG: 20 TABLET, FILM COATED ORAL at 08:11

## 2022-11-11 NOTE — H&P
[facility]  History & Physical    Patient Name: Katarzyna Warner  MRN: [id number]  Patient Class: IP- Rehab  Admission Date: 11/9/2022  Attending Physician: Ayo Nolen III, MD   Primary Care Provider: Juliet Barclay MD         Patient information was obtained from patient, relative(s) and ER records.     Subjective:     Principal Problem:Debility    Chief Complaint:   Chief Complaint   Patient presents with    Fatigue        HPI: POST ADMISSION PHYSICIAN ASSESSMENT AND   REHAB HISTORICAL/PHYSICAL        CHIEF COMPLAINT: weakness    PRIMARY REHAB IMPAIRMENT GROUP: Debility    ETIOLOGIC DIAGNOSIS:  Acute cystitis without hematuria    SECONDARY AND RELATED DIAGNOSES:  Arthritis, hypertension, hypokalemia, hypothyroidism, UTI    CO-MORBIDITIES PRESENT ON ADMISSION:  Anemia, anxiety, chronic arthritis, hyperlipidemia    87-year-old female with history of anemia anxiety, chronic arthritis, hypertension and previous UTIs presented to the emergency department on 11/7/22 with nausea, frequent urination and generalized weakness.  Chest x-ray showed no acute changes vital signs were stable and patient was admitted for observation for IV antibiotics and fluids for acute cystitis without hematuria.  On 11/8 she developed a fever and was lethargic and chills and was very weak.  Therapy was started to help with the weakness and on 11/09 her acute medical needs were addressed and she was transferred to inpatient rehab for ongoing management of debility.  Prior to hospital admission, patient lived alone and was independent utilizing straight cane.  Currently patient is standby assistance for functional mobility and ambulating 80 ft with CGA with a rolling walker and Min to mod A for ADLs.  She had significant decline in functional ADLs and would benefit from multidisciplinary approach to regain strength, endurance and safety awareness.  She continues to need 24 hour medical management for  medication maintenance and adjustments, lab monitoring, IV care, accurate I's and nose, monitoring of skin integrity, respiratory monitoring, monitoring glucose levels cognition and therapy so she can return alone to her prior level function.  At this time she will benefit from admission to acute rehab unit for continuation medical supervision by Internal Medicine.  In addition admission to acute rehab unit will add treatment by multidisciplinary team to improve functional status prior to discharge.      Past Medical History:   Diagnosis Date    Acute cystitis without hematuria 2021    Altered mental status 2021    Anemia 2021    Anxiety     Arthritis     Arthritis, rheumatoid     Bell's palsy     Bursitis of left shoulder     Carpal tunnel syndrome, bilateral     Debility 2021    Elevated troponin 2021    Erosive osteoarthritis of both hands     Esophagus disorder     needs to have her esophagus stretched again    Fall 2021    Function kidney decreased     GERD (gastroesophageal reflux disease)     High cholesterol     Hypertension     Hypokalemia 2021    Hypothyroidism     Kyphosis of cervical region, unspecified kyphosis type     Lumbar pain     Migraine headache     Postmenopausal disorder     Shingles     Thrombocytopenia     Thyroid disease     Unspecified cataract     Weakness 2021       Past Surgical History:   Procedure Laterality Date    BACK SURGERY      BLADDER SURGERY  2006    Dr Robert in [place] stem cell implant to bladder    CATARACT EXTRACTION Left     Dr Barroso 2016     SECTION      COLONOSCOPY  10/2012    Dr Corbett    ESOPHAGOGASTRODUODENOSCOPY  2021    schatzkis ring - dilated, gastroduodenitis, bile reflux - pickard    HYSTERECTOMY      NOSE SURGERY  2021    Dr De Souza in [place] removal of skin cancer    OOPHORECTOMY  1977    TONSILLECTOMY         Review of patient's allergies indicates:   Allergen Reactions    Ace inhibitors  Anaphylaxis     cough    Belladonna alkaloids Anaphylaxis     Other reaction(s): Low Platelet Count    Librax (with clidinium) [chlordiazepoxide-clidinium] Anaphylaxis    Methotrexate analogues Anaphylaxis and Nausea And Vomiting    Codeine     Codeine sulfate      Other reaction(s): Rash, dizziness    Cymbalta [duloxetine] Hallucinations    Methotrexate sodium      Other reaction(s): wt loss, n/v    Solifenacin Hallucinations       No current facility-administered medications on file prior to encounter.     Current Outpatient Medications on File Prior to Encounter   Medication Sig    acetaminophen (TYLENOL) 325 MG tablet Take 2 tablets (650 mg total) by mouth every 8 (eight) hours as needed for Temperature greater than (or equal to 101 degree F).    cholecalciferol, vitamin D3, (VITAMIN D3) 50 mcg (2,000 unit) Cap Take 5,000 Units by mouth once daily. 5,000un qd    docusate sodium (COLACE) 100 MG capsule Take 100 mg by mouth nightly.    famotidine (PEPCID) 20 MG tablet Take 1 tablet (20 mg total) by mouth 2 (two) times daily.    hydrOXYchloroQUINE (PLAQUENIL) 200 mg tablet Take 200 mg by mouth every other day. Every other day    levothyroxine (SYNTHROID) 88 MCG tablet TAKE 1 TABLET BY MOUTH EVERY DAY    losartan (COZAAR) 50 MG tablet Take 1 tablet (50 mg total) by mouth once daily.    mirabegron (MYRBETRIQ) 50 mg Tb24 Take 1 tablet (50 mg total) by mouth once daily.    multivit-min/iron/folic/lutein (CENTRUM SILVER WOMEN ORAL) Take by mouth.    nitrofurantoin (MACRODANTIN) 50 MG capsule Take 50 mg by mouth nightly.    pramipexole (MIRAPEX) 0.25 MG tablet SMARTSI Tablet(s) By Mouth Every Evening    predniSONE (DELTASONE) 1 MG tablet Take 1 mg by mouth 2 (two) times daily.    simvastatin (ZOCOR) 40 MG tablet Take 1 tablet (40 mg total) by mouth every evening.    cetirizine (ZYRTEC) 10 MG tablet Take 1 tablet (10 mg total) by mouth once daily. (Patient taking differently: Take 10 mg by mouth daily as needed.)    ID  NOW COVID-19 TEST KIT Kit TEST AS DIRECTED TODAY    triamcinolone acetonide 0.1% (KENALOG) 0.1 % cream Apply topically 2 (two) times daily.     Family History       Problem Relation (Age of Onset)    Cancer Mother, Maternal Aunt    Heart attack Father    Heart disease Father    Stroke Maternal Aunt          Tobacco Use    Smoking status: Never    Smokeless tobacco: Never   Substance and Sexual Activity    Alcohol use: Never    Drug use: Never    Sexual activity: Not Currently     Review of Systems   Constitutional:  Positive for activity change. Negative for chills and fever.   HENT:  Negative for ear pain, sinus pain, sore throat and trouble swallowing.         Mouth pain   Eyes:  Negative for pain and redness.   Respiratory:  Negative for cough, chest tightness, shortness of breath and wheezing.    Cardiovascular:  Negative for chest pain, palpitations and leg swelling.   Gastrointestinal:  Negative for abdominal pain, blood in stool, constipation, nausea and vomiting.   Genitourinary:  Negative for difficulty urinating and dysuria.   Musculoskeletal:  Positive for arthralgias, back pain, gait problem and myalgias.   Skin:  Negative for rash and wound.   Neurological:  Positive for weakness. Negative for seizures and syncope.   Psychiatric/Behavioral:  Negative for agitation, behavioral problems and confusion.    Objective:     Vital Signs (Most Recent):  Temp: 98.6 °F (37 °C) (11/10/22 1755)  Pulse: 73 (11/10/22 1755)  Resp: 18 (11/10/22 1755)  BP: (!) 175/79 (11/10/22 1755)  SpO2: 98 % (11/10/22 1755)   Vital Signs (24h Range):  Temp:  [97.7 °F (36.5 °C)-98.6 °F (37 °C)] 98.6 °F (37 °C)  Pulse:  [63-73] 73  Resp:  [18] 18  SpO2:  [98 %-100 %] 98 %  BP: (144-191)/(65-79) 175/79     Weight: 45.5 kg (100 lb 5 oz)  Body mass index is 16.19 kg/m².    Physical Exam  Constitutional:       Appearance: Normal appearance.      Comments: Thin female   HENT:      Nose: Nose normal.      Mouth/Throat:      Mouth: Mucous  membranes are moist.   Eyes:      Extraocular Movements: Extraocular movements intact.      Pupils: Pupils are equal, round, and reactive to light.   Cardiovascular:      Rate and Rhythm: Normal rate and regular rhythm.   Pulmonary:      Effort: Pulmonary effort is normal.      Breath sounds: Normal breath sounds.   Abdominal:      General: Bowel sounds are normal. There is no distension.      Palpations: Abdomen is soft.      Tenderness: There is no abdominal tenderness.   Musculoskeletal:      Cervical back: Normal range of motion and neck supple.      Comments: Arthritic changes to hands noted, kyphosis of upper back noted   Skin:     General: Skin is warm and dry.   Neurological:      General: No focal deficit present.      Mental Status: She is alert and oriented to person, place, and time.   Psychiatric:         Mood and Affect: Mood normal.         Behavior: Behavior normal.         CRANIAL NERVES     CN III, IV, VI   Pupils are equal, round, and reactive to light.     Significant Labs: All pertinent labs within the past 24 hours have been reviewed.  Recent Lab Results         11/10/22  0623        Albumin 2.5       Alkaline Phosphatase 66       ALT 24       Anion Gap 4       AST 20       Baso # 0.04       Basophil % 0.6       BILIRUBIN TOTAL 0.3  Comment: For infants and newborns, interpretation of results should be based  on gestational age, weight and in agreement with clinical  observations.    Premature Infant recommended reference ranges:  Up to 24 hours.............<8.0 mg/dL  Up to 48 hours............<12.0 mg/dL  3-5 days..................<15.0 mg/dL  6-29 days.................<15.0 mg/dL    For patients on Eltrombopag therapy, use of Dimension [place] TBIL is   not   recommended.         BUN 14       Calcium 9.1       Chloride 106       CO2 32       Creatinine 0.7       Differential Method Automated       eGFR >60.0       Eos # 0.5       Eosinophil % 7.7       Glucose 97       Gran # (ANC) 4.1        Gran % 63.4       Hematocrit 29.2       Hemoglobin 9.6       Immature Grans (Abs) 0.03  Comment: Mild elevation in immature granulocytes is non specific and   can be seen in a variety of conditions including stress response,   acute inflammation, trauma and pregnancy. Correlation with other   laboratory and clinical findings is essential.         Immature Granulocytes 0.5       Lymph # 1.1       Lymph % 16.6       MCH 31.8       MCHC 32.9       MCV 97       Mono # 0.7       Mono % 11.2       MPV 10.3       nRBC 0       Platelets 239       Potassium 4.2       PROTEIN TOTAL 6.0       RBC 3.02       RDW 12.0       Sodium 142       WBC 6.50               Significant Imaging: I have reviewed all pertinent imaging results/findings within the past 24 hours.    Assessment/Plan:     * Debility  ESTIMATED LENGTH OF STAY:  The patient's estimated length of stay is 7-10 days and she is expected to be able to be discharged to her home with family and home health.    REHABILITATION PLAN/GOALS  The patient is being admitted to a comprehensive acute inpatient rehabilitation program consisting of at least 3 hours of combined physical ( 1.5hrs./day, 5 days a week), and occupational therapy (1.5 hrs. A day, 5 days a week),speech therapy services if necessary, 24-hour skilled rehabilitation nursing care, and close supervision of a physician with special training and experience in rehabilitation medicine. Patient's prognosis for significant practical improvement within a reasonable period of time appears good . Given the patient's complex medical condition and risk of further medical complications, rehabilitation services could not be safely provided at a lower level of care such as a skilled nursing facility.                    1. Physical Therapy to Work on bed mobility   2. Occupational Therapy to ADL Retraining, Functional Transfer Training, Neuromuscular Re-education, Manual Therapy and Use of Adaptive Equipment and Retraining   3.  Speech Pathology to Evaluate and assist with dysphagia, Evaluate and Treat expressive and receptive language deficits and Evaluate and treat motor speech deficits   4. Specialized 24-hour Rehabilitation Nursing Care to Bowel and bladder training, Neurological checks and Wound care   5. Dietary to adjust to appropriate diet   6. Social Work/Case management to assist with discharge planning activities, acquisition of durable medical equipment, and ordering of follow up services as necessary.    The services provided in the acute medical rehab setting are under my supervision 24 hours a day. These services are necessary for this patient to achieve the most appropriate functional outcome and to determine the most appropriate discharge disposition.  I have reviewed the treatment plan with the patient and answered all of her questions, discussed goals and expectation.    REVIEW OF PRE-SCREEN ASSESSMENT  I have reviewed the patient's preadmission screen including her medical and functional status and compared it with her status upon arrival to the rehab unit and see no changes . Acute rehab services are still necessary and appropriate for this patient to achieve the best functional outcome while determining the most appropriate discharge disposition and services. The patient will require close medical management of multiple medical co-morbidities including UTI, arthritis, hypertension, hyperlipidemia and hypothyroidism .       Hyperlipidemia, mixed  Continue statin      Hypothyroid  Cont synthroid      Acute cystitis without hematuria  Urine culture showed Klebsiella sensitive to several p.o. antibiotics will DC IV Rocephin and start p.o. Cipro for patient      GERD (gastroesophageal reflux disease)  Continue famotodine    Arthritis  Followed by Rheumatology takes chronic prednisone and Plaquenil      Hypertension  Monitor bp = adjust meds if needed      VTE Risk Mitigation (From admission, onward)           Ordered     IP  VTE HIGH RISK PATIENT  Once         11/09/22 1318     Place sequential compression device  Until discontinued         11/09/22 1318     Place JEREMY hose  Until discontinued         11/09/22 1318                       Juliet Barclay MD  Department of Hospital Medicine   [facility])

## 2022-11-11 NOTE — SUBJECTIVE & OBJECTIVE
Past Medical History:   Diagnosis Date    Acute cystitis without hematuria 2021    Altered mental status 2021    Anemia 2021    Anxiety     Arthritis     Arthritis, rheumatoid     Bell's palsy     Bursitis of left shoulder     Carpal tunnel syndrome, bilateral     Debility 2021    Elevated troponin 2021    Erosive osteoarthritis of both hands     Esophagus disorder     needs to have her esophagus stretched again    Fall 2021    Function kidney decreased     GERD (gastroesophageal reflux disease)     High cholesterol     Hypertension     Hypokalemia 2021    Hypothyroidism     Kyphosis of cervical region, unspecified kyphosis type     Lumbar pain     Migraine headache     Postmenopausal disorder     Shingles     Thrombocytopenia     Thyroid disease     Unspecified cataract     Weakness 2021       Past Surgical History:   Procedure Laterality Date    BACK SURGERY      BLADDER SURGERY  2006    Dr Robert in [place] stem cell implant to bladder    CATARACT EXTRACTION Left     Dr Barroso 2016     SECTION      COLONOSCOPY  10/2012    Dr Corbett    ESOPHAGOGASTRODUODENOSCOPY  2021    schatzkis ring - dilated, gastroduodenitis, bile reflux - pickard    HYSTERECTOMY      NOSE SURGERY  2021    Dr De Souza in [place] removal of skin cancer    OOPHORECTOMY  1977    TONSILLECTOMY         Review of patient's allergies indicates:   Allergen Reactions    Ace inhibitors Anaphylaxis     cough    Belladonna alkaloids Anaphylaxis     Other reaction(s): Low Platelet Count    Librax (with clidinium) [chlordiazepoxide-clidinium] Anaphylaxis    Methotrexate analogues Anaphylaxis and Nausea And Vomiting    Codeine     Codeine sulfate      Other reaction(s): Rash, dizziness    Cymbalta [duloxetine] Hallucinations    Methotrexate sodium      Other reaction(s): wt loss, n/v    Solifenacin Hallucinations       No current facility-administered medications on file prior to encounter.      Current Outpatient Medications on File Prior to Encounter   Medication Sig    acetaminophen (TYLENOL) 325 MG tablet Take 2 tablets (650 mg total) by mouth every 8 (eight) hours as needed for Temperature greater than (or equal to 101 degree F).    cholecalciferol, vitamin D3, (VITAMIN D3) 50 mcg (2,000 unit) Cap Take 5,000 Units by mouth once daily. 5,000un qd    docusate sodium (COLACE) 100 MG capsule Take 100 mg by mouth nightly.    famotidine (PEPCID) 20 MG tablet Take 1 tablet (20 mg total) by mouth 2 (two) times daily.    hydrOXYchloroQUINE (PLAQUENIL) 200 mg tablet Take 200 mg by mouth every other day. Every other day    levothyroxine (SYNTHROID) 88 MCG tablet TAKE 1 TABLET BY MOUTH EVERY DAY    losartan (COZAAR) 50 MG tablet Take 1 tablet (50 mg total) by mouth once daily.    mirabegron (MYRBETRIQ) 50 mg Tb24 Take 1 tablet (50 mg total) by mouth once daily.    multivit-min/iron/folic/lutein (CENTRUM SILVER WOMEN ORAL) Take by mouth.    nitrofurantoin (MACRODANTIN) 50 MG capsule Take 50 mg by mouth nightly.    pramipexole (MIRAPEX) 0.25 MG tablet SMARTSI Tablet(s) By Mouth Every Evening    predniSONE (DELTASONE) 1 MG tablet Take 1 mg by mouth 2 (two) times daily.    simvastatin (ZOCOR) 40 MG tablet Take 1 tablet (40 mg total) by mouth every evening.    cetirizine (ZYRTEC) 10 MG tablet Take 1 tablet (10 mg total) by mouth once daily. (Patient taking differently: Take 10 mg by mouth daily as needed.)    ID NOW COVID-19 TEST KIT Kit TEST AS DIRECTED TODAY    triamcinolone acetonide 0.1% (KENALOG) 0.1 % cream Apply topically 2 (two) times daily.     Family History       Problem Relation (Age of Onset)    Cancer Mother, Maternal Aunt    Heart attack Father    Heart disease Father    Stroke Maternal Aunt          Tobacco Use    Smoking status: Never    Smokeless tobacco: Never   Substance and Sexual Activity    Alcohol use: Never    Drug use: Never    Sexual activity: Not Currently     Review of Systems    Constitutional:  Positive for activity change. Negative for chills and fever.   HENT:  Negative for ear pain, sinus pain, sore throat and trouble swallowing.         Mouth pain   Eyes:  Negative for pain and redness.   Respiratory:  Negative for cough, chest tightness, shortness of breath and wheezing.    Cardiovascular:  Negative for chest pain, palpitations and leg swelling.   Gastrointestinal:  Negative for abdominal pain, blood in stool, constipation, nausea and vomiting.   Genitourinary:  Negative for difficulty urinating and dysuria.   Musculoskeletal:  Positive for arthralgias, back pain, gait problem and myalgias.   Skin:  Negative for rash and wound.   Neurological:  Positive for weakness. Negative for seizures and syncope.   Psychiatric/Behavioral:  Negative for agitation, behavioral problems and confusion.    Objective:     Vital Signs (Most Recent):  Temp: 98.2 °F (36.8 °C) (11/11/22 0522)  Pulse: 74 (11/11/22 0832)  Resp: 18 (11/11/22 0522)  BP: (!) 162/72 (11/11/22 0835)  SpO2: 96 % (11/11/22 0522)   Vital Signs (24h Range):  Temp:  [98.2 °F (36.8 °C)-98.6 °F (37 °C)] 98.2 °F (36.8 °C)  Pulse:  [63-74] 74  Resp:  [18] 18  SpO2:  [96 %-98 %] 96 %  BP: (162-179)/(72-79) 162/72     Weight: 45.5 kg (100 lb 5 oz)  Body mass index is 16.19 kg/m².    Physical Exam  Constitutional:       Appearance: Normal appearance.      Comments: Thin female   HENT:      Nose: Nose normal.      Mouth/Throat:      Mouth: Mucous membranes are moist.   Eyes:      Extraocular Movements: Extraocular movements intact.      Pupils: Pupils are equal, round, and reactive to light.   Cardiovascular:      Rate and Rhythm: Normal rate and regular rhythm.   Pulmonary:      Effort: Pulmonary effort is normal.      Breath sounds: Normal breath sounds.   Abdominal:      General: Bowel sounds are normal. There is no distension.      Palpations: Abdomen is soft.      Tenderness: There is no abdominal tenderness.   Musculoskeletal:       Cervical back: Normal range of motion and neck supple.      Comments: Arthritic changes to hands noted, kyphosis of upper back noted   Skin:     General: Skin is warm and dry.   Neurological:      General: No focal deficit present.      Mental Status: She is alert and oriented to person, place, and time.   Psychiatric:         Mood and Affect: Mood normal.         Behavior: Behavior normal.         CRANIAL NERVES     CN III, IV, VI   Pupils are equal, round, and reactive to light.     Significant Labs: All pertinent labs within the past 24 hours have been reviewed.  Recent Lab Results       None            Significant Imaging: I have reviewed all pertinent imaging results/findings within the past 24 hours.

## 2022-11-11 NOTE — ASSESSMENT & PLAN NOTE
ESTIMATED LENGTH OF STAY:  The patient's estimated length of stay is 7-10 days and she is expected to be able to be discharged to her home with family and home health.    REHABILITATION PLAN/GOALS  The patient is being admitted to a comprehensive acute inpatient rehabilitation program consisting of at least 3 hours of combined physical ( 1.5hrs./day, 5 days a week), and occupational therapy (1.5 hrs. A day, 5 days a week),speech therapy services if necessary, 24-hour skilled rehabilitation nursing care, and close supervision of a physician with special training and experience in rehabilitation medicine. Patient's prognosis for significant practical improvement within a reasonable period of time appears good . Given the patient's complex medical condition and risk of further medical complications, rehabilitation services could not be safely provided at a lower level of care such as a skilled nursing facility.                    1. Physical Therapy to Work on bed mobility   2. Occupational Therapy to ADL Retraining, Functional Transfer Training, Neuromuscular Re-education, Manual Therapy and Use of Adaptive Equipment and Retraining   3. Speech Pathology to Evaluate and assist with dysphagia, Evaluate and Treat expressive and receptive language deficits and Evaluate and treat motor speech deficits   4. Specialized 24-hour Rehabilitation Nursing Care to Bowel and bladder training, Neurological checks and Wound care   5. Dietary to adjust to appropriate diet   6. Social Work/Case management to assist with discharge planning activities, acquisition of durable medical equipment, and ordering of follow up services as necessary.    The services provided in the acute medical rehab setting are under my supervision 24 hours a day. These services are necessary for this patient to achieve the most appropriate functional outcome and to determine the most appropriate discharge disposition.  I have reviewed the treatment plan with  the patient and answered all of her questions, discussed goals and expectation.    REVIEW OF PRE-SCREEN ASSESSMENT  I have reviewed the patient's preadmission screen including her medical and functional status and compared it with her status upon arrival to the rehab unit and see no changes . Acute rehab services are still necessary and appropriate for this patient to achieve the best functional outcome while determining the most appropriate discharge disposition and services. The patient will require close medical management of multiple medical co-morbidities including UTI, arthritis, hypertension, hyperlipidemia and hypothyroidism .

## 2022-11-11 NOTE — ASSESSMENT & PLAN NOTE
Urine culture showed Klebsiella sensitive to several p.o. antibiotics will DC IV Rocephin and start p.o. Cipro for patient

## 2022-11-11 NOTE — PT/OT/SLP PROGRESS
Physical Therapy         Treatment        Katarzyna Warner   MRN: [id number]     PT Received On: 11/11/22  PT Start Time: 1215     PT Stop Time: 1315    PT Total Time (min): 60 min       Billable Minutes:  Gait [id number], Therapeutic Activity 15, and Therapeutic Exercise 15  Total Minutes: 60    Treatment Type: Treatment  PT/PTA: PT             General Precautions: Standard,    Orthopedic Precautions:     Braces:           Subjective:  Communicated with nurse prior to session.         Objective:  Patient found in bed, with      Functional Mobility:  Bed Mobility:   Supine to sit: Modified Independent   Sit to supine: Modified Independent   Rolling: Modified Independent   Scooting: Modified Independent    Balance:   Static Sit: GOOD: Takes MODERATE challenges from all directions  Dynamic Sit:  GOOD: Maintains balance through MODERATE excursions of active trunk movement  Static Stand: FAIR+: Takes MINIMAL challenges from all directions  Dynamic stand: FAIR+: Needs CLOSE SUPERVISION during gait and is able to right self with minor LOB    Transfer Training:  Sit to stand:Supervision or Set-up Assistance with Rolling Walker    Bed <> Chair:  Step Transfer with Supervision or Set-up Assistance with Rolling Walker      Wheelchair Training:  Wc mobility ind 150ft    Gait Training:  Amb with  rw with stooped posture 200ft sba x 3 attempts    Stair Training:  Pt went up and down 12 steps with rails with sba      Additional Treatment:  Pt rode nustep x 10 min followed by standing with arms folded and marching in place to challenge her balance    Activity Tolerance:  Patient tolerated treatment well    Patient left supine with call button in reach.    Assessment:  Katarzyna Warner is a 87 y.o. female with a medical diagnosis of Debility. She presents with better trabsfers and bed mobility.    Rehab potential is good.    Activity tolerance: Good    Discharge recommendations:       Equipment recommendations:       GOALS:    Multidisciplinary Problems       Physical Therapy Goals          Problem: Physical Therapy    Goal Priority Disciplines Outcome Goal Variances Interventions   Physical Therapy Goal     PT, PT/OT      Description: Short term goals to be made 22    Patient will increase functional independence with mobility by performin. Supine to sit with SBA  2. Sit to supine with SBA  3. Sit to stand transfer with Stand-by Assistance  4. Bed to chair transfer with Supervision using Rolling Walker  5. Gait  x 150 feet with Supervision using Rolling Walker.   6. Ascend/descend 8 stair with bilateral Handrails supervision    Long term goals to be met by 22 .     1. Supine to sit with modified independent  2. Sit to supine with modified independent   3. Sit to stand transfer with modified independence  4. Bed to chair transfer with modified independence using Rolling Walker  5. Gait  x 150 feet with modified independence using Rolling Walker.   6. Ascend/descend 12 stair with bilateral Handrails modified independence                         PLAN:    Patient to be seen    to address the above listed problems via    Plan of Care expires:    Plan of Care reviewed with:           2022

## 2022-11-11 NOTE — PROGRESS NOTES
[facility]  Progress Note    Patient Name: Katarzyna Warner  MRN: [id number]  Patient Class: IP- Rehab   Admission Date: 11/9/2022  Length of Stay: 2 days  Attending Physician: Ayo Nolen III, MD  Primary Care Provider: Juliet Barclay MD        Subjective:     Principal Problem:Debility        HPI:    87-year-old female with history of anemia anxiety, chronic arthritis, hypertension and previous UTIs presented to the emergency department on 11/7/22 with nausea, frequent urination and generalized weakness.  Chest x-ray showed no acute changes vital signs were stable and patient was admitted for observation for IV antibiotics and fluids for acute cystitis without hematuria.  On 11/8 she developed a fever and was lethargic and chills and was very weak.  Therapy was started to help with the weakness and on 11/09 her acute medical needs were addressed and she was transferred to inpatient rehab for ongoing management of debility.  Prior to hospital admission, patient lived alone and was independent utilizing straight cane.  Currently patient is standby assistance for functional mobility and ambulating 80 ft with CGA with a rolling walker and Min to mod A for ADLs.  She had significant decline in functional ADLs and would benefit from multidisciplinary approach to regain strength, endurance and safety awareness.  She continues to need 24 hour medical management for medication maintenance and adjustments, lab monitoring, IV care, accurate I's and nose, monitoring of skin integrity, respiratory monitoring, monitoring glucose levels cognition and therapy so she can return alone to her prior level function.  At this time she will benefit from admission to acute rehab unit for continuation medical supervision by Internal Medicine.  In addition admission to acute rehab unit will add treatment by multidisciplinary team to improve functional status prior to  discharge.      Overview/Hospital Course:  22 DL:  Patient feeling well today.  Patient states she has been working well with therapy.  Denies pain.  Patient states she is eager to get stronger and return home.  Patient encouraged to continue therapy efforts.      Past Medical History:   Diagnosis Date    Acute cystitis without hematuria 2021    Altered mental status 2021    Anemia 2021    Anxiety     Arthritis     Arthritis, rheumatoid     Bell's palsy     Bursitis of left shoulder     Carpal tunnel syndrome, bilateral     Debility 2021    Elevated troponin 2021    Erosive osteoarthritis of both hands     Esophagus disorder     needs to have her esophagus stretched again    Fall 2021    Function kidney decreased     GERD (gastroesophageal reflux disease)     High cholesterol     Hypertension     Hypokalemia 2021    Hypothyroidism     Kyphosis of cervical region, unspecified kyphosis type     Lumbar pain     Migraine headache     Postmenopausal disorder     Shingles     Thrombocytopenia     Thyroid disease     Unspecified cataract     Weakness 2021       Past Surgical History:   Procedure Laterality Date    BACK SURGERY      BLADDER SURGERY  2006    Dr Robert in [place] stem cell implant to bladder    CATARACT EXTRACTION Left     Dr Barroso 2016     SECTION      COLONOSCOPY  10/2012    Dr Corbett    ESOPHAGOGASTRODUODENOSCOPY  2021    schatzkis ring - dilated, gastroduodenitis, bile reflux - pickard    HYSTERECTOMY      NOSE SURGERY  2021    Dr De Souza in [place] removal of skin cancer    OOPHORECTOMY  1977    TONSILLECTOMY         Review of patient's allergies indicates:   Allergen Reactions    Ace inhibitors Anaphylaxis     cough    Belladonna alkaloids Anaphylaxis     Other reaction(s): Low Platelet Count    Librax (with clidinium) [chlordiazepoxide-clidinium] Anaphylaxis    Methotrexate analogues  Anaphylaxis and Nausea And Vomiting    Codeine     Codeine sulfate      Other reaction(s): Rash, dizziness    Cymbalta [duloxetine] Hallucinations    Methotrexate sodium      Other reaction(s): wt loss, n/v    Solifenacin Hallucinations       No current facility-administered medications on file prior to encounter.     Current Outpatient Medications on File Prior to Encounter   Medication Sig    acetaminophen (TYLENOL) 325 MG tablet Take 2 tablets (650 mg total) by mouth every 8 (eight) hours as needed for Temperature greater than (or equal to 101 degree F).    cholecalciferol, vitamin D3, (VITAMIN D3) 50 mcg (2,000 unit) Cap Take 5,000 Units by mouth once daily. 5,000un qd    docusate sodium (COLACE) 100 MG capsule Take 100 mg by mouth nightly.    famotidine (PEPCID) 20 MG tablet Take 1 tablet (20 mg total) by mouth 2 (two) times daily.    hydrOXYchloroQUINE (PLAQUENIL) 200 mg tablet Take 200 mg by mouth every other day. Every other day    levothyroxine (SYNTHROID) 88 MCG tablet TAKE 1 TABLET BY MOUTH EVERY DAY    losartan (COZAAR) 50 MG tablet Take 1 tablet (50 mg total) by mouth once daily.    mirabegron (MYRBETRIQ) 50 mg Tb24 Take 1 tablet (50 mg total) by mouth once daily.    multivit-min/iron/folic/lutein (CENTRUM SILVER WOMEN ORAL) Take by mouth.    nitrofurantoin (MACRODANTIN) 50 MG capsule Take 50 mg by mouth nightly.    pramipexole (MIRAPEX) 0.25 MG tablet SMARTSI Tablet(s) By Mouth Every Evening    predniSONE (DELTASONE) 1 MG tablet Take 1 mg by mouth 2 (two) times daily.    simvastatin (ZOCOR) 40 MG tablet Take 1 tablet (40 mg total) by mouth every evening.    cetirizine (ZYRTEC) 10 MG tablet Take 1 tablet (10 mg total) by mouth once daily. (Patient taking differently: Take 10 mg by mouth daily as needed.)    ID NOW COVID-19 TEST KIT Kit TEST AS DIRECTED TODAY    triamcinolone acetonide 0.1% (KENALOG) 0.1 % cream Apply topically 2 (two) times daily.     Family History       Problem  Relation (Age of Onset)    Cancer Mother, Maternal Aunt    Heart attack Father    Heart disease Father    Stroke Maternal Aunt          Tobacco Use    Smoking status: Never    Smokeless tobacco: Never   Substance and Sexual Activity    Alcohol use: Never    Drug use: Never    Sexual activity: Not Currently     Review of Systems   Constitutional:  Positive for activity change. Negative for chills and fever.   HENT:  Negative for ear pain, sinus pain, sore throat and trouble swallowing.         Mouth pain   Eyes:  Negative for pain and redness.   Respiratory:  Negative for cough, chest tightness, shortness of breath and wheezing.    Cardiovascular:  Negative for chest pain, palpitations and leg swelling.   Gastrointestinal:  Negative for abdominal pain, blood in stool, constipation, nausea and vomiting.   Genitourinary:  Negative for difficulty urinating and dysuria.   Musculoskeletal:  Positive for arthralgias, back pain, gait problem and myalgias.   Skin:  Negative for rash and wound.   Neurological:  Positive for weakness. Negative for seizures and syncope.   Psychiatric/Behavioral:  Negative for agitation, behavioral problems and confusion.    Objective:     Vital Signs (Most Recent):  Temp: 98.2 °F (36.8 °C) (11/11/22 0522)  Pulse: 74 (11/11/22 0832)  Resp: 18 (11/11/22 0522)  BP: (!) 162/72 (11/11/22 0835)  SpO2: 96 % (11/11/22 0522)   Vital Signs (24h Range):  Temp:  [98.2 °F (36.8 °C)-98.6 °F (37 °C)] 98.2 °F (36.8 °C)  Pulse:  [63-74] 74  Resp:  [18] 18  SpO2:  [96 %-98 %] 96 %  BP: (162-179)/(72-79) 162/72     Weight: 45.5 kg (100 lb 5 oz)  Body mass index is 16.19 kg/m².    Physical Exam  Constitutional:       Appearance: Normal appearance.      Comments: Thin female   HENT:      Nose: Nose normal.      Mouth/Throat:      Mouth: Mucous membranes are moist.   Eyes:      Extraocular Movements: Extraocular movements intact.      Pupils: Pupils are equal, round, and reactive to light.   Cardiovascular:       Rate and Rhythm: Normal rate and regular rhythm.   Pulmonary:      Effort: Pulmonary effort is normal.      Breath sounds: Normal breath sounds.   Abdominal:      General: Bowel sounds are normal. There is no distension.      Palpations: Abdomen is soft.      Tenderness: There is no abdominal tenderness.   Musculoskeletal:      Cervical back: Normal range of motion and neck supple.      Comments: Arthritic changes to hands noted, kyphosis of upper back noted   Skin:     General: Skin is warm and dry.   Neurological:      General: No focal deficit present.      Mental Status: She is alert and oriented to person, place, and time.   Psychiatric:         Mood and Affect: Mood normal.         Behavior: Behavior normal.         CRANIAL NERVES     CN III, IV, VI   Pupils are equal, round, and reactive to light.     Significant Labs: All pertinent labs within the past 24 hours have been reviewed.  Recent Lab Results       None            Significant Imaging: I have reviewed all pertinent imaging results/findings within the past 24 hours.      Assessment/Plan:      * Debility  ESTIMATED LENGTH OF STAY:  The patient's estimated length of stay is 7-10 days and she is expected to be able to be discharged to her home with family and home health.    REHABILITATION PLAN/GOALS  The patient is being admitted to a comprehensive acute inpatient rehabilitation program consisting of at least 3 hours of combined physical ( 1.5hrs./day, 5 days a week), and occupational therapy (1.5 hrs. A day, 5 days a week),speech therapy services if necessary, 24-hour skilled rehabilitation nursing care, and close supervision of a physician with special training and experience in rehabilitation medicine. Patient's prognosis for significant practical improvement within a reasonable period of time appears good . Given the patient's complex medical condition and risk of further medical complications, rehabilitation services could not be safely provided at  a lower level of care such as a skilled nursing facility.                    1. Physical Therapy to Work on bed mobility   2. Occupational Therapy to ADL Retraining, Functional Transfer Training, Neuromuscular Re-education, Manual Therapy and Use of Adaptive Equipment and Retraining   3. Speech Pathology to Evaluate and assist with dysphagia, Evaluate and Treat expressive and receptive language deficits and Evaluate and treat motor speech deficits   4. Specialized 24-hour Rehabilitation Nursing Care to Bowel and bladder training, Neurological checks and Wound care   5. Dietary to adjust to appropriate diet   6. Social Work/Case management to assist with discharge planning activities, acquisition of durable medical equipment, and ordering of follow up services as necessary.    The services provided in the acute medical rehab setting are under my supervision 24 hours a day. These services are necessary for this patient to achieve the most appropriate functional outcome and to determine the most appropriate discharge disposition.  I have reviewed the treatment plan with the patient and answered all of her questions, discussed goals and expectation.    REVIEW OF PRE-SCREEN ASSESSMENT  I have reviewed the patient's preadmission screen including her medical and functional status and compared it with her status upon arrival to the rehab unit and see no changes . Acute rehab services are still necessary and appropriate for this patient to achieve the best functional outcome while determining the most appropriate discharge disposition and services. The patient will require close medical management of multiple medical co-morbidities including UTI, arthritis, hypertension, hyperlipidemia and hypothyroidism .       Hyperlipidemia, mixed  Continue statin      Hypothyroid  Cont synthroid      Acute cystitis without hematuria  Urine culture showed Klebsiella sensitive to several p.o. antibiotics will DC IV Rocephin and start p.o.  Cipro for patient      GERD (gastroesophageal reflux disease)  Continue famotodine    Arthritis  Followed by Rheumatology takes chronic prednisone and Plaquenil      Hypertension  Monitor bp = adjust meds if needed        VTE Risk Mitigation (From admission, onward)         Ordered     IP VTE HIGH RISK PATIENT  Once         11/09/22 1318     Place sequential compression device  Until discontinued         11/09/22 1318     Place JEREMY hose  Until discontinued         11/09/22 1318                Discharge Planning   REGINALDO: 11/9/2022     Code Status: DNR   Is the patient medically ready for discharge?:     Reason for patient still in hospital (select all that apply): Patient trending condition  Discharge Plan A: Home Health                  Olive Marie NP  Department of Hospital Medicine   [facility] ([facility])

## 2022-11-11 NOTE — HOSPITAL COURSE
11/11/22 DL:  Patient feeling well today.  Patient states she has been working well with therapy.  Denies pain.  Patient states she is eager to get stronger and return home.  Patient encouraged to continue therapy efforts.    11/14/22 DL:  Patient doing well today.  Patient states she has been working well with therapy and feels like she is getting stronger.  Patient encouraged to continue therapy efforts.  11/15 ND discussed at team metting - pt progressing to her goals  11/16/22 DL:  Patient ambulating in lin with walker.  Patient doing well with therapy.  States she is feeling stronger every day and is looking forward to going home possibly tomorrow.  Patient encouraged to continue therapy efforts  11/17 ND Pt doing well - made improvement in fxn in rehab - feelign better and able to care for herself at home with help of family and hh

## 2022-11-11 NOTE — PLAN OF CARE
Plan of care reviewed with patient. Educated on fall risk, preventing skin impairment, medications, and calling for needs. Verbalized understanding. Will cont to monitor

## 2022-11-11 NOTE — SUBJECTIVE & OBJECTIVE
Past Medical History:   Diagnosis Date    Acute cystitis without hematuria 2021    Altered mental status 2021    Anemia 2021    Anxiety     Arthritis     Arthritis, rheumatoid     Bell's palsy     Bursitis of left shoulder     Carpal tunnel syndrome, bilateral     Debility 2021    Elevated troponin 2021    Erosive osteoarthritis of both hands     Esophagus disorder     needs to have her esophagus stretched again    Fall 2021    Function kidney decreased     GERD (gastroesophageal reflux disease)     High cholesterol     Hypertension     Hypokalemia 2021    Hypothyroidism     Kyphosis of cervical region, unspecified kyphosis type     Lumbar pain     Migraine headache     Postmenopausal disorder     Shingles     Thrombocytopenia     Thyroid disease     Unspecified cataract     Weakness 2021       Past Surgical History:   Procedure Laterality Date    BACK SURGERY      BLADDER SURGERY  2006    Dr Robert in [place] stem cell implant to bladder    CATARACT EXTRACTION Left     Dr Barroso 2016     SECTION      COLONOSCOPY  10/2012    Dr Corbett    ESOPHAGOGASTRODUODENOSCOPY  2021    schatzkis ring - dilated, gastroduodenitis, bile reflux - pickard    HYSTERECTOMY      NOSE SURGERY  2021    Dr De Souza in [place] removal of skin cancer    OOPHORECTOMY  1977    TONSILLECTOMY         Review of patient's allergies indicates:   Allergen Reactions    Ace inhibitors Anaphylaxis     cough    Belladonna alkaloids Anaphylaxis     Other reaction(s): Low Platelet Count    Librax (with clidinium) [chlordiazepoxide-clidinium] Anaphylaxis    Methotrexate analogues Anaphylaxis and Nausea And Vomiting    Codeine     Codeine sulfate      Other reaction(s): Rash, dizziness    Cymbalta [duloxetine] Hallucinations    Methotrexate sodium      Other reaction(s): wt loss, n/v    Solifenacin Hallucinations       No current facility-administered medications on file prior to encounter.      Current Outpatient Medications on File Prior to Encounter   Medication Sig    acetaminophen (TYLENOL) 325 MG tablet Take 2 tablets (650 mg total) by mouth every 8 (eight) hours as needed for Temperature greater than (or equal to 101 degree F).    cholecalciferol, vitamin D3, (VITAMIN D3) 50 mcg (2,000 unit) Cap Take 5,000 Units by mouth once daily. 5,000un qd    docusate sodium (COLACE) 100 MG capsule Take 100 mg by mouth nightly.    famotidine (PEPCID) 20 MG tablet Take 1 tablet (20 mg total) by mouth 2 (two) times daily.    hydrOXYchloroQUINE (PLAQUENIL) 200 mg tablet Take 200 mg by mouth every other day. Every other day    levothyroxine (SYNTHROID) 88 MCG tablet TAKE 1 TABLET BY MOUTH EVERY DAY    losartan (COZAAR) 50 MG tablet Take 1 tablet (50 mg total) by mouth once daily.    mirabegron (MYRBETRIQ) 50 mg Tb24 Take 1 tablet (50 mg total) by mouth once daily.    multivit-min/iron/folic/lutein (CENTRUM SILVER WOMEN ORAL) Take by mouth.    nitrofurantoin (MACRODANTIN) 50 MG capsule Take 50 mg by mouth nightly.    pramipexole (MIRAPEX) 0.25 MG tablet SMARTSI Tablet(s) By Mouth Every Evening    predniSONE (DELTASONE) 1 MG tablet Take 1 mg by mouth 2 (two) times daily.    simvastatin (ZOCOR) 40 MG tablet Take 1 tablet (40 mg total) by mouth every evening.    cetirizine (ZYRTEC) 10 MG tablet Take 1 tablet (10 mg total) by mouth once daily. (Patient taking differently: Take 10 mg by mouth daily as needed.)    ID NOW COVID-19 TEST KIT Kit TEST AS DIRECTED TODAY    triamcinolone acetonide 0.1% (KENALOG) 0.1 % cream Apply topically 2 (two) times daily.     Family History       Problem Relation (Age of Onset)    Cancer Mother, Maternal Aunt    Heart attack Father    Heart disease Father    Stroke Maternal Aunt          Tobacco Use    Smoking status: Never    Smokeless tobacco: Never   Substance and Sexual Activity    Alcohol use: Never    Drug use: Never    Sexual activity: Not Currently     Review of Systems    Constitutional:  Positive for activity change. Negative for chills and fever.   HENT:  Negative for ear pain, sinus pain, sore throat and trouble swallowing.         Mouth pain   Eyes:  Negative for pain and redness.   Respiratory:  Negative for cough, chest tightness, shortness of breath and wheezing.    Cardiovascular:  Negative for chest pain, palpitations and leg swelling.   Gastrointestinal:  Negative for abdominal pain, blood in stool, constipation, nausea and vomiting.   Genitourinary:  Negative for difficulty urinating and dysuria.   Musculoskeletal:  Positive for arthralgias, back pain, gait problem and myalgias.   Skin:  Negative for rash and wound.   Neurological:  Positive for weakness. Negative for seizures and syncope.   Psychiatric/Behavioral:  Negative for agitation, behavioral problems and confusion.    Objective:     Vital Signs (Most Recent):  Temp: 98.6 °F (37 °C) (11/10/22 1755)  Pulse: 73 (11/10/22 1755)  Resp: 18 (11/10/22 1755)  BP: (!) 175/79 (11/10/22 1755)  SpO2: 98 % (11/10/22 1755)   Vital Signs (24h Range):  Temp:  [97.7 °F (36.5 °C)-98.6 °F (37 °C)] 98.6 °F (37 °C)  Pulse:  [63-73] 73  Resp:  [18] 18  SpO2:  [98 %-100 %] 98 %  BP: (144-191)/(65-79) 175/79     Weight: 45.5 kg (100 lb 5 oz)  Body mass index is 16.19 kg/m².    Physical Exam  Constitutional:       Appearance: Normal appearance.      Comments: Thin female   HENT:      Nose: Nose normal.      Mouth/Throat:      Mouth: Mucous membranes are moist.   Eyes:      Extraocular Movements: Extraocular movements intact.      Pupils: Pupils are equal, round, and reactive to light.   Cardiovascular:      Rate and Rhythm: Normal rate and regular rhythm.   Pulmonary:      Effort: Pulmonary effort is normal.      Breath sounds: Normal breath sounds.   Abdominal:      General: Bowel sounds are normal. There is no distension.      Palpations: Abdomen is soft.      Tenderness: There is no abdominal tenderness.   Musculoskeletal:       Cervical back: Normal range of motion and neck supple.      Comments: Arthritic changes to hands noted, kyphosis of upper back noted   Skin:     General: Skin is warm and dry.   Neurological:      General: No focal deficit present.      Mental Status: She is alert and oriented to person, place, and time.   Psychiatric:         Mood and Affect: Mood normal.         Behavior: Behavior normal.         CRANIAL NERVES     CN III, IV, VI   Pupils are equal, round, and reactive to light.     Significant Labs: All pertinent labs within the past 24 hours have been reviewed.  Recent Lab Results         11/10/22  0623        Albumin 2.5       Alkaline Phosphatase 66       ALT 24       Anion Gap 4       AST 20       Baso # 0.04       Basophil % 0.6       BILIRUBIN TOTAL 0.3  Comment: For infants and newborns, interpretation of results should be based  on gestational age, weight and in agreement with clinical  observations.    Premature Infant recommended reference ranges:  Up to 24 hours.............<8.0 mg/dL  Up to 48 hours............<12.0 mg/dL  3-5 days..................<15.0 mg/dL  6-29 days.................<15.0 mg/dL    For patients on Eltrombopag therapy, use of Dimension [place] TBIL is   not   recommended.         BUN 14       Calcium 9.1       Chloride 106       CO2 32       Creatinine 0.7       Differential Method Automated       eGFR >60.0       Eos # 0.5       Eosinophil % 7.7       Glucose 97       Gran # (ANC) 4.1       Gran % 63.4       Hematocrit 29.2       Hemoglobin 9.6       Immature Grans (Abs) 0.03  Comment: Mild elevation in immature granulocytes is non specific and   can be seen in a variety of conditions including stress response,   acute inflammation, trauma and pregnancy. Correlation with other   laboratory and clinical findings is essential.         Immature Granulocytes 0.5       Lymph # 1.1       Lymph % 16.6       MCH 31.8       MCHC 32.9       MCV 97       Mono # 0.7       Mono % 11.2       MPV  10.3       nRBC 0       Platelets 239       Potassium 4.2       PROTEIN TOTAL 6.0       RBC 3.02       RDW 12.0       Sodium 142       WBC 6.50               Significant Imaging: I have reviewed all pertinent imaging results/findings within the past 24 hours.

## 2022-11-11 NOTE — HPI
87-year-old female with history of anemia anxiety, chronic arthritis, hypertension and previous UTIs presented to the emergency department on 11/7/22 with nausea, frequent urination and generalized weakness.  Chest x-ray showed no acute changes vital signs were stable and patient was admitted for observation for IV antibiotics and fluids for acute cystitis without hematuria.  On 11/8 she developed a fever and was lethargic and chills and was very weak.  Therapy was started to help with the weakness and on 11/09 her acute medical needs were addressed and she was transferred to inpatient rehab for ongoing management of debility.  Prior to hospital admission, patient lived alone and was independent utilizing straight cane.  Currently patient is standby assistance for functional mobility and ambulating 80 ft with CGA with a rolling walker and Min to mod A for ADLs.  She had significant decline in functional ADLs and would benefit from multidisciplinary approach to regain strength, endurance and safety awareness.  She continues to need 24 hour medical management for medication maintenance and adjustments, lab monitoring, IV care, accurate I's and nose, monitoring of skin integrity, respiratory monitoring, monitoring glucose levels cognition and therapy so she can return alone to her prior level function.  At this time she will benefit from admission to acute rehab unit for continuation medical supervision by Internal Medicine.  In addition admission to acute rehab unit will add treatment by multidisciplinary team to improve functional status prior to discharge.

## 2022-11-11 NOTE — PT/OT/SLP PROGRESS
Occupational Therapy  Treatment    Katarzyna Warner   MRN: [id number]   Admitting Diagnosis: Debility    OT Date of Treatment: 11/11/22   OT Start Time: 0915  OT Stop Time: 1115  OT Total Time (min): 120 min    Treatment Type: Individual 90 and Concurrent 30     Billable Minutes:  Self Care/Home Management 30, Therapeutic Activity 30, and Therapeutic Exercise 60  Total Minutes: 120     OT/TAL: OT          General Precautions: Standard, fall  Orthopedic Precautions: N/A  Braces:      Spiritual, Cultural Beliefs, Jehovah's witness Practices, Values that Affect Care: no    Subjective:  Communicated with nurse prior to session.    Pain/Comfort  Pain Rating 1: 6/10  Location - Orientation 1: lower  Location 1: back  Pain Addressed 1: Reposition, Cessation of Activity, Nurse notified  Pain Rating Post-Intervention 1: 3/10    Objective:  Pt was cooperative and motivated with minimal verbal encouragement while exhibiting positive affect. She participated in functional transfer retraining throughout session to / from bed, chair, and toilet emphasizing fall prevention providing extra time with repetition requiring min assist secondary to steadying assist with mod verbal and tactile cueing for posture, safety awareness, and technique with use of AD. Pt then participated in ADL retraining regarding toileting emphasizing fall prevention providing extra time requiring SBA secondary to safety concerns during clothing management with verbal and tactile cueing for safety awareness and technique. Next, she participated in therapeutic activity addressing trunk mobility, trunk control, dynamic sitting balance, trunk strength, and stretching of bilateral shoulders to end range with flexion and abduction utilizing large stability ball challenging her to perform trunk flexion, extension, and lateral flexion with bilateral hands stabilized on ball requiring verbal and tactile cueing to facilitate optimal movement patterns and increased range per  trial in order to improve active ROM impacting performance with functional tasks below waist. Pt then participated in therapeutic exercise performing 5x12 seated pushups requiring verbal and tactile cueing for technique challenging her to lift buttocks off seated surface to end range elbow extension in order to strengthen triceps brachii to improve performance with sit <> stand transitions particularly from lower surfaces. Also, she participated in therapeutic exercise performing 3x12 bilateral UE strengthening exercises utilizing thin to moderate resistance theraband with scapular retraction, shoulder extension, shoulder adduction, horizontal abduction, shoulder flexion in plane of scaption, internal rotation, external rotation, and elbow flexion requiring mod verbal and tactile cueing for technique emphasizing quality of movement.     Functional Mobility:  Bed Mobility:   Supine to sit: Standby Assistance   Sit to supine: Standby Assistance   Rolling: Standby Assistance   Scooting: Standby Assistance    Transfer Training:   Sit to stand:Contact Guard Assistance with Rolling Walker .  Bed <> Chair:  Step Transfer with Minimal Assistance with Rolling Walker .  Toilet Transfer:  Pt Step Transfer with Minimal Assistance with Grab bars .    Toilet Training:  Pt performed toileting with Stand-by Assistance with Grab  bar at [employer].    Balance:   Static Sit: GOOD: Takes MODERATE challenges from all directions  Dynamic Sit:  FAIR+: Maintains balance through MINIMAL excursions of active trunk motion  Static Stand: FAIR+: Takes MINIMAL challenges from all directions  Dynamic stand: FAIR: Needs CONTACT GUARD during gait      Patient left sitting edge of bed with call button in reach and nurse notified    ASSESSMENT:  Pt demonstrated improved functional performance with toileting as noted by SBA in which patient able to perform perineal hygiene and clothing management without steadying assist on this date impacted by  improved static standing balance.     Rehab potential is good    Activity tolerance: Fair    Discharge recommendations: other (see comments) (To be further determined based on progress prior to discharge.)     Equipment recommendations: none     GOALS:   Multidisciplinary Problems       Occupational Therapy Goals          Problem: Occupational Therapy    Goal Priority Disciplines Outcome Interventions   Occupational Therapy Goal     OT, PT/OT     Description: Long Term Goals to be met by: 11/23/22     Patient will increase functional independence with ADLs by performing:    Feeding with [place].  UE Dressing with Modified [place].  LE Dressing with Modified [place].  Grooming while standing at sink with Modified [place].  Toileting from toilet with Modified [place] for hygiene and clothing management.   Bathing from  shower chair/bench with Modified [place].  Step transfer with Modified [place]  Toilet transfer to toilet with Modified [place].  Increased functional strength to 4+/5 for bilateral UE's.                         Plan:  Patient to be seen 5 x/week (90 min per day, for 14 days) to address the above listed problems via self-care/home management, community/work re-entry, therapeutic activities, therapeutic exercises  Plan of Care expires: 11/23/22  Plan of Care reviewed with: patient         11/11/2022

## 2022-11-12 PROCEDURE — 11800000 HC REHAB PRIVATE ROOM

## 2022-11-12 PROCEDURE — 63600175 PHARM REV CODE 636 W HCPCS: Performed by: INTERNAL MEDICINE

## 2022-11-12 PROCEDURE — 25000003 PHARM REV CODE 250: Performed by: INTERNAL MEDICINE

## 2022-11-12 PROCEDURE — 97530 THERAPEUTIC ACTIVITIES: CPT

## 2022-11-12 RX ORDER — LOSARTAN POTASSIUM 50 MG/1
50 TABLET ORAL ONCE
Status: COMPLETED | OUTPATIENT
Start: 2022-11-12 | End: 2022-11-12

## 2022-11-12 RX ORDER — LOSARTAN POTASSIUM 50 MG/1
100 TABLET ORAL DAILY
Status: DISCONTINUED | OUTPATIENT
Start: 2022-11-13 | End: 2022-11-17 | Stop reason: HOSPADM

## 2022-11-12 RX ORDER — HYDRALAZINE HYDROCHLORIDE 25 MG/1
25 TABLET, FILM COATED ORAL EVERY 8 HOURS PRN
Status: DISCONTINUED | OUTPATIENT
Start: 2022-11-12 | End: 2022-11-17 | Stop reason: HOSPADM

## 2022-11-12 RX ADMIN — ALUMINUM HYDROXIDE, MAGNESIUM HYDROXIDE, AND DIMETHICONE 15 ML: 200; 20; 200 SUSPENSION ORAL at 02:11

## 2022-11-12 RX ADMIN — LOSARTAN POTASSIUM 50 MG: 50 TABLET, FILM COATED ORAL at 01:11

## 2022-11-12 RX ADMIN — PREDNISONE 2 MG: 1 TABLET ORAL at 09:11

## 2022-11-12 RX ADMIN — LOSARTAN POTASSIUM 50 MG: 50 TABLET, FILM COATED ORAL at 09:11

## 2022-11-12 RX ADMIN — Medication 6 MG: at 08:11

## 2022-11-12 RX ADMIN — ALUMINUM HYDROXIDE, MAGNESIUM HYDROXIDE, AND DIMETHICONE 15 ML: 200; 20; 200 SUSPENSION ORAL at 09:11

## 2022-11-12 RX ADMIN — MIRABEGRON 50 MG: 50 TABLET, FILM COATED, EXTENDED RELEASE ORAL at 09:11

## 2022-11-12 RX ADMIN — LEVOTHYROXINE SODIUM 88 MCG: 88 TABLET ORAL at 05:11

## 2022-11-12 RX ADMIN — FAMOTIDINE 20 MG: 20 TABLET, FILM COATED ORAL at 09:11

## 2022-11-12 RX ADMIN — ATORVASTATIN CALCIUM 40 MG: 40 TABLET, FILM COATED ORAL at 09:11

## 2022-11-12 RX ADMIN — DOCUSATE SODIUM 100 MG: 100 CAPSULE, LIQUID FILLED ORAL at 08:11

## 2022-11-12 RX ADMIN — HYDROXYCHLOROQUINE SULFATE 200 MG: 200 TABLET ORAL at 09:11

## 2022-11-12 RX ADMIN — CIPROFLOXACIN 250 MG: 250 TABLET, COATED ORAL at 09:11

## 2022-11-12 RX ADMIN — PRAMIPEXOLE DIHYDROCHLORIDE 0.25 MG: 0.12 TABLET ORAL at 08:11

## 2022-11-12 RX ADMIN — ALUMINUM HYDROXIDE, MAGNESIUM HYDROXIDE, AND DIMETHICONE: 200; 20; 200 SUSPENSION ORAL at 08:11

## 2022-11-12 RX ADMIN — CIPROFLOXACIN 250 MG: 250 TABLET, COATED ORAL at 08:11

## 2022-11-12 NOTE — PT/OT/SLP PROGRESS
Occupational Therapy  Treatment    Katarzyna Warner   MRN: [id number]   Admitting Diagnosis: Debility    OT Date of Treatment: 11/12/22   OT Start Time: 1445  OT Stop Time: 1545  OT Total Time (min): 60 min    Treatment Type: Individual 30 and Concurrent 30     Billable Minutes:  Therapeutic Activity 60  Total Minutes: 60     OT/TAL: OT          General Precautions: Standard, fall  Orthopedic Precautions: N/A  Braces:      Spiritual, Cultural Beliefs, Restorationist Practices, Values that Affect Care: no    Subjective:  Communicated with nurse prior to session.    Pain/Comfort  Pain Rating 1: 4/10  Location - Side 1: Bilateral  Location 1: knee  Pain Addressed 1: Reposition, Cessation of Activity  Pain Rating Post-Intervention 1: 2/10    Objective:  Pt was cooperative and motivated with minimal verbal encouragement while exhibiting positive affect. She participated in functional transfer retraining throughout session to / from bed, chair, and toilet emphasizing fall prevention providing extra time with repetition requiring supervision secondary to safety concerns with mod verbal cueing for posture, safety awareness, and technique with use of AD. Pt then participated in therapeutic activity addressing functional reaching, gross motor coordination, static / dynamic standing balance, standing tolerance, and energy for task / endurance challenging her to reach in multiple planes utilizing bilateral UE's to retrieve, lift, stabilize, manipulate, and place various items needed to perform functional tasks of ADL's requiring mod verbal and tactile cueing to facilitate optimal movement patterns, proper standing posture, appropriate weight shifting and positioning within RW while utilizing forward, lateral, and diagonal steps with CGA for safety.      Functional Mobility:  Transfer Training:   Sit to stand:Supervision with Rolling Walker .  Bed <> Chair:  Step Transfer with Supervision with Rolling Walker .  Toilet Transfer:  Pt  Step Transfer with Supervision with Grab bars .    Balance:   Static Sit: GOOD: Takes MODERATE challenges from all directions  Dynamic Sit:  GOOD-: Incosistently Maintains balance through MODERATE excursions of active trunk movement,     Static Stand: FAIR+: Takes MINIMAL challenges from all directions  Dynamic stand: FAIR+: Needs CLOSE SUPERVISION during gait and is able to right self with minor LOB    Patient left HOB elevated with call button in reach and nurse notified    ASSESSMENT:  Pt demonstrated improved functional performance with transfers as noted by supervision in which patient did not require steadying assist on this date with use of assistive device including RW and grab bars.    Rehab potential is good    Activity tolerance: Good    Discharge recommendations: other (see comments) (To be further determined based on progress prior to discharge.)     Equipment recommendations: none     GOALS:   Multidisciplinary Problems       Occupational Therapy Goals          Problem: Occupational Therapy    Goal Priority Disciplines Outcome Interventions   Occupational Therapy Goal     OT, PT/OT     Description: Long Term Goals to be met by: 11/23/22     Patient will increase functional independence with ADLs by performing:    Feeding with [place].  UE Dressing with Modified [place].  LE Dressing with Modified [place].  Grooming while standing at sink with Modified [place].  Toileting from toilet with Modified [place] for hygiene and clothing management.   Bathing from  shower chair/bench with Modified [place].  Step transfer with Modified [place]  Toilet transfer to toilet with Modified [place].  Increased functional strength to 4+/5 for bilateral UE's.                         Plan:  Patient to be seen 5 x/week (90 min per day, for 14 days) to address the above listed problems via self-care/home management, community/work re-entry, therapeutic activities, therapeutic  exercises  Plan of Care expires: 11/23/22  Plan of Care reviewed with: patient         11/12/2022

## 2022-11-12 NOTE — PLAN OF CARE
Problem: Rehabilitation (IRF) Plan of Care  Goal: Plan of Care Review  Outcome: Ongoing, Progressing  Goal: Patient-Specific Goal (Individualized)  Outcome: Ongoing, Progressing  Goal: Absence of New-Onset Illness or Injury  Outcome: Ongoing, Progressing  Goal: Optimal Comfort and Wellbeing  Outcome: Ongoing, Progressing  Goal: Readiness for Transition of Care  Outcome: Ongoing, Progressing     Problem: Fall Injury Risk  Goal: Absence of Fall and Fall-Related Injury  Outcome: Ongoing, Progressing     Problem: Mobility Impairment  Goal: Optimal Mobility  Outcome: Ongoing, Progressing     Problem: Infection  Goal: Absence of Infection Signs and Symptoms  Outcome: Ongoing, Progressing     Problem: Skin Injury Risk Increased  Goal: Skin Health and Integrity  Outcome: Ongoing, Progressing   Will continue to monitor and will maintain a safe environment.

## 2022-11-13 LAB
ALBUMIN SERPL BCP-MCNC: 2.9 G/DL (ref 3.5–5.2)
ALP SERPL-CCNC: 66 U/L (ref 55–135)
ALT SERPL W/O P-5'-P-CCNC: 35 U/L (ref 10–44)
ANION GAP SERPL CALC-SCNC: 1 MMOL/L (ref 8–16)
AST SERPL-CCNC: 34 U/L (ref 10–40)
BASOPHILS # BLD AUTO: 0.05 K/UL (ref 0–0.2)
BASOPHILS NFR BLD: 0.7 % (ref 0–1.9)
BILIRUB SERPL-MCNC: 0.3 MG/DL (ref 0.1–1)
BUN SERPL-MCNC: 16 MG/DL (ref 8–23)
CALCIUM SERPL-MCNC: 9.5 MG/DL (ref 8.7–10.5)
CHLORIDE SERPL-SCNC: 104 MMOL/L (ref 95–110)
CO2 SERPL-SCNC: 35 MMOL/L (ref 23–29)
CREAT SERPL-MCNC: 0.9 MG/DL (ref 0.5–1.4)
DIFFERENTIAL METHOD: ABNORMAL
EOSINOPHIL # BLD AUTO: 0.3 K/UL (ref 0–0.5)
EOSINOPHIL NFR BLD: 4.6 % (ref 0–8)
ERYTHROCYTE [DISTWIDTH] IN BLOOD BY AUTOMATED COUNT: 11.9 % (ref 11.5–14.5)
EST. GFR  (NO RACE VARIABLE): >60 ML/MIN/1.73 M^2
GLUCOSE SERPL-MCNC: 90 MG/DL (ref 70–110)
HCT VFR BLD AUTO: 30.7 % (ref 37–48.5)
HGB BLD-MCNC: 10.2 G/DL (ref 12–16)
IMM GRANULOCYTES # BLD AUTO: 0.05 K/UL (ref 0–0.04)
IMM GRANULOCYTES NFR BLD AUTO: 0.7 % (ref 0–0.5)
LYMPHOCYTES # BLD AUTO: 1.5 K/UL (ref 1–4.8)
LYMPHOCYTES NFR BLD: 21.3 % (ref 18–48)
MCH RBC QN AUTO: 31.9 PG (ref 27–31)
MCHC RBC AUTO-ENTMCNC: 33.2 G/DL (ref 32–36)
MCV RBC AUTO: 96 FL (ref 82–98)
MONOCYTES # BLD AUTO: 0.7 K/UL (ref 0.3–1)
MONOCYTES NFR BLD: 10 % (ref 4–15)
NEUTROPHILS # BLD AUTO: 4.3 K/UL (ref 1.8–7.7)
NEUTROPHILS NFR BLD: 62.7 % (ref 38–73)
NRBC BLD-RTO: 0 /100 WBC
PLATELET # BLD AUTO: 344 K/UL (ref 150–450)
PMV BLD AUTO: 9.6 FL (ref 9.2–12.9)
POTASSIUM SERPL-SCNC: 3.8 MMOL/L (ref 3.5–5.1)
PROT SERPL-MCNC: 6.5 G/DL (ref 6–8.4)
RBC # BLD AUTO: 3.2 M/UL (ref 4–5.4)
SODIUM SERPL-SCNC: 140 MMOL/L (ref 136–145)
WBC # BLD AUTO: 6.8 K/UL (ref 3.9–12.7)

## 2022-11-13 PROCEDURE — 25000003 PHARM REV CODE 250: Performed by: INTERNAL MEDICINE

## 2022-11-13 PROCEDURE — 63600175 PHARM REV CODE 636 W HCPCS: Performed by: INTERNAL MEDICINE

## 2022-11-13 PROCEDURE — 36415 COLL VENOUS BLD VENIPUNCTURE: CPT | Performed by: INTERNAL MEDICINE

## 2022-11-13 PROCEDURE — 11800000 HC REHAB PRIVATE ROOM

## 2022-11-13 PROCEDURE — 85025 COMPLETE CBC W/AUTO DIFF WBC: CPT | Performed by: INTERNAL MEDICINE

## 2022-11-13 PROCEDURE — 80053 COMPREHEN METABOLIC PANEL: CPT | Performed by: INTERNAL MEDICINE

## 2022-11-13 RX ADMIN — ALUMINUM HYDROXIDE, MAGNESIUM HYDROXIDE, AND DIMETHICONE 15 ML: 200; 20; 200 SUSPENSION ORAL at 08:11

## 2022-11-13 RX ADMIN — CIPROFLOXACIN 250 MG: 250 TABLET, COATED ORAL at 08:11

## 2022-11-13 RX ADMIN — PREDNISONE 2 MG: 1 TABLET ORAL at 08:11

## 2022-11-13 RX ADMIN — DOCUSATE SODIUM 100 MG: 100 CAPSULE, LIQUID FILLED ORAL at 08:11

## 2022-11-13 RX ADMIN — ALUMINUM HYDROXIDE, MAGNESIUM HYDROXIDE, AND DIMETHICONE 15 ML: 200; 20; 200 SUSPENSION ORAL at 02:11

## 2022-11-13 RX ADMIN — FAMOTIDINE 20 MG: 20 TABLET, FILM COATED ORAL at 08:11

## 2022-11-13 RX ADMIN — ATORVASTATIN CALCIUM 40 MG: 40 TABLET, FILM COATED ORAL at 08:11

## 2022-11-13 RX ADMIN — PRAMIPEXOLE DIHYDROCHLORIDE 0.25 MG: 0.12 TABLET ORAL at 08:11

## 2022-11-13 RX ADMIN — MIRABEGRON 50 MG: 50 TABLET, FILM COATED, EXTENDED RELEASE ORAL at 08:11

## 2022-11-13 RX ADMIN — LOSARTAN POTASSIUM 100 MG: 50 TABLET, FILM COATED ORAL at 08:11

## 2022-11-13 RX ADMIN — LEVOTHYROXINE SODIUM 88 MCG: 88 TABLET ORAL at 05:11

## 2022-11-13 NOTE — PLAN OF CARE
Problem: Rehabilitation (IRF) Plan of Care  Goal: Plan of Care Review  Outcome: Ongoing, Progressing  Goal: Patient-Specific Goal (Individualized)  Outcome: Ongoing, Progressing  Goal: Absence of New-Onset Illness or Injury  Outcome: Ongoing, Progressing  Goal: Optimal Comfort and Wellbeing  Outcome: Ongoing, Progressing  Goal: Readiness for Transition of Care  Outcome: Ongoing, Progressing     Problem: Fall Injury Risk  Goal: Absence of Fall and Fall-Related Injury  Outcome: Ongoing, Progressing     Problem: Infection  Goal: Absence of Infection Signs and Symptoms  Outcome: Ongoing, Progressing     Problem: Skin Injury Risk Increased  Goal: Skin Health and Integrity  Outcome: Ongoing, Progressing     Problem: Violence Risk or Actual  Goal: Anger and Impulse Control  Outcome: Ongoing, Progressing   Will continue to monitor and to provide a safe environment.

## 2022-11-13 NOTE — PLAN OF CARE
Problem: Rehabilitation (IRF) Plan of Care  Goal: Plan of Care Review  Outcome: Ongoing, Progressing  Goal: Patient-Specific Goal (Individualized)  Outcome: Ongoing, Progressing  Goal: Absence of New-Onset Illness or Injury  Outcome: Ongoing, Progressing  Goal: Optimal Comfort and Wellbeing  Outcome: Ongoing, Progressing  Goal: Readiness for Transition of Care  Outcome: Ongoing, Progressing     Problem: Fall Injury Risk  Goal: Absence of Fall and Fall-Related Injury  Outcome: Ongoing, Progressing     Problem: Mobility Impairment  Goal: Optimal Mobility  Outcome: Ongoing, Progressing     Problem: Infection  Goal: Absence of Infection Signs and Symptoms  Outcome: Ongoing, Progressing     Problem: Skin Injury Risk Increased  Goal: Skin Health and Integrity  Outcome: Ongoing, Progressing     Problem: Pain Chronic (Persistent)  Goal: Acceptable Pain Control and Functional Ability  Outcome: Ongoing, Progressing     Problem: Violence Risk or Actual  Goal: Anger and Impulse Control  Outcome: Ongoing, Progressing

## 2022-11-14 PROCEDURE — 97110 THERAPEUTIC EXERCISES: CPT

## 2022-11-14 PROCEDURE — 63600175 PHARM REV CODE 636 W HCPCS: Performed by: INTERNAL MEDICINE

## 2022-11-14 PROCEDURE — 11800000 HC REHAB PRIVATE ROOM

## 2022-11-14 PROCEDURE — 25000003 PHARM REV CODE 250: Performed by: INTERNAL MEDICINE

## 2022-11-14 PROCEDURE — 97530 THERAPEUTIC ACTIVITIES: CPT

## 2022-11-14 PROCEDURE — 97116 GAIT TRAINING THERAPY: CPT

## 2022-11-14 PROCEDURE — 97535 SELF CARE MNGMENT TRAINING: CPT

## 2022-11-14 RX ADMIN — HYDRALAZINE HYDROCHLORIDE 25 MG: 25 TABLET ORAL at 04:11

## 2022-11-14 RX ADMIN — FAMOTIDINE 20 MG: 20 TABLET, FILM COATED ORAL at 08:11

## 2022-11-14 RX ADMIN — CIPROFLOXACIN 250 MG: 250 TABLET, COATED ORAL at 08:11

## 2022-11-14 RX ADMIN — Medication 6 MG: at 12:11

## 2022-11-14 RX ADMIN — MIRABEGRON 50 MG: 50 TABLET, FILM COATED, EXTENDED RELEASE ORAL at 08:11

## 2022-11-14 RX ADMIN — ATORVASTATIN CALCIUM 40 MG: 40 TABLET, FILM COATED ORAL at 08:11

## 2022-11-14 RX ADMIN — DOCUSATE SODIUM 100 MG: 100 CAPSULE, LIQUID FILLED ORAL at 08:11

## 2022-11-14 RX ADMIN — LOSARTAN POTASSIUM 100 MG: 50 TABLET, FILM COATED ORAL at 08:11

## 2022-11-14 RX ADMIN — PRAMIPEXOLE DIHYDROCHLORIDE 0.25 MG: 0.12 TABLET ORAL at 08:11

## 2022-11-14 RX ADMIN — LEVOTHYROXINE SODIUM 88 MCG: 88 TABLET ORAL at 05:11

## 2022-11-14 RX ADMIN — ACETAMINOPHEN 650 MG: 325 TABLET ORAL at 12:11

## 2022-11-14 RX ADMIN — PREDNISONE 2 MG: 1 TABLET ORAL at 08:11

## 2022-11-14 RX ADMIN — ACETAMINOPHEN 650 MG: 325 TABLET ORAL at 09:11

## 2022-11-14 RX ADMIN — HYDROXYCHLOROQUINE SULFATE 200 MG: 200 TABLET ORAL at 08:11

## 2022-11-14 NOTE — SUBJECTIVE & OBJECTIVE
Past Medical History:   Diagnosis Date    Acute cystitis without hematuria 2021    Altered mental status 2021    Anemia 2021    Anxiety     Arthritis     Arthritis, rheumatoid     Bell's palsy     Bursitis of left shoulder     Carpal tunnel syndrome, bilateral     Debility 2021    Elevated troponin 2021    Erosive osteoarthritis of both hands     Esophagus disorder     needs to have her esophagus stretched again    Fall 2021    Function kidney decreased     GERD (gastroesophageal reflux disease)     High cholesterol     Hypertension     Hypokalemia 2021    Hypothyroidism     Kyphosis of cervical region, unspecified kyphosis type     Lumbar pain     Migraine headache     Postmenopausal disorder     Shingles     Thrombocytopenia     Thyroid disease     Unspecified cataract     Weakness 2021       Past Surgical History:   Procedure Laterality Date    BACK SURGERY      BLADDER SURGERY  2006    Dr Robert in [place] stem cell implant to bladder    CATARACT EXTRACTION Left     Dr Barroso 2016     SECTION      COLONOSCOPY  10/2012    Dr Corbett    ESOPHAGOGASTRODUODENOSCOPY  2021    schatzkis ring - dilated, gastroduodenitis, bile reflux - pickard    HYSTERECTOMY      NOSE SURGERY  2021    Dr De Souza in [place] removal of skin cancer    OOPHORECTOMY  1977    TONSILLECTOMY         Review of patient's allergies indicates:   Allergen Reactions    Ace inhibitors Anaphylaxis     cough    Belladonna alkaloids Anaphylaxis     Other reaction(s): Low Platelet Count    Librax (with clidinium) [chlordiazepoxide-clidinium] Anaphylaxis    Methotrexate analogues Anaphylaxis and Nausea And Vomiting    Codeine     Codeine sulfate      Other reaction(s): Rash, dizziness    Cymbalta [duloxetine] Hallucinations    Methotrexate sodium      Other reaction(s): wt loss, n/v    Solifenacin Hallucinations       No current facility-administered medications on file prior to encounter.      Current Outpatient Medications on File Prior to Encounter   Medication Sig    acetaminophen (TYLENOL) 325 MG tablet Take 2 tablets (650 mg total) by mouth every 8 (eight) hours as needed for Temperature greater than (or equal to 101 degree F).    cholecalciferol, vitamin D3, (VITAMIN D3) 50 mcg (2,000 unit) Cap Take 5,000 Units by mouth once daily. 5,000un qd    docusate sodium (COLACE) 100 MG capsule Take 100 mg by mouth nightly.    famotidine (PEPCID) 20 MG tablet Take 1 tablet (20 mg total) by mouth 2 (two) times daily.    hydrOXYchloroQUINE (PLAQUENIL) 200 mg tablet Take 200 mg by mouth every other day. Every other day    levothyroxine (SYNTHROID) 88 MCG tablet TAKE 1 TABLET BY MOUTH EVERY DAY    losartan (COZAAR) 50 MG tablet Take 1 tablet (50 mg total) by mouth once daily.    mirabegron (MYRBETRIQ) 50 mg Tb24 Take 1 tablet (50 mg total) by mouth once daily.    multivit-min/iron/folic/lutein (CENTRUM SILVER WOMEN ORAL) Take by mouth.    nitrofurantoin (MACRODANTIN) 50 MG capsule Take 50 mg by mouth nightly.    pramipexole (MIRAPEX) 0.25 MG tablet SMARTSI Tablet(s) By Mouth Every Evening    predniSONE (DELTASONE) 1 MG tablet Take 1 mg by mouth 2 (two) times daily.    simvastatin (ZOCOR) 40 MG tablet Take 1 tablet (40 mg total) by mouth every evening.    cetirizine (ZYRTEC) 10 MG tablet Take 1 tablet (10 mg total) by mouth once daily. (Patient taking differently: Take 10 mg by mouth daily as needed.)    ID NOW COVID-19 TEST KIT Kit TEST AS DIRECTED TODAY    triamcinolone acetonide 0.1% (KENALOG) 0.1 % cream Apply topically 2 (two) times daily.     Family History       Problem Relation (Age of Onset)    Cancer Mother, Maternal Aunt    Heart attack Father    Heart disease Father    Stroke Maternal Aunt          Tobacco Use    Smoking status: Never    Smokeless tobacco: Never   Substance and Sexual Activity    Alcohol use: Never    Drug use: Never    Sexual activity: Not Currently     Review of Systems    Constitutional:  Positive for activity change. Negative for chills and fever.   HENT:  Negative for ear pain, sinus pain, sore throat and trouble swallowing.         Mouth pain   Eyes:  Negative for pain and redness.   Respiratory:  Negative for cough, chest tightness, shortness of breath and wheezing.    Cardiovascular:  Negative for chest pain, palpitations and leg swelling.   Gastrointestinal:  Negative for abdominal pain, blood in stool, constipation, nausea and vomiting.   Genitourinary:  Negative for difficulty urinating and dysuria.   Musculoskeletal:  Positive for arthralgias, back pain, gait problem and myalgias.   Skin:  Negative for rash and wound.   Neurological:  Positive for weakness. Negative for seizures and syncope.   Psychiatric/Behavioral:  Negative for agitation, behavioral problems and confusion.    Objective:     Vital Signs (Most Recent):  Temp: 97.7 °F (36.5 °C) (11/14/22 0445)  Pulse: 62 (11/14/22 0548)  Resp: 18 (11/14/22 0445)  BP: (!) 168/72 (11/14/22 0548)  SpO2: 98 % (11/14/22 0445)   Vital Signs (24h Range):  Temp:  [97.7 °F (36.5 °C)-98 °F (36.7 °C)] 97.7 °F (36.5 °C)  Pulse:  [62-79] 62  Resp:  [18] 18  SpO2:  [98 %] 98 %  BP: (168-202)/(72-95) 168/72     Weight: 45.5 kg (100 lb 5 oz)  Body mass index is 16.19 kg/m².    Physical Exam  Constitutional:       Appearance: Normal appearance.      Comments: Thin female   HENT:      Nose: Nose normal.      Mouth/Throat:      Mouth: Mucous membranes are moist.   Eyes:      Extraocular Movements: Extraocular movements intact.      Pupils: Pupils are equal, round, and reactive to light.   Cardiovascular:      Rate and Rhythm: Normal rate and regular rhythm.   Pulmonary:      Effort: Pulmonary effort is normal.      Breath sounds: Normal breath sounds.   Abdominal:      General: Bowel sounds are normal. There is no distension.      Palpations: Abdomen is soft.      Tenderness: There is no abdominal tenderness.   Musculoskeletal:      Cervical  back: Normal range of motion and neck supple.      Comments: Arthritic changes to hands noted, kyphosis of upper back noted   Skin:     General: Skin is warm and dry.   Neurological:      General: No focal deficit present.      Mental Status: She is alert and oriented to person, place, and time.   Psychiatric:         Mood and Affect: Mood normal.         Behavior: Behavior normal.         CRANIAL NERVES     CN III, IV, VI   Pupils are equal, round, and reactive to light.     Significant Labs: All pertinent labs within the past 24 hours have been reviewed.  Recent Lab Results       None            Significant Imaging: I have reviewed all pertinent imaging results/findings within the past 24 hours.

## 2022-11-14 NOTE — PLAN OF CARE
Problem: Rehabilitation ([facility]) Plan of Care  Goal: Plan of Care Review  11/14/2022 1007 by Amanda Huber LPN  Outcome: Ongoing, Progressing  11/14/2022 1006 by Amanda Huber LPN  Outcome: Ongoing, Progressing  Goal: Patient-Specific Goal (Individualized)  11/14/2022 1007 by Amanda Huber LPN  Outcome: Ongoing, Progressing  11/14/2022 1006 by Amanda Huber LPN  Outcome: Ongoing, Progressing  Goal: Absence of New-Onset Illness or Injury  11/14/2022 1007 by Amanda Huber LPN  Outcome: Ongoing, Progressing  11/14/2022 1006 by Amanda Huber LPN  Outcome: Ongoing, Progressing  Goal: Optimal Comfort and Wellbeing  11/14/2022 1007 by Amanda Huber LPN  Outcome: Ongoing, Progressing  11/14/2022 1006 by Amanda Huber LPN  Outcome: Ongoing, Progressing  Goal: Readiness for Transition of Care  11/14/2022 1007 by Amanda Huber LPN  Outcome: Ongoing, Progressing  11/14/2022 1006 by Amanda Huber LPN  Outcome: Ongoing, Progressing     Problem: Rehabilitation ([facility]) Plan of Care  Goal: Plan of Care Review  11/14/2022 1007 by Amanda Huber LPN  Outcome: Ongoing, Progressing  11/14/2022 1006 by Amanda Huber LPN  Outcome: Ongoing, Progressing  Goal: Patient-Specific Goal (Individualized)  11/14/2022 1007 by Amanda Huber LPN  Outcome: Ongoing, Progressing  11/14/2022 1006 by Amanda Huber LPN  Outcome: Ongoing, Progressing  Goal: Absence of New-Onset Illness or Injury  11/14/2022 1007 by Amanda Huber LPN  Outcome: Ongoing, Progressing  11/14/2022 1006 by Aamnda Huber LPN  Outcome: Ongoing, Progressing  Goal: Optimal Comfort and Wellbeing  11/14/2022 1007 by Amanda Huber LPN  Outcome: Ongoing, Progressing  11/14/2022 1006 by Amanda Huber LPN  Outcome: Ongoing, Progressing  Goal: Readiness for Transition of Care  11/14/2022 1007 by Amanda Huber LPN  Outcome: Ongoing, Progressing  11/14/2022 1006 by Amanda Huber LPN  Outcome: Ongoing, Progressing

## 2022-11-14 NOTE — PT/OT/SLP PROGRESS
Physical Therapy         Treatment        Katarzyna Warner   MRN: [id number]     PT Received On: 11/14/22  PT Start Time: 1015     PT Stop Time: 1145    PT Total Time (min): 90 min       Billable Minutes:  Gait [id number], Therapeutic Activity 30, and Therapeutic Exercise 30  Total Minutes: 90    Treatment Type: Treatment  PT/PTA: PT             General Precautions: Standard,    Orthopedic Precautions:     Braces:           Subjective:  Communicated with  nurse prior to session.         Objective:  Patient found in bed, with      Functional Mobility:  Bed Mobility:   Supine to sit: Independent   Sit to supine: Independent   Rolling: Independent   Scooting: Independent    Balance:   Static Sit: GOOD: Takes MODERATE challenges from all directions  Dynamic Sit:  GOOD: Maintains balance through MODERATE excursions of active trunk movement  Static Stand: GOOD: Takes MODERATE challenges from all directions  Dynamic stand: FAIR+: Needs CLOSE SUPERVISION during gait and is able to right self with minor LOB    Transfer Training:  Sit to stand:Modified Independent with Rolling Walker    Bed <> Chair:  Step Transfer with Modified Independent with Rolling Walker      Wheelchair Training:  Ind with wc mobility    Gait Training:  Amb 200ft x 3 attemptes with rw supervision    Stair Training:  Supervision going up and down 12 steps with rails sba      Additional Treatment:  Pt performed 4 sets 10 reps sit to stand with rw with mod ind followed by 15 min on nustep at level 3    Activity Tolerance:  Patient tolerated treatment well    Patient left supine with call button in reach.    Assessment:  Katarzyna Warner is a 87 y.o. female with a medical diagnosis of Debility. She presents with doing most activities with mod ind and is working toward meeting all of her LTGs.    Rehab potential is good.    Activity tolerance: Good    Discharge recommendations:       Equipment recommendations:       GOALS:   Multidisciplinary Problems        Physical Therapy Goals          Problem: Physical Therapy    Goal Priority Disciplines Outcome Goal Variances Interventions   Physical Therapy Goal     PT, PT/OT      Description: Short term goals to be made 22    Patient will increase functional independence with mobility by performin. Supine to sit with SBA  2. Sit to supine with SBA  3. Sit to stand transfer with Stand-by Assistance  4. Bed to chair transfer with Supervision using Rolling Walker  5. Gait  x 150 feet with Supervision using Rolling Walker.   6. Ascend/descend 8 stair with bilateral Handrails supervision    Long term goals to be met by 22 .     1. Supine to sit with modified independent  2. Sit to supine with modified independent   3. Sit to stand transfer with modified independence  4. Bed to chair transfer with modified independence using Rolling Walker  5. Gait  x 150 feet with modified independence using Rolling Walker.   6. Ascend/descend 12 stair with bilateral Handrails modified independence                         PLAN:    Patient to be seen    to address the above listed problems via    Plan of Care expires:    Plan of Care reviewed with:           2022

## 2022-11-14 NOTE — PT/OT/SLP PROGRESS
Occupational Therapy  Weekly Progress Note    Katarzyna Warner   MRN: [id number]   Admitting Diagnosis: Debility    OT Date of Treatment: 11/14/22   OT Start Time: 1230  OT Stop Time: 1400  OT Total Time (min): 90 min    Treatment Type: Individual 90     Billable Minutes:  Self Care/Home Management 45 and Therapeutic Exercise 45  Total Minutes: 90     OT/TAL: OT          General Precautions: Standard, fall  Orthopedic Precautions: N/A  Braces:      Spiritual, Cultural Beliefs, Hinduism Practices, Values that Affect Care: no    Subjective:  Communicated with nurse prior to session.    Pain/Comfort  Pain Rating 1: 8/10  Location - Orientation 1: lower  Location 1: back  Pain Addressed 1: Reposition, Cessation of Activity  Pain Rating Post-Intervention 1: 6/10    Objective:  Pt was cooperative and motivated without verbal encouragement while exhibiting positive affect. She participated in extensive ADL retraining as further noted below emphasizing fall prevention, and use of assistive devices and compensatory strategies providing extra time requiring min-mod verbal and tactile cueing for posture, safety awareness, and technique with use of AD. Pt then participated in therapeutic exercise performing 5x15 seated pushups requiring verbal and tactile cueing for technique challenging her to lift buttocks off seated surface to end range elbow extension in order to strengthen triceps brachii to improve performance with sit <> stand transitions particularly from lower surfaces. Also, she participated in therapeutic exercise performing 3x12 bilateral UE strengthening exercises utilizing moderate to heavy resistance theraband with scapular retraction, shoulder extension, shoulder adduction, horizontal abduction, shoulder flexion in plane of scaption, internal rotation, external rotation, and elbow flexion requiring mod verbal and tactile cueing for technique emphasizing quality of movement.     Functional Mobility:  Bed  Mobility:   Supine to sit: Modified Independent   Sit to supine: Modified Independent   Rolling: Modified Independent   Scooting: Modified Independent    Transfer Training:   Sit to stand:Supervision or Set-up Assistance with Rolling Walker .  Bed <> Chair:  Step Transfer with Supervision or Set-up Assistance with Rolling Walker .  Toilet Transfer:  Pt Step Transfer with Supervision or Set-up Assistance with Grab bars .  Patient performed shower transfer Step Transfer with Supervision or Set-up Assistance with Grab bars and shower chair.    Feeding:  Patient performed feeding with Independent.     Grooming:  Patient peformed hand washing with Modified Independent at standing at sink.  Patient performed face washing with Modified Independent at standing at sink.  Patient performed oral hygeine with Modified Independent at sitting at sink.  Patient performe hair grooming with Modified Independent at sitting at sink.    Bathing:  Patient performed bathing with Stand-by Assistance with grab bar, Handheld shower head, and shower chair at Shower.    UE Dressing:  Patient performed UE Dressing with Supervision or Set-up Assistance with extra time at Edge of bed.    LE Dressing:  Patient don/doffed socks with Stand-by Assistance, Patient don/doffed shoes with Stand-by Assistance, Patient performed don/doffed adult brief with Supervision or Set-up Assistance, and Patient performed don/doffed pants with Supervision or Set-up Assistance    Toilet Training:  Pt performed toileting with Supervision or Set-up Assistance with Grab  bar at [employer].    Balance:   Static Sit: GOOD: Takes MODERATE challenges from all directions  Dynamic Sit:  GOOD: Maintains balance through MODERATE excursions of active trunk movement  Static Stand: GOOD-: Takes MODERATE challenges from all directions inconsistently  Dynamic stand: FAIR+: Needs CLOSE SUPERVISION during gait and is able to right self with minor LOB      Patient left HOB elevated with  call button in reach and nurse notified    ASSESSMENT:  Pt demonstrated good progress with functional short term goals as noted by changes in functional scores in which patient able to achieve 7/7 STG's while progressing well towards long term goals. Pt now supervision to setup assistance with ADL's including functional mobility with extra time, and use of assistive device and compensatory strategies. She demonstrated increased strength in bilateral UE's to mostly 4 / 5 manual muscle grades allowing for improved functional performance with ADL's while also demonstrating improved static / dynamic sitting to GOOD, static standing balance to GOOD-, and dynamic standing balance to FAIR+ impacting safety and fall risk.     Rehab potential is good    Activity tolerance: Good    Discharge recommendations: other (see comments) (To be further determined based on progress prior to discharge.)     Equipment recommendations: none     GOALS:   Short Term Goals to be met by: 11/16/22      Patient will increase functional independence with ADLs by performing:     UE Dressing with Set-up Assistance. MET  LE Dressing with Supervision. MET  Toileting from toilet with Supervision for hygiene and clothing management. MET  Bathing from  shower chair/bench with Supervision. MET  Step transfer with Supervision. MET  Toilet transfer to toilet with Supervision. MET  Increased functional strength to 4- to 4/5 for bilateral UE's. MET     Long Term Goals to be met by: 11/23/22      Patient will increase functional independence with ADLs by performing:     Feeding with [place]. MET  UE Dressing with Modified [place]. ONGOING  LE Dressing with Modified [place]. ONGOING  Grooming while standing at sink with Modified [place]. ONGOING  Toileting from toilet with Modified [place] for hygiene and clothing management. ONGOING  Bathing from  shower chair/bench with Modified [place]. ONGOING  Step transfer with Modified  [place]. ONGOING  Toilet transfer to toilet with Modified [place]. ONGOING  Increased functional strength to 4+/5 for bilateral UE's. ONGOING      Plan:  Patient to be seen 5 x/week (90 min per day, for 14 days) to address the above listed problems via self-care/home management, community/work re-entry, therapeutic activities, therapeutic exercises  Plan of Care expires: 11/23/22  Plan of Care reviewed with: patient         11/14/2022

## 2022-11-14 NOTE — PROGRESS NOTES
[facility] Medicine  Progress Note    Patient Name: Katarzyna Warner  MRN: [id number]  Patient Class: IP- Rehab   Admission Date: 11/9/2022  Length of Stay: 5 days  Attending Physician: Ayo Nolen III, MD  Primary Care Provider: Juliet Barclay MD        Subjective:     Principal Problem:Debility        HPI:    87-year-old female with history of anemia anxiety, chronic arthritis, hypertension and previous UTIs presented to the emergency department on 11/7/22 with nausea, frequent urination and generalized weakness.  Chest x-ray showed no acute changes vital signs were stable and patient was admitted for observation for IV antibiotics and fluids for acute cystitis without hematuria.  On 11/8 she developed a fever and was lethargic and chills and was very weak.  Therapy was started to help with the weakness and on 11/09 her acute medical needs were addressed and she was transferred to inpatient rehab for ongoing management of debility.  Prior to hospital admission, patient lived alone and was independent utilizing straight cane.  Currently patient is standby assistance for functional mobility and ambulating 80 ft with CGA with a rolling walker and Min to mod A for ADLs.  She had significant decline in functional ADLs and would benefit from multidisciplinary approach to regain strength, endurance and safety awareness.  She continues to need 24 hour medical management for medication maintenance and adjustments, lab monitoring, IV care, accurate I's and nose, monitoring of skin integrity, respiratory monitoring, monitoring glucose levels cognition and therapy so she can return alone to her prior level function.  At this time she will benefit from admission to acute rehab unit for continuation medical supervision by Internal Medicine.  In addition admission to acute rehab unit will add treatment by multidisciplinary team to improve functional status prior to  discharge.      Overview/Hospital Course:  22 DL:  Patient feeling well today.  Patient states she has been working well with therapy.  Denies pain.  Patient states she is eager to get stronger and return home.  Patient encouraged to continue therapy efforts.    22 DL:  Patient doing well today.  Patient states she has been working well with therapy and feels like she is getting stronger.  Patient encouraged to continue therapy efforts.      Past Medical History:   Diagnosis Date    Acute cystitis without hematuria 2021    Altered mental status 2021    Anemia 2021    Anxiety     Arthritis     Arthritis, rheumatoid     Bell's palsy     Bursitis of left shoulder     Carpal tunnel syndrome, bilateral     Debility 2021    Elevated troponin 2021    Erosive osteoarthritis of both hands     Esophagus disorder     needs to have her esophagus stretched again    Fall 2021    Function kidney decreased     GERD (gastroesophageal reflux disease)     High cholesterol     Hypertension     Hypokalemia 2021    Hypothyroidism     Kyphosis of cervical region, unspecified kyphosis type     Lumbar pain     Migraine headache     Postmenopausal disorder     Shingles     Thrombocytopenia     Thyroid disease     Unspecified cataract     Weakness 2021       Past Surgical History:   Procedure Laterality Date    BACK SURGERY      BLADDER SURGERY  2006    Dr Robert in [place] stem cell implant to bladder    CATARACT EXTRACTION Left     Dr Barroso 2016     SECTION      COLONOSCOPY  10/2012    Dr Corbett    ESOPHAGOGASTRODUODENOSCOPY  2021    schatzkis ring - dilated, gastroduodenitis, bile reflux - pickard    HYSTERECTOMY      NOSE SURGERY  2021    Dr De Souza in [place] removal of skin cancer    OOPHORECTOMY  1977    TONSILLECTOMY         Review of patient's allergies indicates:   Allergen Reactions    Ace inhibitors Anaphylaxis      cough    Belladonna alkaloids Anaphylaxis     Other reaction(s): Low Platelet Count    Librax (with clidinium) [chlordiazepoxide-clidinium] Anaphylaxis    Methotrexate analogues Anaphylaxis and Nausea And Vomiting    Codeine     Codeine sulfate      Other reaction(s): Rash, dizziness    Cymbalta [duloxetine] Hallucinations    Methotrexate sodium      Other reaction(s): wt loss, n/v    Solifenacin Hallucinations       No current facility-administered medications on file prior to encounter.     Current Outpatient Medications on File Prior to Encounter   Medication Sig    acetaminophen (TYLENOL) 325 MG tablet Take 2 tablets (650 mg total) by mouth every 8 (eight) hours as needed for Temperature greater than (or equal to 101 degree F).    cholecalciferol, vitamin D3, (VITAMIN D3) 50 mcg (2,000 unit) Cap Take 5,000 Units by mouth once daily. 5,000un qd    docusate sodium (COLACE) 100 MG capsule Take 100 mg by mouth nightly.    famotidine (PEPCID) 20 MG tablet Take 1 tablet (20 mg total) by mouth 2 (two) times daily.    hydrOXYchloroQUINE (PLAQUENIL) 200 mg tablet Take 200 mg by mouth every other day. Every other day    levothyroxine (SYNTHROID) 88 MCG tablet TAKE 1 TABLET BY MOUTH EVERY DAY    losartan (COZAAR) 50 MG tablet Take 1 tablet (50 mg total) by mouth once daily.    mirabegron (MYRBETRIQ) 50 mg Tb24 Take 1 tablet (50 mg total) by mouth once daily.    multivit-min/iron/folic/lutein (CENTRUM SILVER WOMEN ORAL) Take by mouth.    nitrofurantoin (MACRODANTIN) 50 MG capsule Take 50 mg by mouth nightly.    pramipexole (MIRAPEX) 0.25 MG tablet SMARTSI Tablet(s) By Mouth Every Evening    predniSONE (DELTASONE) 1 MG tablet Take 1 mg by mouth 2 (two) times daily.    simvastatin (ZOCOR) 40 MG tablet Take 1 tablet (40 mg total) by mouth every evening.    cetirizine (ZYRTEC) 10 MG tablet Take 1 tablet (10 mg total) by mouth once daily. (Patient taking differently: Take 10 mg by mouth daily as needed.)     ID NOW COVID-19 TEST KIT Kit TEST AS DIRECTED TODAY    triamcinolone acetonide 0.1% (KENALOG) 0.1 % cream Apply topically 2 (two) times daily.     Family History       Problem Relation (Age of Onset)    Cancer Mother, Maternal Aunt    Heart attack Father    Heart disease Father    Stroke Maternal Aunt          Tobacco Use    Smoking status: Never    Smokeless tobacco: Never   Substance and Sexual Activity    Alcohol use: Never    Drug use: Never    Sexual activity: Not Currently     Review of Systems   Constitutional:  Positive for activity change. Negative for chills and fever.   HENT:  Negative for ear pain, sinus pain, sore throat and trouble swallowing.         Mouth pain   Eyes:  Negative for pain and redness.   Respiratory:  Negative for cough, chest tightness, shortness of breath and wheezing.    Cardiovascular:  Negative for chest pain, palpitations and leg swelling.   Gastrointestinal:  Negative for abdominal pain, blood in stool, constipation, nausea and vomiting.   Genitourinary:  Negative for difficulty urinating and dysuria.   Musculoskeletal:  Positive for arthralgias, back pain, gait problem and myalgias.   Skin:  Negative for rash and wound.   Neurological:  Positive for weakness. Negative for seizures and syncope.   Psychiatric/Behavioral:  Negative for agitation, behavioral problems and confusion.    Objective:     Vital Signs (Most Recent):  Temp: 97.7 °F (36.5 °C) (11/14/22 0445)  Pulse: 62 (11/14/22 0548)  Resp: 18 (11/14/22 0445)  BP: (!) 168/72 (11/14/22 0548)  SpO2: 98 % (11/14/22 0445)   Vital Signs (24h Range):  Temp:  [97.7 °F (36.5 °C)-98 °F (36.7 °C)] 97.7 °F (36.5 °C)  Pulse:  [62-79] 62  Resp:  [18] 18  SpO2:  [98 %] 98 %  BP: (168-202)/(72-95) 168/72     Weight: 45.5 kg (100 lb 5 oz)  Body mass index is 16.19 kg/m².    Physical Exam  Constitutional:       Appearance: Normal appearance.      Comments: Thin female   HENT:      Nose: Nose normal.      Mouth/Throat:      Mouth:  Mucous membranes are moist.   Eyes:      Extraocular Movements: Extraocular movements intact.      Pupils: Pupils are equal, round, and reactive to light.   Cardiovascular:      Rate and Rhythm: Normal rate and regular rhythm.   Pulmonary:      Effort: Pulmonary effort is normal.      Breath sounds: Normal breath sounds.   Abdominal:      General: Bowel sounds are normal. There is no distension.      Palpations: Abdomen is soft.      Tenderness: There is no abdominal tenderness.   Musculoskeletal:      Cervical back: Normal range of motion and neck supple.      Comments: Arthritic changes to hands noted, kyphosis of upper back noted   Skin:     General: Skin is warm and dry.   Neurological:      General: No focal deficit present.      Mental Status: She is alert and oriented to person, place, and time.   Psychiatric:         Mood and Affect: Mood normal.         Behavior: Behavior normal.         CRANIAL NERVES     CN III, IV, VI   Pupils are equal, round, and reactive to light.     Significant Labs: All pertinent labs within the past 24 hours have been reviewed.  Recent Lab Results       None            Significant Imaging: I have reviewed all pertinent imaging results/findings within the past 24 hours.      Assessment/Plan:      * Debility  ESTIMATED LENGTH OF STAY:  The patient's estimated length of stay is 7-10 days and she is expected to be able to be discharged to her home with family and home health.    REHABILITATION PLAN/GOALS  The patient is being admitted to a comprehensive acute inpatient rehabilitation program consisting of at least 3 hours of combined physical ( 1.5hrs./day, 5 days a week), and occupational therapy (1.5 hrs. A day, 5 days a week),speech therapy services if necessary, 24-hour skilled rehabilitation nursing care, and close supervision of a physician with special training and experience in rehabilitation medicine. Patient's prognosis for significant practical improvement within a  reasonable period of time appears good . Given the patient's complex medical condition and risk of further medical complications, rehabilitation services could not be safely provided at a lower level of care such as a skilled nursing facility.                    1. Physical Therapy to Work on bed mobility   2. Occupational Therapy to ADL Retraining, Functional Transfer Training, Neuromuscular Re-education, Manual Therapy and Use of Adaptive Equipment and Retraining   3. Speech Pathology to Evaluate and assist with dysphagia, Evaluate and Treat expressive and receptive language deficits and Evaluate and treat motor speech deficits   4. Specialized 24-hour Rehabilitation Nursing Care to Bowel and bladder training, Neurological checks and Wound care   5. Dietary to adjust to appropriate diet   6. Social Work/Case management to assist with discharge planning activities, acquisition of durable medical equipment, and ordering of follow up services as necessary.    The services provided in the acute medical rehab setting are under my supervision 24 hours a day. These services are necessary for this patient to achieve the most appropriate functional outcome and to determine the most appropriate discharge disposition.  I have reviewed the treatment plan with the patient and answered all of her questions, discussed goals and expectation.    REVIEW OF PRE-SCREEN ASSESSMENT  I have reviewed the patient's preadmission screen including her medical and functional status and compared it with her status upon arrival to the rehab unit and see no changes . Acute rehab services are still necessary and appropriate for this patient to achieve the best functional outcome while determining the most appropriate discharge disposition and services. The patient will require close medical management of multiple medical co-morbidities including UTI, arthritis, hypertension, hyperlipidemia and hypothyroidism .       Hyperlipidemia,  mixed  Continue statin      Hypothyroid  Cont synthroid      Acute cystitis without hematuria  Urine culture showed Klebsiella sensitive to several p.o. antibiotics will DC IV Rocephin and start p.o. Cipro for patient      GERD (gastroesophageal reflux disease)  Continue famotodine    Arthritis  Followed by Rheumatology takes chronic prednisone and Plaquenil      Hypertension  Monitor bp = adjust meds if needed      VTE Risk Mitigation (From admission, onward)         Ordered     IP VTE HIGH RISK PATIENT  Once         11/09/22 1318     Place sequential compression device  Until discontinued         11/09/22 1318     Place JEREMY hose  Until discontinued         11/09/22 1318                Discharge Planning   REGINALDO: 11/9/2022     Code Status: DNR   Is the patient medically ready for discharge?:     Reason for patient still in hospital (select all that apply): Patient trending condition  Discharge Plan A: Home Health                  Olive Marie NP  Department of Hospital Medicine   [facility] ([facility])

## 2022-11-15 DIAGNOSIS — I10 HYPERTENSION, UNSPECIFIED TYPE: Primary | ICD-10-CM

## 2022-11-15 PROCEDURE — 97530 THERAPEUTIC ACTIVITIES: CPT

## 2022-11-15 PROCEDURE — 97535 SELF CARE MNGMENT TRAINING: CPT

## 2022-11-15 PROCEDURE — 25000003 PHARM REV CODE 250: Performed by: INTERNAL MEDICINE

## 2022-11-15 PROCEDURE — 63600175 PHARM REV CODE 636 W HCPCS: Performed by: INTERNAL MEDICINE

## 2022-11-15 PROCEDURE — 11800000 HC REHAB PRIVATE ROOM

## 2022-11-15 PROCEDURE — 97110 THERAPEUTIC EXERCISES: CPT

## 2022-11-15 PROCEDURE — 97116 GAIT TRAINING THERAPY: CPT

## 2022-11-15 RX ORDER — LOSARTAN POTASSIUM 100 MG/1
100 TABLET ORAL DAILY
Qty: 90 TABLET | Refills: 3 | Status: SHIPPED | OUTPATIENT
Start: 2022-11-15 | End: 2023-11-16

## 2022-11-15 RX ADMIN — CIPROFLOXACIN 250 MG: 250 TABLET, COATED ORAL at 08:11

## 2022-11-15 RX ADMIN — PRAMIPEXOLE DIHYDROCHLORIDE 0.25 MG: 0.12 TABLET ORAL at 08:11

## 2022-11-15 RX ADMIN — PREDNISONE 2 MG: 1 TABLET ORAL at 08:11

## 2022-11-15 RX ADMIN — DOCUSATE SODIUM 100 MG: 100 CAPSULE, LIQUID FILLED ORAL at 09:11

## 2022-11-15 RX ADMIN — MIRABEGRON 50 MG: 50 TABLET, FILM COATED, EXTENDED RELEASE ORAL at 08:11

## 2022-11-15 RX ADMIN — LOSARTAN POTASSIUM 100 MG: 50 TABLET, FILM COATED ORAL at 08:11

## 2022-11-15 RX ADMIN — ATORVASTATIN CALCIUM 40 MG: 40 TABLET, FILM COATED ORAL at 08:11

## 2022-11-15 RX ADMIN — FAMOTIDINE 20 MG: 20 TABLET, FILM COATED ORAL at 08:11

## 2022-11-15 RX ADMIN — LEVOTHYROXINE SODIUM 88 MCG: 88 TABLET ORAL at 05:11

## 2022-11-15 NOTE — PT/OT/SLP PROGRESS
Physical Therapy     Weekly  Treatment Note       Katarzyna Warner   MRN: [id number]     PT Received On: 11/15/22  PT Start Time: 1400     PT Stop Time: 1530    PT Total Time (min): 90 min       Billable Minutes:  Gait [id number], Therapeutic Activity 30, and Therapeutic Exercise 30  Total Minutes: 90    Treatment Type: Treatment  PT/PTA: PT             General Precautions: Standard,    Orthopedic Precautions:     Braces:           Subjective:  Communicated with nurse  prior to session.         Objective:  Patient found in bed, with      Functional Mobility:  Bed Mobility:   Supine to sit: Independent   Sit to supine: Independent   Rolling: Independent   Scooting: Independent    Balance:   Static Sit: GOOD: Takes MODERATE challenges from all directions  Dynamic Sit:  GOOD: Maintains balance through MODERATE excursions of active trunk movement  Static Stand: GOOD: Takes MODERATE challenges from all directions  Dynamic stand: GOOD: Needs SUPERVISION only during gait and able to self right with moderate LOB    Transfer Training:  Sit to stand:Modified Independent with Rolling Walker    Bed <> Chair:  Step Transfer with Modified Independent with Rolling Walker      Wheelchair Training:  Ind with wc mobility    Gait Training:  Ind with rw 200ft on a level surface    Stair Training:  Pt goes up and down 12 steps with rails with mod ind      Additional Treatment:  Pt performed 4 sets 10 reps sit to stand with no use of hands followed by nustep 15 min at level 3. Pt then performed BLE sitting exs with 2.5lb ankle wts    Activity Tolerance:  Patient tolerated treatment well    Patient left supine with call button in reach.    Assessment:  Katarzyna Warner is a 87 y.o. female with a medical diagnosis of Debility. She presents with being mod ind to ind with all fxnl mobility. Shje will be dc thursday.    Rehab potential is good.    Activity tolerance: Good    Discharge recommendations:       Equipment recommendations:        GOALS:   . Supine to sit with SBA-met  2. Sit to supine with SBA-met  3. Sit to stand transfer with Stand-by Assistance-met  4. Bed to chair transfer with Supervision using Rolling Walker-met  5. Gait  x 150 feet with Supervision using Rolling Walker. -met  6. Ascend/descend 8 stair with bilateral Handrails supervision-met     Long term goals to be met by 11/23/22 .      1. Supine to sit with modified independent-met  2. Sit to supine with modified independent -met  3. Sit to stand transfer with modified independence-met  4. Bed to chair transfer with modified independence using Rolling Walker-met  5. Gait  x 150 feet with modified independence using Rolling Walker. -met  6. Ascend/descend 12 stair with bilateral Handrails modified independence-met         PLAN:    Patient to be seen    to address the above listed problems via    Plan of Care expires:    Plan of Care reviewed with: patient         11/15/2022

## 2022-11-15 NOTE — PROGRESS NOTES
[facility])  Adult Nutrition  Progress Note    SUMMARY       Recommendations  1. Rec'd Cardiac diet.   2. Rec'd Tristan BID to promote wound healing and provide 192kcal and 5g protein. 3. Rec'd ONS: Vanilla Ensure Enlive BID to provide 700kcal and 40g protein.   4. RD to follow and make rec's accordingly.  Goals: 1. Pt will consume > 75% EEN via PO intake by next RD follow up.  Nutrition Goal Status: progressing towards goal  Communication of RD Recs: discussed on rounds    Assessment and Plan    Nutrition Problem  Inadequate oral intake     Related to (etiology):   Decreased appetite     Signs and Symptoms (as evidenced by):   Pt stating she forgets to eat, PO intake 50%      Interventions/Recommendations (treatment strategy):  1. Rec'd Cardiac diet.   2. Rec'd Tristan BID to promote wound healing and provide 192kcal and 5g protein. 3. Rec'd ONS: Vanilla Ensure Enlive BID to provide 700kcal and 40g protein.   4. RD to follow and make rec's accordingly.     Nutrition Diagnosis Status:   Continues     Reason for Assessment    Reason For Assessment: RD follow-up  Diagnosis: other (see comments) (No active principal problem)  Relevant Medical History: Acute cystitis without hematuria, Altered mental status, Anemia, Anxiety, Rheumatoid Arthritis, Bell's palsy, Bursitis of left shoulder, Carpal tunnel syndrome, Debility, Elevated troponin, Erosive osteoarthritis of both hands,  Esophagus disorder, Function kidney decreased, GERD, High cholesterol,  Hypertension, Hypokalemia,  Hypothyroidism, Kyphosis of cervical region, kyphosis type, Lumbar pain,  Migraine headache, Postmenopausal disorder, Shingles, Thrombocytopenia, Thyroid disease, Unspecified cataract,  Weakness  Interdisciplinary Rounds: attended  General Information Comments: Followed up on pt this afternoon. Spoke with pt about current appetite and PO intake. Pt stated that her appetite is improving and is better than what it was. RD to follow and  "make rec's accordingly.  Nutrition Discharge Planning: TBD as care progresses    Nutrition Risk Screen    Nutrition Risk Screen: no indicators present    Nutrition/Diet History    Food Preferences: Likes: Vanilla Ensure, seafood, mashed potatoes; Dislikes: Peas & rice  Spiritual, Cultural Beliefs, Denominational Practices, Values that Affect Care: no  Food Allergies: NKFA    Anthropometrics    Temp: 98.2 °F (36.8 °C)  Height Method: Stated  Height: 5' 6" (167.6 cm)  Height (inches): 66 in  Weight Method: Bed Scale  Weight: 45.4 kg (100 lb 1.6 oz)  Weight (lb): 100.1 lb  Ideal Body Weight (IBW), Female: 130 lb  % Ideal Body Weight, Female (lb): 77.16 %  BMI (Calculated): 16.2  BMI Grade: 16 - 16.9 protein-energy malnutrition grade II    Lab/Procedures/Meds    Pertinent Labs Reviewed: reviewed  Pertinent Medications Reviewed: reviewed    Estimated/Assessed Needs    Weight Used For Calorie Calculations: 45.5 kg (100 lb 5 oz)  Energy Calorie Requirements (kcal): 1108 (MSJ x 1.2SF)  Energy Need Method: [facility]  Protein Requirements: 55-68 (1.2-2.5g/kg)  Weight Used For Protein Calculations: 45.5 kg (100 lb 5 oz)  Fluid Requirements (mL): 1108 (1mL/kcal)  Estimated Fluid Requirement Method: RDA Method  RDA Method (mL): 1108    Nutrition Prescription Ordered    Current Diet Order: Regular  Oral Nutrition Supplement: None    Evaluation of Received Nutrient/Fluid Intake    % Kcal Needs: 25-50%  % Protein Needs: 25-50%  I/O: +920  Energy Calories Required: not meeting needs  Protein Required: not meeting needs  Tolerance: tolerating  % Intake of Estimated Energy Needs: 25 - 50 %  % Meal Intake: 25 - 50 %    Nutrition Risk    Level of Risk/Frequency of Follow-up: moderate     Monitor and Evaluation    Food and Nutrient Intake: energy intake, food and beverage intake  Food and Nutrient Adminstration: diet order  Knowledge/Beliefs/Attitudes: food and nutrition knowledge/skill, beliefs and attitudes  Physical Activity and " Function: nutrition-related ADLs and IADLs  Anthropometric Measurements: height/length, weight, weight change, body mass index  Biochemical Data, Medical Tests and Procedures: electrolyte and renal panel, glucose/endocrine profile, gastrointestinal profile, inflammatory profile, lipid profile  Nutrition-Focused Physical Findings: overall appearance     Nutrition Follow-Up    RD Follow-up?: Yes

## 2022-11-15 NOTE — PT/OT/SLP PROGRESS
Occupational Therapy  Treatment    Katarzyna Warner   MRN: [id number]   Admitting Diagnosis: Debility    OT Date of Treatment: 11/15/22   OT Start Time: 1030  OT Stop Time: 1200  OT Total Time (min): 90 min    Treatment Type: Individual 60 and Concurrent 30     Billable Minutes:  Self Care/Home Management 15, Therapeutic Activity 30, and Therapeutic Exercise 45  Total Minutes: 90     OT/TAL: OT          General Precautions: Standard, fall  Orthopedic Precautions: N/A  Braces:      Spiritual, Cultural Beliefs, Yazidi Practices, Values that Affect Care: no    Subjective:  Communicated with nurse prior to session.    Pain/Comfort  Pain Rating 1: 3/10  Location 1: sacral spine  Pain Addressed 1: Nurse notified, Reposition  Pain Rating Post-Intervention 1: 3/10    Objective:  Pt was cooperative and motivated without verbal encouragement while exhibiting positive affect. She participated in functional transfer retraining throughout session to / from bed, chair, and toilet emphasizing fall prevention providing extra time with repetition requiring supervision secondary to min verbal cueing for posture, safety awareness, and technique with use of AD. Pt then participated in ADL retraining regarding toileting emphasizing fall prevention with use of AD providing extra time requiring supervision secondary to min verbal cueing for technique with use of RW and grab bar for stabilization. Next, she participated in therapeutic activity addressing functional reaching, gross motor coordination, static / dynamic standing balance, standing tolerance, and energy for task / endurance challenging her to reach in multiple planes utilizing bilateral UE's to retrieve, lift, stabilize, manipulate, and place various items needed to perform functional tasks of ADL's requiring min-mod verbal and tactile cueing to facilitate optimal movement patterns, proper standing posture, appropriate weight shifting and positioning within RW while utilizing  forward, lateral, and diagonal steps without steadying assist. Pt then participated in therapeutic exercise performing 3x15 bilateral UE strengthening exercises utilizing moderate to heavy resistance theraband with scapular retraction, shoulder extension, shoulder adduction, horizontal abduction, shoulder flexion in plane of scaption, internal rotation, external rotation, and elbow flexion requiring mod verbal and tactile cueing for technique emphasizing quality of movement.      Functional Mobility:  Bed Mobility:   Supine to sit: Independent   Sit to supine: Independent   Rolling: Independent   Scooting: Independent    Transfer Training:   Sit to stand:Supervision or Set-up Assistance with Rolling Walker .  Bed <> Chair:  Step Transfer with Supervision or Set-up Assistance with Rolling Walker .  Toilet Transfer:  Pt Step Transfer with Supervision or Set-up Assistance with Grab bars .    Toilet Training:  Pt performed toileting with Supervision or Set-up Assistance with Grab  bar at [employer].    Balance:   Static Sit: GOOD: Maintains balance through MODERATE excursions of active trunk movement  Dynamic Sit:  GOOD: Maintains balance through MODERATE excursions of active trunk movement  Static Stand: GOOD-: Takes MODERATE challenges from all directions inconsistently  Dynamic stand: GOOD-: Needs SUPERVISION only during gait and able to self right with moderate LOB inconsistently    Patient left sitting edge of bed with call button in reach and nurse notified    ASSESSMENT:  Pt demonstrated improved energy for task / endurance as noted by increased activity tolerance of 12 min intervals with moderate activity while standing allowing for increased performance with functional tasks and meaningful activities.     Rehab potential is good    Activity tolerance: Good    Discharge recommendations: other (see comments) (To be further determined based on progress prior to discharge.)     Equipment recommendations: none      GOALS:   Multidisciplinary Problems       Occupational Therapy Goals          Problem: Occupational Therapy    Goal Priority Disciplines Outcome Interventions   Occupational Therapy Goal     OT, PT/OT     Description: Long Term Goals to be met by: 11/23/22     Patient will increase functional independence with ADLs by performing:    Feeding with [place].  UE Dressing with Modified [place].  LE Dressing with Modified [place].  Grooming while standing at sink with Modified [place].  Toileting from toilet with Modified [place] for hygiene and clothing management.   Bathing from  shower chair/bench with Modified [place].  Step transfer with Modified [place]  Toilet transfer to toilet with Modified [place].  Increased functional strength to 4+/5 for bilateral UE's.                         Plan:  Patient to be seen 5 x/week (90 min per day, for 14 days) to address the above listed problems via self-care/home management, community/work re-entry, therapeutic activities, therapeutic exercises  Plan of Care expires: 11/23/22  Plan of Care reviewed with: patient         11/15/2022

## 2022-11-15 NOTE — PLAN OF CARE
Recommendations  1. Rec'd Cardiac diet.   2. Rec'd Tristan BID to promote wound healing and provide 192kcal and 5g protein. 3. Rec'd ONS: Vanilla Ensure Enlive BID to provide 700kcal and 40g protein.   4. RD to follow and make rec's accordingly.  Goals: 1. Pt will consume > 75% EEN via PO intake by next RD follow up.  Nutrition Goal Status: progressing towards goal

## 2022-11-16 LAB
ALBUMIN SERPL BCP-MCNC: 3.4 G/DL (ref 3.5–5.2)
ALP SERPL-CCNC: 66 U/L (ref 55–135)
ALT SERPL W/O P-5'-P-CCNC: 29 U/L (ref 10–44)
ANION GAP SERPL CALC-SCNC: 2 MMOL/L (ref 8–16)
AST SERPL-CCNC: 23 U/L (ref 10–40)
BASOPHILS # BLD AUTO: 0.07 K/UL (ref 0–0.2)
BASOPHILS NFR BLD: 1.1 % (ref 0–1.9)
BILIRUB SERPL-MCNC: 0.4 MG/DL (ref 0.1–1)
BUN SERPL-MCNC: 20 MG/DL (ref 8–23)
CALCIUM SERPL-MCNC: 9.8 MG/DL (ref 8.7–10.5)
CHLORIDE SERPL-SCNC: 104 MMOL/L (ref 95–110)
CO2 SERPL-SCNC: 34 MMOL/L (ref 23–29)
CREAT SERPL-MCNC: 0.9 MG/DL (ref 0.5–1.4)
DIFFERENTIAL METHOD: ABNORMAL
EOSINOPHIL # BLD AUTO: 0.2 K/UL (ref 0–0.5)
EOSINOPHIL NFR BLD: 3.2 % (ref 0–8)
ERYTHROCYTE [DISTWIDTH] IN BLOOD BY AUTOMATED COUNT: 11.9 % (ref 11.5–14.5)
EST. GFR  (NO RACE VARIABLE): >60 ML/MIN/1.73 M^2
GLUCOSE SERPL-MCNC: 90 MG/DL (ref 70–110)
HCT VFR BLD AUTO: 33.1 % (ref 37–48.5)
HGB BLD-MCNC: 11 G/DL (ref 12–16)
IMM GRANULOCYTES # BLD AUTO: 0.06 K/UL (ref 0–0.04)
IMM GRANULOCYTES NFR BLD AUTO: 0.9 % (ref 0–0.5)
LYMPHOCYTES # BLD AUTO: 1.5 K/UL (ref 1–4.8)
LYMPHOCYTES NFR BLD: 23.2 % (ref 18–48)
MCH RBC QN AUTO: 32 PG (ref 27–31)
MCHC RBC AUTO-ENTMCNC: 33.2 G/DL (ref 32–36)
MCV RBC AUTO: 96 FL (ref 82–98)
MONOCYTES # BLD AUTO: 0.7 K/UL (ref 0.3–1)
MONOCYTES NFR BLD: 10.8 % (ref 4–15)
NEUTROPHILS # BLD AUTO: 4 K/UL (ref 1.8–7.7)
NEUTROPHILS NFR BLD: 60.8 % (ref 38–73)
NRBC BLD-RTO: 0 /100 WBC
PLATELET # BLD AUTO: 441 K/UL (ref 150–450)
PMV BLD AUTO: 9.6 FL (ref 9.2–12.9)
POTASSIUM SERPL-SCNC: 4.3 MMOL/L (ref 3.5–5.1)
PROT SERPL-MCNC: 7.3 G/DL (ref 6–8.4)
RBC # BLD AUTO: 3.44 M/UL (ref 4–5.4)
SODIUM SERPL-SCNC: 140 MMOL/L (ref 136–145)
WBC # BLD AUTO: 6.59 K/UL (ref 3.9–12.7)

## 2022-11-16 PROCEDURE — 97116 GAIT TRAINING THERAPY: CPT

## 2022-11-16 PROCEDURE — 25000003 PHARM REV CODE 250: Performed by: INTERNAL MEDICINE

## 2022-11-16 PROCEDURE — 80053 COMPREHEN METABOLIC PANEL: CPT | Performed by: INTERNAL MEDICINE

## 2022-11-16 PROCEDURE — 97110 THERAPEUTIC EXERCISES: CPT

## 2022-11-16 PROCEDURE — 97535 SELF CARE MNGMENT TRAINING: CPT

## 2022-11-16 PROCEDURE — 11800000 HC REHAB PRIVATE ROOM

## 2022-11-16 PROCEDURE — 85025 COMPLETE CBC W/AUTO DIFF WBC: CPT | Performed by: INTERNAL MEDICINE

## 2022-11-16 PROCEDURE — 36415 COLL VENOUS BLD VENIPUNCTURE: CPT | Performed by: INTERNAL MEDICINE

## 2022-11-16 PROCEDURE — 63600175 PHARM REV CODE 636 W HCPCS: Performed by: INTERNAL MEDICINE

## 2022-11-16 PROCEDURE — 97530 THERAPEUTIC ACTIVITIES: CPT

## 2022-11-16 RX ADMIN — HYDROXYCHLOROQUINE SULFATE 200 MG: 200 TABLET ORAL at 09:11

## 2022-11-16 RX ADMIN — DOCUSATE SODIUM 100 MG: 100 CAPSULE, LIQUID FILLED ORAL at 08:11

## 2022-11-16 RX ADMIN — CIPROFLOXACIN 250 MG: 250 TABLET, COATED ORAL at 09:11

## 2022-11-16 RX ADMIN — FAMOTIDINE 20 MG: 20 TABLET, FILM COATED ORAL at 09:11

## 2022-11-16 RX ADMIN — MIRABEGRON 50 MG: 50 TABLET, FILM COATED, EXTENDED RELEASE ORAL at 09:11

## 2022-11-16 RX ADMIN — PREDNISONE 2 MG: 1 TABLET ORAL at 09:11

## 2022-11-16 RX ADMIN — CIPROFLOXACIN 250 MG: 250 TABLET, COATED ORAL at 08:11

## 2022-11-16 RX ADMIN — LOSARTAN POTASSIUM 100 MG: 50 TABLET, FILM COATED ORAL at 09:11

## 2022-11-16 RX ADMIN — LEVOTHYROXINE SODIUM 88 MCG: 88 TABLET ORAL at 05:11

## 2022-11-16 RX ADMIN — ATORVASTATIN CALCIUM 40 MG: 40 TABLET, FILM COATED ORAL at 09:11

## 2022-11-16 RX ADMIN — PRAMIPEXOLE DIHYDROCHLORIDE 0.25 MG: 0.12 TABLET ORAL at 08:11

## 2022-11-16 NOTE — SUBJECTIVE & OBJECTIVE
Past Medical History:   Diagnosis Date    Acute cystitis without hematuria 2021    Altered mental status 2021    Anemia 2021    Anxiety     Arthritis     Arthritis, rheumatoid     Bell's palsy     Bursitis of left shoulder     Carpal tunnel syndrome, bilateral     Debility 2021    Elevated troponin 2021    Erosive osteoarthritis of both hands     Esophagus disorder     needs to have her esophagus stretched again    Fall 2021    Function kidney decreased     GERD (gastroesophageal reflux disease)     High cholesterol     Hypertension     Hypokalemia 2021    Hypothyroidism     Kyphosis of cervical region, unspecified kyphosis type     Lumbar pain     Migraine headache     Postmenopausal disorder     Shingles     Thrombocytopenia     Thyroid disease     Unspecified cataract     Weakness 2021       Past Surgical History:   Procedure Laterality Date    BACK SURGERY      BLADDER SURGERY  2006    Dr Robert in [place] stem cell implant to bladder    CATARACT EXTRACTION Left     Dr Barroso 2016     SECTION      COLONOSCOPY  10/2012    Dr Corbett    ESOPHAGOGASTRODUODENOSCOPY  2021    schatzkis ring - dilated, gastroduodenitis, bile reflux - pickard    HYSTERECTOMY      NOSE SURGERY  2021    Dr De Souza in [place] removal of skin cancer    OOPHORECTOMY  1977    TONSILLECTOMY         Review of patient's allergies indicates:   Allergen Reactions    Ace inhibitors Anaphylaxis     cough    Belladonna alkaloids Anaphylaxis     Other reaction(s): Low Platelet Count    Librax (with clidinium) [chlordiazepoxide-clidinium] Anaphylaxis    Methotrexate analogues Anaphylaxis and Nausea And Vomiting    Codeine     Codeine sulfate      Other reaction(s): Rash, dizziness    Cymbalta [duloxetine] Hallucinations    Methotrexate sodium      Other reaction(s): wt loss, n/v    Solifenacin Hallucinations       No current facility-administered medications on file prior to encounter.      Current Outpatient Medications on File Prior to Encounter   Medication Sig    acetaminophen (TYLENOL) 325 MG tablet Take 2 tablets (650 mg total) by mouth every 8 (eight) hours as needed for Temperature greater than (or equal to 101 degree F).    cholecalciferol, vitamin D3, (VITAMIN D3) 50 mcg (2,000 unit) Cap Take 5,000 Units by mouth once daily. 5,000un qd    docusate sodium (COLACE) 100 MG capsule Take 100 mg by mouth nightly.    famotidine (PEPCID) 20 MG tablet Take 1 tablet (20 mg total) by mouth 2 (two) times daily.    hydrOXYchloroQUINE (PLAQUENIL) 200 mg tablet Take 200 mg by mouth every other day. Every other day    levothyroxine (SYNTHROID) 88 MCG tablet TAKE 1 TABLET BY MOUTH EVERY DAY    mirabegron (MYRBETRIQ) 50 mg Tb24 Take 1 tablet (50 mg total) by mouth once daily.    multivit-min/iron/folic/lutein (CENTRUM SILVER WOMEN ORAL) Take by mouth.    nitrofurantoin (MACRODANTIN) 50 MG capsule Take 50 mg by mouth nightly.    pramipexole (MIRAPEX) 0.25 MG tablet SMARTSI Tablet(s) By Mouth Every Evening    predniSONE (DELTASONE) 1 MG tablet Take 1 mg by mouth 2 (two) times daily.    simvastatin (ZOCOR) 40 MG tablet Take 1 tablet (40 mg total) by mouth every evening.    [DISCONTINUED] losartan (COZAAR) 50 MG tablet Take 1 tablet (50 mg total) by mouth once daily.    cetirizine (ZYRTEC) 10 MG tablet Take 1 tablet (10 mg total) by mouth once daily. (Patient taking differently: Take 10 mg by mouth daily as needed.)    ID NOW COVID-19 TEST KIT Kit TEST AS DIRECTED TODAY    triamcinolone acetonide 0.1% (KENALOG) 0.1 % cream Apply topically 2 (two) times daily.     Family History       Problem Relation (Age of Onset)    Cancer Mother, Maternal Aunt    Heart attack Father    Heart disease Father    Stroke Maternal Aunt          Tobacco Use    Smoking status: Never    Smokeless tobacco: Never   Substance and Sexual Activity    Alcohol use: Never    Drug use: Never    Sexual activity: Not Currently     Review of  Systems   Constitutional:  Positive for activity change. Negative for chills and fever.   HENT:  Negative for ear pain, sinus pain, sore throat and trouble swallowing.         Mouth pain   Eyes:  Negative for pain and redness.   Respiratory:  Negative for cough, chest tightness, shortness of breath and wheezing.    Cardiovascular:  Negative for chest pain, palpitations and leg swelling.   Gastrointestinal:  Negative for abdominal pain, blood in stool, constipation, nausea and vomiting.   Genitourinary:  Negative for difficulty urinating and dysuria.   Musculoskeletal:  Positive for arthralgias, back pain, gait problem and myalgias.   Skin:  Negative for rash and wound.   Neurological:  Positive for weakness. Negative for seizures and syncope.   Psychiatric/Behavioral:  Negative for agitation, behavioral problems and confusion.    Objective:     Vital Signs (Most Recent):  Temp: 97.2 °F (36.2 °C) (11/15/22 1717)  Pulse: 65 (11/15/22 1717)  Resp: 18 (11/15/22 1717)  BP: (!) 122/56 (11/15/22 1717)  SpO2: 99 % (11/15/22 1717)   Vital Signs (24h Range):  Temp:  [97.2 °F (36.2 °C)-98.2 °F (36.8 °C)] 97.2 °F (36.2 °C)  Pulse:  [65-77] 65  Resp:  [18-20] 18  SpO2:  [94 %-99 %] 99 %  BP: (122-180)/(56-79) 122/56     Weight: 45.4 kg (100 lb 1.6 oz)  Body mass index is 16.16 kg/m².    Physical Exam  Constitutional:       Appearance: Normal appearance.      Comments: Thin female   HENT:      Nose: Nose normal.      Mouth/Throat:      Mouth: Mucous membranes are moist.   Eyes:      Extraocular Movements: Extraocular movements intact.      Pupils: Pupils are equal, round, and reactive to light.   Cardiovascular:      Rate and Rhythm: Normal rate and regular rhythm.   Pulmonary:      Effort: Pulmonary effort is normal.      Breath sounds: Normal breath sounds.   Abdominal:      General: Bowel sounds are normal. There is no distension.      Palpations: Abdomen is soft.      Tenderness: There is no abdominal tenderness.    Musculoskeletal:      Cervical back: Normal range of motion and neck supple.      Comments: Arthritic changes to hands noted, kyphosis of upper back noted   Skin:     General: Skin is warm and dry.   Neurological:      General: No focal deficit present.      Mental Status: She is alert and oriented to person, place, and time.   Psychiatric:         Mood and Affect: Mood normal.         Behavior: Behavior normal.         CRANIAL NERVES     CN III, IV, VI   Pupils are equal, round, and reactive to light.     Significant Labs: All pertinent labs within the past 24 hours have been reviewed.  Recent Lab Results       None            Significant Imaging: I have reviewed all pertinent imaging results/findings within the past 24 hours.

## 2022-11-16 NOTE — PT/OT/SLP PROGRESS
Occupational Therapy  Treatment    Katarzyna Warner   MRN: [id number]   Admitting Diagnosis: Debility    OT Date of Treatment: 11/16/22   OT Start Time: 0800  OT Stop Time: 0930  OT Total Time (min): 90 min    Treatment Type: Individual 60 and Concurrent 30     Billable Minutes:  Self Care/Home Management 60 and Therapeutic Exercise 30  Total Minutes: 90     OT/TAL: OT          General Precautions: Standard, fall  Orthopedic Precautions: N/A  Braces:      Spiritual, Cultural Beliefs, Orthodox Practices, Values that Affect Care: no    Subjective:  Communicated with nurse prior to session.    Pain/Comfort  Pain Rating 1: 0/10  Pain Rating Post-Intervention 1: 0/10    Objective:  Pt was cooperative and motivated without verbal encouragement while exhibiting positive affect. She participated in continued ADL retraining as further noted below emphasizing fall prevention, and use of assistive devices and compensatory strategies providing extra time demonstrating modified independence. Pt then provided continued education / instruction regarding home exercise program to be completed each day when not participating in skilled therapy in order to increase / sustain bilateral UE strength needed to perform functional tasks of ADL's. She participated in therapeutic exercise performing 3x15 bilateral UE strengthening exercises utilizing heavy resistance theraband with scapular retraction, shoulder extension, shoulder adduction, horizontal abduction, shoulder flexion in plane of scaption, internal rotation, external rotation, and elbow flexion requiring min verbal and tactile cueing for technique emphasizing quality of movement. Pt provided HEP handout with heavy resistance theraband.     Functional Mobility:  Bed Mobility:   Supine to sit: Independent   Sit to supine: Independent   Rolling: Independent   Scooting: Independent    Transfer Training:   Sit to stand:Modified Independent with Rolling Walker .  Bed <> Chair:  Step  Transfer with Modified Independent with Rolling Walker .  Toilet Transfer:  Pt Step Transfer with Modified Independent with Grab bars .  Patient performed shower transfer Step Transfer with Modified Independent with Rolling Walker and Grab bars.    Feeding:  Patient performed feeding with Independent.    Grooming:  Patient peformed hand washing with Modified Independent at standing at sink.  Patient performed face washing with Modified Independent at standing at sink.  Patient performed oral hygeine with Modified Independent at standing at sink.  Patient performe hair grooming with Modified Independent at standing at sink.    Bathing:  Patient performed bathing with Modified Independent with grab bar, Handheld shower head, and shower chair at Shower.    UE Dressing:  Patient performed UE Dressing with Modified Independent with extra time at Edge of bed.    LE Dressing:  Patient don/doffed socks with Modified Independent, Patient don/doffed shoes with Modified Independent, Patient performed don/doffed adult brief with Modified Independent, and Patient performed don/doffed pants with Modified Independent    Toilet Training:  Pt performed toileting with Modified [facility]    Balance:   Static Sit: NORMAL: No deviations seen in posture held statically  Dynamic Sit:  GOOD: Maintains balance through MODERATE excursions of active trunk movement  Static Stand: GOOD: Takes MODERATE challenges from all directions  Dynamic stand: GOOD: Needs SUPERVISION only during gait and able to self right with moderate LOB      Patient left sitting edge of bed with call button in reach and nurse notified    ASSESSMENT:  Pt continues to demonstrate good progress with functional goals as noted by continued changes in functional scores in which patient able to achieve 7/7 STG's and 9/9 LTG's. Pt now modified independent with ADL's including functional mobility with extra time, and use of assistive device and  compensatory strategies. She exhibits increased strength in bilateral UE's to 4+ / 5 manual muscle grades allowing for improved functional performance with ADL's while also demonstrating improved static / dynamic sitting balance to NORMAL / GOOD and static / dynamic standing balance to GOOD impacting safety and fall risk. Pt now safe to transition home alone.     Rehab potential is good    Activity tolerance: Good    Discharge recommendations: other (see comments) (To be further determined based on progress prior to discharge.)     Equipment recommendations: none     GOALS:   Short Term Goals to be met by: 11/16/22      Patient will increase functional independence with ADLs by performing:     UE Dressing with Set-up Assistance. MET  LE Dressing with Supervision. MET  Toileting from toilet with Supervision for hygiene and clothing management. MET  Bathing from  shower chair/bench with Supervision. MET  Step transfer with Supervision. MET  Toilet transfer to toilet with Supervision. MET  Increased functional strength to 4- to 4/5 for bilateral UE's. MET     Long Term Goals to be met by: 11/23/22      Patient will increase functional independence with ADLs by performing:     Feeding with [place]. MET  UE Dressing with Modified [place]. MET  LE Dressing with Modified [place]. MET  Grooming while standing at sink with Modified [place]. MET  Toileting from toilet with Modified [place] for hygiene and clothing management. MET  Bathing from  shower chair/bench with Modified [place]. MET  Step transfer with Modified [place]. MET  Toilet transfer to toilet with Modified [place]. MET  Increased functional strength to 4+/5 for bilateral UE's. MET      Plan:  Patient to be seen 5 x/week (90 min per day, for 14 days) to address the above listed problems via self-care/home management, community/work re-entry, therapeutic activities, therapeutic exercises  Plan of Care expires:  11/23/22  Plan of Care reviewed with: patient         11/16/2022

## 2022-11-16 NOTE — SUBJECTIVE & OBJECTIVE
Past Medical History:   Diagnosis Date    Acute cystitis without hematuria 2021    Altered mental status 2021    Anemia 2021    Anxiety     Arthritis     Arthritis, rheumatoid     Bell's palsy     Bursitis of left shoulder     Carpal tunnel syndrome, bilateral     Debility 2021    Elevated troponin 2021    Erosive osteoarthritis of both hands     Esophagus disorder     needs to have her esophagus stretched again    Fall 2021    Function kidney decreased     GERD (gastroesophageal reflux disease)     High cholesterol     Hypertension     Hypokalemia 2021    Hypothyroidism     Kyphosis of cervical region, unspecified kyphosis type     Lumbar pain     Migraine headache     Postmenopausal disorder     Shingles     Thrombocytopenia     Thyroid disease     Unspecified cataract     Weakness 2021       Past Surgical History:   Procedure Laterality Date    BACK SURGERY      BLADDER SURGERY  2006    Dr Robert in [place] stem cell implant to bladder    CATARACT EXTRACTION Left     Dr Barroso 2016     SECTION      COLONOSCOPY  10/2012    Dr Corbett    ESOPHAGOGASTRODUODENOSCOPY  2021    schatzkis ring - dilated, gastroduodenitis, bile reflux - pickard    HYSTERECTOMY      NOSE SURGERY  2021    Dr De Souza in [place] removal of skin cancer    OOPHORECTOMY  1977    TONSILLECTOMY         Review of patient's allergies indicates:   Allergen Reactions    Ace inhibitors Anaphylaxis     cough    Belladonna alkaloids Anaphylaxis     Other reaction(s): Low Platelet Count    Librax (with clidinium) [chlordiazepoxide-clidinium] Anaphylaxis    Methotrexate analogues Anaphylaxis and Nausea And Vomiting    Codeine     Codeine sulfate      Other reaction(s): Rash, dizziness    Cymbalta [duloxetine] Hallucinations    Methotrexate sodium      Other reaction(s): wt loss, n/v    Solifenacin Hallucinations       No current facility-administered medications on file prior to encounter.      Current Outpatient Medications on File Prior to Encounter   Medication Sig    acetaminophen (TYLENOL) 325 MG tablet Take 2 tablets (650 mg total) by mouth every 8 (eight) hours as needed for Temperature greater than (or equal to 101 degree F).    cholecalciferol, vitamin D3, (VITAMIN D3) 50 mcg (2,000 unit) Cap Take 5,000 Units by mouth once daily. 5,000un qd    docusate sodium (COLACE) 100 MG capsule Take 100 mg by mouth nightly.    famotidine (PEPCID) 20 MG tablet Take 1 tablet (20 mg total) by mouth 2 (two) times daily.    hydrOXYchloroQUINE (PLAQUENIL) 200 mg tablet Take 200 mg by mouth every other day. Every other day    levothyroxine (SYNTHROID) 88 MCG tablet TAKE 1 TABLET BY MOUTH EVERY DAY    mirabegron (MYRBETRIQ) 50 mg Tb24 Take 1 tablet (50 mg total) by mouth once daily.    multivit-min/iron/folic/lutein (CENTRUM SILVER WOMEN ORAL) Take by mouth.    nitrofurantoin (MACRODANTIN) 50 MG capsule Take 50 mg by mouth nightly.    pramipexole (MIRAPEX) 0.25 MG tablet SMARTSI Tablet(s) By Mouth Every Evening    predniSONE (DELTASONE) 1 MG tablet Take 1 mg by mouth 2 (two) times daily.    simvastatin (ZOCOR) 40 MG tablet Take 1 tablet (40 mg total) by mouth every evening.    cetirizine (ZYRTEC) 10 MG tablet Take 1 tablet (10 mg total) by mouth once daily. (Patient taking differently: Take 10 mg by mouth daily as needed.)    ID NOW COVID-19 TEST KIT Kit TEST AS DIRECTED TODAY    triamcinolone acetonide 0.1% (KENALOG) 0.1 % cream Apply topically 2 (two) times daily.     Family History       Problem Relation (Age of Onset)    Cancer Mother, Maternal Aunt    Heart attack Father    Heart disease Father    Stroke Maternal Aunt          Tobacco Use    Smoking status: Never    Smokeless tobacco: Never   Substance and Sexual Activity    Alcohol use: Never    Drug use: Never    Sexual activity: Not Currently     Review of Systems   Constitutional:  Positive for activity change. Negative for chills and fever.   HENT:   Negative for ear pain, sinus pain, sore throat and trouble swallowing.         Mouth pain   Eyes:  Negative for pain and redness.   Respiratory:  Negative for cough, chest tightness, shortness of breath and wheezing.    Cardiovascular:  Negative for chest pain, palpitations and leg swelling.   Gastrointestinal:  Negative for abdominal pain, blood in stool, constipation, nausea and vomiting.   Genitourinary:  Negative for difficulty urinating and dysuria.   Musculoskeletal:  Positive for arthralgias, back pain, gait problem and myalgias.   Skin:  Negative for rash and wound.   Neurological:  Positive for weakness. Negative for seizures and syncope.   Psychiatric/Behavioral:  Negative for agitation, behavioral problems and confusion.    Objective:     Vital Signs (Most Recent):  Temp: 96.9 °F (36.1 °C) (11/16/22 0520)  Pulse: 67 (11/16/22 0520)  Resp: 18 (11/16/22 0520)  BP: (!) 176/79 (11/16/22 0520)  SpO2: 97 % (11/16/22 0520)   Vital Signs (24h Range):  Temp:  [96.9 °F (36.1 °C)-97.2 °F (36.2 °C)] 96.9 °F (36.1 °C)  Pulse:  [65-67] 67  Resp:  [18] 18  SpO2:  [97 %-99 %] 97 %  BP: (122-176)/(56-79) 176/79     Weight: 45.4 kg (100 lb 1.6 oz)  Body mass index is 16.16 kg/m².    Physical Exam  Constitutional:       Appearance: Normal appearance.      Comments: Thin female   HENT:      Nose: Nose normal.      Mouth/Throat:      Mouth: Mucous membranes are moist.   Eyes:      Extraocular Movements: Extraocular movements intact.      Pupils: Pupils are equal, round, and reactive to light.   Cardiovascular:      Rate and Rhythm: Normal rate and regular rhythm.   Pulmonary:      Effort: Pulmonary effort is normal.      Breath sounds: Normal breath sounds.   Abdominal:      General: Bowel sounds are normal. There is no distension.      Palpations: Abdomen is soft.      Tenderness: There is no abdominal tenderness.   Musculoskeletal:      Cervical back: Normal range of motion and neck supple.      Comments: Arthritic changes  to hands noted, kyphosis of upper back noted   Skin:     General: Skin is warm and dry.   Neurological:      General: No focal deficit present.      Mental Status: She is alert and oriented to person, place, and time.   Psychiatric:         Mood and Affect: Mood normal.         Behavior: Behavior normal.         CRANIAL NERVES     CN III, IV, VI   Pupils are equal, round, and reactive to light.     Significant Labs: All pertinent labs within the past 24 hours have been reviewed.  Recent Lab Results         11/16/22  0619        Albumin 3.4       Alkaline Phosphatase 66       ALT 29       Anion Gap 2       AST 23       Baso # 0.07       Basophil % 1.1       BILIRUBIN TOTAL 0.4  Comment: For infants and newborns, interpretation of results should be based  on gestational age, weight and in agreement with clinical  observations.    Premature Infant recommended reference ranges:  Up to 24 hours.............<8.0 mg/dL  Up to 48 hours............<12.0 mg/dL  3-5 days..................<15.0 mg/dL  6-29 days.................<15.0 mg/dL    For patients on Eltrombopag therapy, use of Dimension [place] TBIL is   not   recommended.         BUN 20       Calcium 9.8       Chloride 104       CO2 34       Creatinine 0.9       Differential Method Automated       eGFR >60.0       Eos # 0.2       Eosinophil % 3.2       Glucose 90       Gran # (ANC) 4.0       Gran % 60.8       Hematocrit 33.1       Hemoglobin 11.0       Immature Grans (Abs) 0.06  Comment: Mild elevation in immature granulocytes is non specific and   can be seen in a variety of conditions including stress response,   acute inflammation, trauma and pregnancy. Correlation with other   laboratory and clinical findings is essential.         Immature Granulocytes 0.9       Lymph # 1.5       Lymph % 23.2       MCH 32.0       MCHC 33.2       MCV 96       Mono # 0.7       Mono % 10.8       MPV 9.6       nRBC 0       Platelets 441       Potassium 4.3       PROTEIN TOTAL 7.3        RBC 3.44       RDW 11.9       Sodium 140       WBC 6.59               Significant Imaging: I have reviewed all pertinent imaging results/findings within the past 24 hours.

## 2022-11-16 NOTE — PT/OT/SLP PROGRESS
"Physical Therapy         Treatment        Katarzyna Warner   MRN: [id number]     PT Received On: 11/16/22  PT Start Time: 1215     PT Stop Time: 1345    PT Total Time (min): 90 min       Billable Minutes:  Gait [id number], Therapeutic Activity 30, and Therapeutic Exercise 30  Total Minutes: 90    Treatment Type: Treatment  PT/PTA: PT             General Precautions: Standard,    Orthopedic Precautions:     Braces:           Subjective:  Communicated with nurse prior to session.         Objective:  Patient found in bed, with      Functional Mobility:  Bed Mobility:   Supine to sit: Independent   Sit to supine: Independent   Rolling: Independent   Scooting: Independent    Balance:   Static Sit: GOOD: Takes MODERATE challenges from all directions  Dynamic Sit:  GOOD: Maintains balance through MODERATE excursions of active trunk movement  Static Stand: GOOD: Takes MODERATE challenges from all directions  Dynamic stand: GOOD-: Needs SUPERVISION only during gait and able to self right with moderate LOB inconsistently    Transfer Training:  Sit to stand:Modified Independent with Rolling Walker    Bed <> Chair:  Step Transfer with Modified Independent with Rolling Walker      Wheelchair Training:  Wc mobility 150ft with ind    Gait Training:  Pt ambulated 200ft x3 attempts with rw mod ind    Stair Training:  Pt went up and down 12 steps with supervision      Additional Treatment:  Nustep x 15 min followed by toe taps on 6 '" step 3 sets 15 reps with arms folded    Activity Tolerance:  Patient tolerated treatment well    Patient left supine with call button in reach.    Assessment:  Katarzyna Warner is a 87 y.o. female with a medical diagnosis of Debility. She presents with pt is now ind with all fxnl mobility and will be dc tomorrow.    Rehab potential is good.    Activity tolerance: Good    Discharge recommendations:       Equipment recommendations:       GOALS:   Multidisciplinary Problems       Physical Therapy Goals       "    Problem: Physical Therapy    Goal Priority Disciplines Outcome Goal Variances Interventions   Physical Therapy Goal     PT, PT/OT      Description: Short term goals to be made 22    Patient will increase functional independence with mobility by performin. Supine to sit with SBA  2. Sit to supine with SBA  3. Sit to stand transfer with Stand-by Assistance  4. Bed to chair transfer with Supervision using Rolling Walker  5. Gait  x 150 feet with Supervision using Rolling Walker.   6. Ascend/descend 8 stair with bilateral Handrails supervision    Long term goals to be met by 22 .     1. Supine to sit with modified independent  2. Sit to supine with modified independent   3. Sit to stand transfer with modified independence  4. Bed to chair transfer with modified independence using Rolling Walker  5. Gait  x 150 feet with modified independence using Rolling Walker.   6. Ascend/descend 12 stair with bilateral Handrails modified independence                         PLAN:    Patient to be seen    to address the above listed problems via    Plan of Care expires:    Plan of Care reviewed with: patient         2022

## 2022-11-16 NOTE — PLAN OF CARE
11/16/22 1026   Medicare Message   Important Message from Medicare regarding Discharge Appeal Rights Given to patient/caregiver;Explained to patient/caregiver;Signed/date by patient/caregiver   Date IMM was signed 11/16/22   Time IMM was signed 1021

## 2022-11-16 NOTE — PROGRESS NOTES
[facility] Medicine  Progress Note    Patient Name: Katarzyna Warner  MRN: [id number]  Patient Class: IP- Rehab   Admission Date: 11/9/2022  Length of Stay: 8 days  Attending Physician: Ayo Nolen III, MD  Primary Care Provider: Juliet Barclay MD        Subjective:     Principal Problem:Debility        HPI:    87-year-old female with history of anemia anxiety, chronic arthritis, hypertension and previous UTIs presented to the emergency department on 11/7/22 with nausea, frequent urination and generalized weakness.  Chest x-ray showed no acute changes vital signs were stable and patient was admitted for observation for IV antibiotics and fluids for acute cystitis without hematuria.  On 11/8 she developed a fever and was lethargic and chills and was very weak.  Therapy was started to help with the weakness and on 11/09 her acute medical needs were addressed and she was transferred to inpatient rehab for ongoing management of debility.  Prior to hospital admission, patient lived alone and was independent utilizing straight cane.  Currently patient is standby assistance for functional mobility and ambulating 80 ft with CGA with a rolling walker and Min to mod A for ADLs.  She had significant decline in functional ADLs and would benefit from multidisciplinary approach to regain strength, endurance and safety awareness.  She continues to need 24 hour medical management for medication maintenance and adjustments, lab monitoring, IV care, accurate I's and nose, monitoring of skin integrity, respiratory monitoring, monitoring glucose levels cognition and therapy so she can return alone to her prior level function.  At this time she will benefit from admission to acute rehab unit for continuation medical supervision by Internal Medicine.  In addition admission to acute rehab unit will add treatment by multidisciplinary team to improve functional status prior to  discharge.      Overview/Hospital Course:  11/11/22 DL:  Patient feeling well today.  Patient states she has been working well with therapy.  Denies pain.  Patient states she is eager to get stronger and return home.  Patient encouraged to continue therapy efforts.    11/14/22 DL:  Patient doing well today.  Patient states she has been working well with therapy and feels like she is getting stronger.  Patient encouraged to continue therapy efforts.  11/15 ND discussed at team metting - pt progressing to her goals  11/16/22 DL:  Patient ambulating in lin with walker.  Patient doing well with therapy.  States she is feeling stronger every day and is looking forward to going home possibly tomorrow.  Patient encouraged to continue therapy efforts      No new subjective & objective note has been filed under this hospital service since the last note was generated.    Assessment/Plan:      * Debility  Cont rehab services      Hyperlipidemia, mixed  Continue statin      Hypothyroid  Cont synthroid      Acute cystitis without hematuria  Urine culture showed Klebsiella sensitive to several p.o. antibiotics;  p.o. Cipro for patient      GERD (gastroesophageal reflux disease)  Continue famotodine    Arthritis  Followed by Rheumatology takes chronic prednisone and Plaquenil      Hypertension  Monitor bp = adjust meds if needed      VTE Risk Mitigation (From admission, onward)           Ordered     IP VTE HIGH RISK PATIENT  Once         11/09/22 1318     Place sequential compression device  Until discontinued         11/09/22 1318     Place JEREMY hose  Until discontinued         11/09/22 1318                    Discharge Planning   REGINALDO: 11/9/2022     Code Status: DNR   Is the patient medically ready for discharge?:     Reason for patient still in hospital (select all that apply): Patient trending condition  Discharge Plan A: Home Health   Discharge Delays: None known at this time              Olive Marie NP  Department of  [facility] Medicine   [facility])

## 2022-11-16 NOTE — PROGRESS NOTES
[facility] Medicine  Progress Note    Patient Name: Katarzyna Warner  MRN: [id number]  Patient Class: IP- Rehab   Admission Date: 11/9/2022  Length of Stay: 6 days  Attending Physician: Ayo Nolen III, MD  Primary Care Provider: Juliet Barclay MD        Subjective:     Principal Problem:Debility        HPI:    87-year-old female with history of anemia anxiety, chronic arthritis, hypertension and previous UTIs presented to the emergency department on 11/7/22 with nausea, frequent urination and generalized weakness.  Chest x-ray showed no acute changes vital signs were stable and patient was admitted for observation for IV antibiotics and fluids for acute cystitis without hematuria.  On 11/8 she developed a fever and was lethargic and chills and was very weak.  Therapy was started to help with the weakness and on 11/09 her acute medical needs were addressed and she was transferred to inpatient rehab for ongoing management of debility.  Prior to hospital admission, patient lived alone and was independent utilizing straight cane.  Currently patient is standby assistance for functional mobility and ambulating 80 ft with CGA with a rolling walker and Min to mod A for ADLs.  She had significant decline in functional ADLs and would benefit from multidisciplinary approach to regain strength, endurance and safety awareness.  She continues to need 24 hour medical management for medication maintenance and adjustments, lab monitoring, IV care, accurate I's and nose, monitoring of skin integrity, respiratory monitoring, monitoring glucose levels cognition and therapy so she can return alone to her prior level function.  At this time she will benefit from admission to acute rehab unit for continuation medical supervision by Internal Medicine.  In addition admission to acute rehab unit will add treatment by multidisciplinary team to improve functional status prior to  discharge.      Overview/Hospital Course:  22 DL:  Patient feeling well today.  Patient states she has been working well with therapy.  Denies pain.  Patient states she is eager to get stronger and return home.  Patient encouraged to continue therapy efforts.    22 DL:  Patient doing well today.  Patient states she has been working well with therapy and feels like she is getting stronger.  Patient encouraged to continue therapy efforts.  11/15 ND discussed at team metting - pt progressing to her goals      Past Medical History:   Diagnosis Date    Acute cystitis without hematuria 2021    Altered mental status 2021    Anemia 2021    Anxiety     Arthritis     Arthritis, rheumatoid     Bell's palsy     Bursitis of left shoulder     Carpal tunnel syndrome, bilateral     Debility 2021    Elevated troponin 2021    Erosive osteoarthritis of both hands     Esophagus disorder     needs to have her esophagus stretched again    Fall 2021    Function kidney decreased     GERD (gastroesophageal reflux disease)     High cholesterol     Hypertension     Hypokalemia 2021    Hypothyroidism     Kyphosis of cervical region, unspecified kyphosis type     Lumbar pain     Migraine headache     Postmenopausal disorder     Shingles     Thrombocytopenia     Thyroid disease     Unspecified cataract     Weakness 2021       Past Surgical History:   Procedure Laterality Date    BACK SURGERY      BLADDER SURGERY  2006    Dr Robert in [place] stem cell implant to bladder    CATARACT EXTRACTION Left     Dr Barroso 2016     SECTION      COLONOSCOPY  10/2012    Dr Corbett    ESOPHAGOGASTRODUODENOSCOPY  2021    coltons ring - dilated, gastroduodenitis, bile reflux - pickard    HYSTERECTOMY      NOSE SURGERY  2021    Dr De Souza in [place] removal of skin cancer    OOPHORECTOMY      TONSILLECTOMY         Review of patient's allergies  indicates:   Allergen Reactions    Ace inhibitors Anaphylaxis     cough    Belladonna alkaloids Anaphylaxis     Other reaction(s): Low Platelet Count    Librax (with clidinium) [chlordiazepoxide-clidinium] Anaphylaxis    Methotrexate analogues Anaphylaxis and Nausea And Vomiting    Codeine     Codeine sulfate      Other reaction(s): Rash, dizziness    Cymbalta [duloxetine] Hallucinations    Methotrexate sodium      Other reaction(s): wt loss, n/v    Solifenacin Hallucinations       No current facility-administered medications on file prior to encounter.     Current Outpatient Medications on File Prior to Encounter   Medication Sig    acetaminophen (TYLENOL) 325 MG tablet Take 2 tablets (650 mg total) by mouth every 8 (eight) hours as needed for Temperature greater than (or equal to 101 degree F).    cholecalciferol, vitamin D3, (VITAMIN D3) 50 mcg (2,000 unit) Cap Take 5,000 Units by mouth once daily. 5,000un qd    docusate sodium (COLACE) 100 MG capsule Take 100 mg by mouth nightly.    famotidine (PEPCID) 20 MG tablet Take 1 tablet (20 mg total) by mouth 2 (two) times daily.    hydrOXYchloroQUINE (PLAQUENIL) 200 mg tablet Take 200 mg by mouth every other day. Every other day    levothyroxine (SYNTHROID) 88 MCG tablet TAKE 1 TABLET BY MOUTH EVERY DAY    mirabegron (MYRBETRIQ) 50 mg Tb24 Take 1 tablet (50 mg total) by mouth once daily.    multivit-min/iron/folic/lutein (CENTRUM SILVER WOMEN ORAL) Take by mouth.    nitrofurantoin (MACRODANTIN) 50 MG capsule Take 50 mg by mouth nightly.    pramipexole (MIRAPEX) 0.25 MG tablet SMARTSI Tablet(s) By Mouth Every Evening    predniSONE (DELTASONE) 1 MG tablet Take 1 mg by mouth 2 (two) times daily.    simvastatin (ZOCOR) 40 MG tablet Take 1 tablet (40 mg total) by mouth every evening.    [DISCONTINUED] losartan (COZAAR) 50 MG tablet Take 1 tablet (50 mg total) by mouth once daily.    cetirizine (ZYRTEC) 10 MG tablet Take 1 tablet (10 mg total) by  mouth once daily. (Patient taking differently: Take 10 mg by mouth daily as needed.)    ID NOW COVID-19 TEST KIT Kit TEST AS DIRECTED TODAY    triamcinolone acetonide 0.1% (KENALOG) 0.1 % cream Apply topically 2 (two) times daily.     Family History       Problem Relation (Age of Onset)    Cancer Mother, Maternal Aunt    Heart attack Father    Heart disease Father    Stroke Maternal Aunt          Tobacco Use    Smoking status: Never    Smokeless tobacco: Never   Substance and Sexual Activity    Alcohol use: Never    Drug use: Never    Sexual activity: Not Currently     Review of Systems   Constitutional:  Positive for activity change. Negative for chills and fever.   HENT:  Negative for ear pain, sinus pain, sore throat and trouble swallowing.         Mouth pain   Eyes:  Negative for pain and redness.   Respiratory:  Negative for cough, chest tightness, shortness of breath and wheezing.    Cardiovascular:  Negative for chest pain, palpitations and leg swelling.   Gastrointestinal:  Negative for abdominal pain, blood in stool, constipation, nausea and vomiting.   Genitourinary:  Negative for difficulty urinating and dysuria.   Musculoskeletal:  Positive for arthralgias, back pain, gait problem and myalgias.   Skin:  Negative for rash and wound.   Neurological:  Positive for weakness. Negative for seizures and syncope.   Psychiatric/Behavioral:  Negative for agitation, behavioral problems and confusion.    Objective:     Vital Signs (Most Recent):  Temp: 97.2 °F (36.2 °C) (11/15/22 1717)  Pulse: 65 (11/15/22 1717)  Resp: 18 (11/15/22 1717)  BP: (!) 122/56 (11/15/22 1717)  SpO2: 99 % (11/15/22 1717)   Vital Signs (24h Range):  Temp:  [97.2 °F (36.2 °C)-98.2 °F (36.8 °C)] 97.2 °F (36.2 °C)  Pulse:  [65-77] 65  Resp:  [18-20] 18  SpO2:  [94 %-99 %] 99 %  BP: (122-180)/(56-79) 122/56     Weight: 45.4 kg (100 lb 1.6 oz)  Body mass index is 16.16 kg/m².    Physical Exam  Constitutional:       Appearance: Normal  appearance.      Comments: Thin female   HENT:      Nose: Nose normal.      Mouth/Throat:      Mouth: Mucous membranes are moist.   Eyes:      Extraocular Movements: Extraocular movements intact.      Pupils: Pupils are equal, round, and reactive to light.   Cardiovascular:      Rate and Rhythm: Normal rate and regular rhythm.   Pulmonary:      Effort: Pulmonary effort is normal.      Breath sounds: Normal breath sounds.   Abdominal:      General: Bowel sounds are normal. There is no distension.      Palpations: Abdomen is soft.      Tenderness: There is no abdominal tenderness.   Musculoskeletal:      Cervical back: Normal range of motion and neck supple.      Comments: Arthritic changes to hands noted, kyphosis of upper back noted   Skin:     General: Skin is warm and dry.   Neurological:      General: No focal deficit present.      Mental Status: She is alert and oriented to person, place, and time.   Psychiatric:         Mood and Affect: Mood normal.         Behavior: Behavior normal.         CRANIAL NERVES     CN III, IV, VI   Pupils are equal, round, and reactive to light.     Significant Labs: All pertinent labs within the past 24 hours have been reviewed.  Recent Lab Results       None            Significant Imaging: I have reviewed all pertinent imaging results/findings within the past 24 hours.      Assessment/Plan:      * Debility  Cont rehab services      Hyperlipidemia, mixed  Continue statin      Hypothyroid  Cont synthroid      Acute cystitis without hematuria  Urine culture showed Klebsiella sensitive to several p.o. antibiotics;  p.o. Cipro for patient      GERD (gastroesophageal reflux disease)  Continue famotodine    Arthritis  Followed by Rheumatology takes chronic prednisone and Plaquenil      Hypertension  Monitor bp = adjust meds if needed        VTE Risk Mitigation (From admission, onward)         Ordered     IP VTE HIGH RISK PATIENT  Once         11/09/22 1318     Place sequential  compression device  Until discontinued         11/09/22 1318     Place JEREMY hose  Until discontinued         11/09/22 1318                Discharge Planning   REGINALDO: 11/9/2022     Code Status: DNR   Is the patient medically ready for discharge?:     Reason for patient still in hospital (select all that apply): Treatment  Discharge Plan A: Home Health                  Juliet Barclay MD  Department of Hospital Medicine   [facility] ([facility])

## 2022-11-17 VITALS
BODY MASS INDEX: 16.09 KG/M2 | TEMPERATURE: 98 F | OXYGEN SATURATION: 96 % | WEIGHT: 100.13 LBS | SYSTOLIC BLOOD PRESSURE: 166 MMHG | RESPIRATION RATE: 18 BRPM | HEART RATE: 70 BPM | DIASTOLIC BLOOD PRESSURE: 72 MMHG | HEIGHT: 66 IN

## 2022-11-17 PROCEDURE — 25000003 PHARM REV CODE 250: Performed by: INTERNAL MEDICINE

## 2022-11-17 PROCEDURE — 63600175 PHARM REV CODE 636 W HCPCS: Performed by: INTERNAL MEDICINE

## 2022-11-17 RX ORDER — CETIRIZINE HYDROCHLORIDE 10 MG/1
10 TABLET ORAL DAILY PRN
Start: 2022-11-17 | End: 2023-03-06 | Stop reason: SDUPTHER

## 2022-11-17 RX ORDER — GUAIFENESIN/DEXTROMETHORPHAN 100-10MG/5
10 SYRUP ORAL EVERY 4 HOURS PRN
Refills: 0
Start: 2022-11-17 | End: 2022-11-27

## 2022-11-17 RX ADMIN — ACETAMINOPHEN 650 MG: 325 TABLET ORAL at 05:11

## 2022-11-17 RX ADMIN — MIRABEGRON 50 MG: 50 TABLET, FILM COATED, EXTENDED RELEASE ORAL at 09:11

## 2022-11-17 RX ADMIN — CIPROFLOXACIN 250 MG: 250 TABLET, COATED ORAL at 09:11

## 2022-11-17 RX ADMIN — FAMOTIDINE 20 MG: 20 TABLET, FILM COATED ORAL at 09:11

## 2022-11-17 RX ADMIN — LEVOTHYROXINE SODIUM 88 MCG: 88 TABLET ORAL at 05:11

## 2022-11-17 RX ADMIN — ATORVASTATIN CALCIUM 40 MG: 40 TABLET, FILM COATED ORAL at 09:11

## 2022-11-17 RX ADMIN — PREDNISONE 2 MG: 1 TABLET ORAL at 09:11

## 2022-11-17 RX ADMIN — LOSARTAN POTASSIUM 100 MG: 50 TABLET, FILM COATED ORAL at 09:11

## 2022-11-17 NOTE — DISCHARGE SUMMARY
[facility] Medicine  Discharge Summary      Patient Name: Katarzyna Warner  MRN: [id number]  [facility]: [id number]  Patient Class: IP- Rehab  Admission Date: 11/9/2022  Hospital Length of Stay: 8 days  Discharge Date and Time:  11/17/2022 9:47 AM  Attending Physician: Ayo Nolen III, MD   Discharging Provider: Juliet Barclay MD  Primary Care Provider: Juliet Barclay MD    Primary Care Team: Networked reference to record PCT     HPI:     87-year-old female with history of anemia anxiety, chronic arthritis, hypertension and previous UTIs presented to the emergency department on 11/7/22 with nausea, frequent urination and generalized weakness.  Chest x-ray showed no acute changes vital signs were stable and patient was admitted for observation for IV antibiotics and fluids for acute cystitis without hematuria.  On 11/8 she developed a fever and was lethargic and chills and was very weak.  Therapy was started to help with the weakness and on 11/09 her acute medical needs were addressed and she was transferred to inpatient rehab for ongoing management of debility.  Prior to hospital admission, patient lived alone and was independent utilizing straight cane.  Currently patient is standby assistance for functional mobility and ambulating 80 ft with CGA with a rolling walker and Min to mod A for ADLs.  She had significant decline in functional ADLs and would benefit from multidisciplinary approach to regain strength, endurance and safety awareness.  She continues to need 24 hour medical management for medication maintenance and adjustments, lab monitoring, IV care, accurate I's and nose, monitoring of skin integrity, respiratory monitoring, monitoring glucose levels cognition and therapy so she can return alone to her prior level function.  At this time she will benefit from admission to acute rehab unit for continuation medical supervision by Internal Medicine.  In addition admission to  acute rehab unit will add treatment by multidisciplinary team to improve functional status prior to discharge.      * No surgery found *      Hospital Course:   11/11/22 DL:  Patient feeling well today.  Patient states she has been working well with therapy.  Denies pain.  Patient states she is eager to get stronger and return home.  Patient encouraged to continue therapy efforts.    11/14/22 DL:  Patient doing well today.  Patient states she has been working well with therapy and feels like she is getting stronger.  Patient encouraged to continue therapy efforts.  11/15 ND discussed at team metting - pt progressing to her goals  11/16/22 DL:  Patient ambulating in lin with walker.  Patient doing well with therapy.  States she is feeling stronger every day and is looking forward to going home possibly tomorrow.  Patient encouraged to continue therapy efforts  11/17 ND Pt doing well - made improvement in fxn in rehab - feelign better and able to care for herself at home with help of family and hh       Goals of Care Treatment Preferences:  Code Status: DNR    Living Will: Yes              Consults:   Consults (From admission, onward)        Status Ordering Provider     IP consult to case management  Once        Provider:  (Not yet assigned)    Acknowledged ARI JORDAN III          * Debility  Cont rehab services      Hyperlipidemia, mixed  Continue statin      Hypothyroid  Cont synthroid      Acute cystitis without hematuria  Urine culture showed Klebsiella sensitive to several p.o. antibiotics;  p.o. Cipro for patient  11/17 completed abxs    GERD (gastroesophageal reflux disease)  Continue famotodine    Arthritis  Followed by Rheumatology takes chronic prednisone and Plaquenil      Hypertension  Monitor bp = adjust meds if needed        Final Active Diagnoses:    Diagnosis Date Noted POA    PRINCIPAL PROBLEM:  Debility [R53.81] 11/10/2022 Yes    Hyperlipidemia, mixed [E78.2] 03/29/2022 Yes    Hypothyroid  [E03.9] 07/02/2021 Yes    Acute cystitis without hematuria [N30.00] 07/02/2021 Yes    GERD (gastroesophageal reflux disease) [K21.9] 05/27/2021 Yes    Hypertension [I10] 05/17/2021 Yes    Arthritis [M19.90] 05/17/2021 Yes      Problems Resolved During this Admission:       Discharged Condition: good    Disposition: Home-Health Care [facility]    Follow Up:   Follow-up Information     Juliet Barclay MD. Go on 12/7/2022.    Specialty: Internal Medicine  Why: Hospital follow up with Dr. Barclay on 12/07/2022 at 11:00  Contact information:  [address] [place] [id number]  [phone]             [facility] Follow up.    Specialties: Home Health Services, Home Therapy Services  Why: [facility] will contact you for initial visit.  Contact information:  [facility] [address] [id number] [phone]                     Patient Instructions:      Diet Adult Regular     Activity as tolerated       Significant Diagnostic Studies: Labs:   CMP   Recent Labs   Lab 11/16/22  0619      K 4.3      CO2 34*   GLU 90   BUN 20   CREATININE 0.9   CALCIUM 9.8   PROT 7.3   ALBUMIN 3.4*   BILITOT 0.4   ALKPHOS 66   AST 23   ALT 29   ANIONGAP 2*   , CBC   Recent Labs   Lab 11/16/22  0619   WBC 6.59   HGB 11.0*   HCT 33.1*       and All labs within the past 24 hours have been reviewed    Pending Diagnostic Studies:     None         Medications:  Reconciled Home Medications:      Medication List      START taking these medications    dextromethorphan-guaiFENesin  mg/5 ml  mg/5 mL liquid  Commonly known as: ROBITUSSIN-DM  Take 10 mLs by mouth every 4 (four) hours as needed.     losartan 100 MG tablet  Commonly known as: COZAAR  Take 1 tablet (100 mg total) by mouth once daily.        CONTINUE taking these medications    acetaminophen 325 MG tablet  Commonly known as: TYLENOL  Take 2 tablets (650 mg total) by mouth every 8 (eight) hours as needed for  Temperature greater than (or equal to 101 degree F).     CENTRUM SILVER WOMEN ORAL  Take by mouth.     cetirizine 10 MG tablet  Commonly known as: ZYRTEC  Take 1 tablet (10 mg total) by mouth daily as needed.     cholecalciferol (vitamin D3) 50 mcg (2,000 unit) Cap capsule  Commonly known as: VITAMIN D3  Take 5,000 Units by mouth once daily. 5,000un qd     docusate sodium 100 MG capsule  Commonly known as: COLACE  Take 100 mg by mouth nightly.     famotidine 20 MG tablet  Commonly known as: PEPCID  Take 1 tablet (20 mg total) by mouth 2 (two) times daily.     hydrOXYchloroQUINE 200 mg tablet  Commonly known as: PLAQUENIL  Take 200 mg by mouth every other day. Every other day     levothyroxine 88 MCG tablet  Commonly known as: SYNTHROID  TAKE 1 TABLET BY MOUTH EVERY DAY     MYRBETRIQ 50 mg Tb24  Generic drug: mirabegron  Take 1 tablet (50 mg total) by mouth once daily.     nitrofurantoin 50 MG capsule  Commonly known as: MACRODANTIN  Take 50 mg by mouth nightly.     pramipexole 0.25 MG tablet  Commonly known as: MIRAPEX  SMARTSI Tablet(s) By Mouth Every Evening     predniSONE 1 MG tablet  Commonly known as: DELTASONE  Take 1 mg by mouth 2 (two) times daily.        STOP taking these medications    ID NOW COVID-19 TEST KIT Kit  Generic drug: COVID-19 molecular test assay     triamcinolone acetonide 0.1% 0.1 % cream  Commonly known as: KENALOG        ASK your doctor about these medications    simvastatin 40 MG tablet  Commonly known as: ZOCOR  Take 1 tablet (40 mg total) by mouth every evening.            Indwelling Lines/Drains at time of discharge:   Lines/Drains/Airways     Drain  Duration           Female External Urinary Catheter 11/10/22 1900 6 days                Time spent on the discharge of patient: 35 minutes         Juliet Barclay MD  Department of Hospital Medicine  [facility]

## 2022-11-17 NOTE — ASSESSMENT & PLAN NOTE
Urine culture showed Klebsiella sensitive to several p.o. antibiotics;  p.o. Cipro for patient  11/17 completed abxs

## 2022-11-17 NOTE — PT/OT/SLP DISCHARGE
Occupational Therapy Discharge Summary    Katarzyna Warner  MRN: [id number]   Principal Problem: Debility      Patient Discharged from inpatient Occupational Therapy on 11/17/22.  Please refer to prior OT note dated 11/16/22 for functional status.    Assessment:     Pt was cooperative and motivated with minimal verbal encouragement while exhibiting positive affect during skilled OT treatment sessions including ADL retraining, functional transfer retraining, assistive device / adaptive equipment training, community reintegration, therapeutic activity, therapeutic exercise, and patient education including discharge planning and home exercise program allowing her to progress well with functional goals in which she achieved 7/7 STG's and 9/9 LTG's. Pt now modified independent with ADL's including functional mobility with extra time, and use of assistive device and compensatory strategies. She exhibited increased strength in bilateral UE's to 4+ / 5 manual muscle grades allowing for improved functional performance with ADL's while also demonstrating improved static / dynamic sitting balance to NORMAL / GOOD and static / dynamic standing balance to GOOD impacting safety and fall risk. Pt now safe to transition home alone.     Objective:     GOALS:   Short Term Goals to be met by: 11/16/22      Patient will increase functional independence with ADLs by performing:     UE Dressing with Set-up Assistance. MET  LE Dressing with Supervision. MET  Toileting from toilet with Supervision for hygiene and clothing management. MET  Bathing from  shower chair/bench with Supervision. MET  Step transfer with Supervision. MET  Toilet transfer to toilet with Supervision. MET  Increased functional strength to 4- to 4/5 for bilateral UE's. MET     Long Term Goals to be met by: 11/23/22      Patient will increase functional independence with ADLs by performing:     Feeding with [place]. MET  UE Dressing with Modified [place].  MET  LE Dressing with Modified [place]. MET  Grooming while standing at sink with Modified [place]. MET  Toileting from toilet with Modified [place] for hygiene and clothing management. MET  Bathing from  shower chair/bench with Modified [place]. MET  Step transfer with Modified [place]. MET  Toilet transfer to toilet with Modified [place]. MET  Increased functional strength to 4+/5 for bilateral UE's. MET      Reasons for Discontinuation of Therapy Services  Satisfactory goal achievement.      Plan:     Patient Discharged to: Home with Home Health Service    11/17/2022

## 2022-11-17 NOTE — PT/OT/SLP DISCHARGE
Physical Therapy Discharge Summary    Name: Katarzyna Warner  MRN: [id number]   Principal Problem: Debility     Patient Discharged from acute Physical Therapy on 11-17-22.  Please refer to prior PT noted date on 11-16-22 for functional status.     Assessment:     Pt was fully participative and was seen for gait, transfer,bed mobility, and strength training. Pt reached all of her goals. Pt will get more therapy at home    Objective:     GOALS:   . Supine to sit with SBA-met  2. Sit to supine with SBA-met  3. Sit to stand transfer with Stand-by Assistance-met  4. Bed to chair transfer with Supervision using Rolling Walker-met  5. Gait  x 150 feet with Supervision using Rolling Walker. -met  6. Ascend/descend 8 stair with bilateral Handrails supervision-met     Long term goals to be met by 11/23/22 .      1. Supine to sit with modified independent-met  2. Sit to supine with modified independent -met  3. Sit to stand transfer with modified independence-met  4. Bed to chair transfer with modified independence using Rolling Walker-met  5. Gait  x 150 feet with modified independence using Rolling Walker. -met  6. Ascend/descend 12 stair with bilateral Handrails modified independence-met         Reasons for Discontinuation of Therapy Services  Satisfactory goal achievement.      Plan:     Patient Discharged to: Home with Home Health Service.      11/17/2022

## 2022-11-17 NOTE — DISCHARGE INSTRUCTIONS
-Take all medication as instructed by your doctor.  -If symptoms return or worsen call your doctor or go to nearest ER.  -Follow all doctor orders and go to scheduled appointments.  - Home with [facility].  -Thanks for choosing [facility] with your care!!!   Resume all home meds with the increase in losartan to 100mg daily

## 2022-11-17 NOTE — NURSING
Pt was taken via wheelchair by MARIA VICTORIA Ellis to pt means  transportation.  All patients belongings was brought down by her son. Pt did not need assistance getting into the vehicle.

## 2022-12-07 PROBLEM — Z09 HOSPITAL DISCHARGE FOLLOW-UP: Status: ACTIVE | Noted: 2022-12-07

## 2022-12-07 PROBLEM — G25.81 RLS (RESTLESS LEGS SYNDROME): Status: ACTIVE | Noted: 2022-12-07

## 2023-03-06 PROBLEM — R09.82 POSTNASAL DRIP: Status: ACTIVE | Noted: 2023-03-06

## 2023-03-06 PROBLEM — R53.83 MALAISE AND FATIGUE: Status: ACTIVE | Noted: 2023-03-06

## 2023-03-06 PROBLEM — R53.81 MALAISE AND FATIGUE: Status: ACTIVE | Noted: 2023-03-06

## 2023-03-13 PROBLEM — Z09 HOSPITAL DISCHARGE FOLLOW-UP: Status: RESOLVED | Noted: 2022-12-07 | Resolved: 2023-03-13

## 2023-03-24 ENCOUNTER — LAB VISIT (OUTPATIENT)
Dept: LAB | Facility: HOSPITAL | Age: 88
End: 2023-03-24
Payer: MEDICARE

## 2023-03-24 DIAGNOSIS — N39.0 URINARY TRACT INFECTION WITHOUT HEMATURIA, SITE UNSPECIFIED: ICD-10-CM

## 2023-03-24 PROCEDURE — 87086 URINE CULTURE/COLONY COUNT: CPT

## 2023-03-26 LAB
BACTERIA UR CULT: NORMAL
BACTERIA UR CULT: NORMAL

## 2023-04-13 ENCOUNTER — LAB VISIT (OUTPATIENT)
Dept: LAB | Facility: HOSPITAL | Age: 88
End: 2023-04-13
Attending: INTERNAL MEDICINE
Payer: MEDICARE

## 2023-04-13 DIAGNOSIS — E55.9 VITAMIN D DEFICIENCY: ICD-10-CM

## 2023-04-13 DIAGNOSIS — M19.90 ARTHRITIS: ICD-10-CM

## 2023-04-13 DIAGNOSIS — I10 HYPERTENSION, UNSPECIFIED TYPE: ICD-10-CM

## 2023-04-13 DIAGNOSIS — E03.9 HYPOTHYROIDISM, UNSPECIFIED TYPE: ICD-10-CM

## 2023-04-13 DIAGNOSIS — D64.9 ANEMIA, UNSPECIFIED TYPE: ICD-10-CM

## 2023-04-13 LAB
25(OH)D3+25(OH)D2 SERPL-MCNC: 116 NG/ML (ref 30–96)
ALBUMIN SERPL BCP-MCNC: 3.7 G/DL (ref 3.5–5.2)
ALP SERPL-CCNC: 41 U/L (ref 55–135)
ALT SERPL W/O P-5'-P-CCNC: 30 U/L (ref 10–44)
ANION GAP SERPL CALC-SCNC: 0 MMOL/L (ref 8–16)
AST SERPL-CCNC: 25 U/L (ref 10–40)
BASOPHILS # BLD AUTO: 0.04 K/UL (ref 0–0.2)
BASOPHILS NFR BLD: 0.7 % (ref 0–1.9)
BILIRUB SERPL-MCNC: 0.4 MG/DL (ref 0.1–1)
BUN SERPL-MCNC: 21 MG/DL (ref 8–23)
CALCIUM SERPL-MCNC: 9.6 MG/DL (ref 8.7–10.5)
CHLORIDE SERPL-SCNC: 107 MMOL/L (ref 95–110)
CHOLEST SERPL-MCNC: 157 MG/DL (ref 120–199)
CHOLEST/HDLC SERPL: 1.7 {RATIO} (ref 2–5)
CO2 SERPL-SCNC: 34 MMOL/L (ref 23–29)
CREAT SERPL-MCNC: 0.9 MG/DL (ref 0.5–1.4)
DIFFERENTIAL METHOD: ABNORMAL
EOSINOPHIL # BLD AUTO: 0.1 K/UL (ref 0–0.5)
EOSINOPHIL NFR BLD: 1.3 % (ref 0–8)
ERYTHROCYTE [DISTWIDTH] IN BLOOD BY AUTOMATED COUNT: 12.2 % (ref 11.5–14.5)
EST. GFR  (NO RACE VARIABLE): >60 ML/MIN/1.73 M^2
GLUCOSE SERPL-MCNC: 85 MG/DL (ref 70–110)
HCT VFR BLD AUTO: 33.5 % (ref 37–48.5)
HDLC SERPL-MCNC: 91 MG/DL (ref 40–75)
HDLC SERPL: 58 % (ref 20–50)
HGB BLD-MCNC: 10.9 G/DL (ref 12–16)
IMM GRANULOCYTES # BLD AUTO: 0.01 K/UL (ref 0–0.04)
IMM GRANULOCYTES NFR BLD AUTO: 0.2 % (ref 0–0.5)
LDLC SERPL CALC-MCNC: 52.6 MG/DL (ref 63–159)
LYMPHOCYTES # BLD AUTO: 1.2 K/UL (ref 1–4.8)
LYMPHOCYTES NFR BLD: 20.9 % (ref 18–48)
MCH RBC QN AUTO: 32.4 PG (ref 27–31)
MCHC RBC AUTO-ENTMCNC: 32.5 G/DL (ref 32–36)
MCV RBC AUTO: 100 FL (ref 82–98)
MONOCYTES # BLD AUTO: 0.5 K/UL (ref 0.3–1)
MONOCYTES NFR BLD: 8.6 % (ref 4–15)
NEUTROPHILS # BLD AUTO: 3.8 K/UL (ref 1.8–7.7)
NEUTROPHILS NFR BLD: 68.3 % (ref 38–73)
NONHDLC SERPL-MCNC: 66 MG/DL
NRBC BLD-RTO: 0 /100 WBC
PLATELET # BLD AUTO: 218 K/UL (ref 150–450)
PMV BLD AUTO: 10.8 FL (ref 9.2–12.9)
POTASSIUM SERPL-SCNC: 5.1 MMOL/L (ref 3.5–5.1)
PROT SERPL-MCNC: 7 G/DL (ref 6–8.4)
RBC # BLD AUTO: 3.36 M/UL (ref 4–5.4)
SODIUM SERPL-SCNC: 141 MMOL/L (ref 136–145)
TRIGL SERPL-MCNC: 67 MG/DL (ref 30–150)
TSH SERPL DL<=0.005 MIU/L-ACNC: 2.45 UIU/ML (ref 0.4–4)
WBC # BLD AUTO: 5.55 K/UL (ref 3.9–12.7)

## 2023-04-13 PROCEDURE — 36415 COLL VENOUS BLD VENIPUNCTURE: CPT | Performed by: INTERNAL MEDICINE

## 2023-04-13 PROCEDURE — 80053 COMPREHEN METABOLIC PANEL: CPT | Performed by: INTERNAL MEDICINE

## 2023-04-13 PROCEDURE — 80061 LIPID PANEL: CPT | Performed by: INTERNAL MEDICINE

## 2023-04-13 PROCEDURE — 84443 ASSAY THYROID STIM HORMONE: CPT | Performed by: INTERNAL MEDICINE

## 2023-04-13 PROCEDURE — 85025 COMPLETE CBC W/AUTO DIFF WBC: CPT | Performed by: INTERNAL MEDICINE

## 2023-04-13 PROCEDURE — 82306 VITAMIN D 25 HYDROXY: CPT | Performed by: INTERNAL MEDICINE

## 2023-05-24 PROBLEM — J30.9 ALLERGIC RHINITIS: Status: ACTIVE | Noted: 2023-05-24

## 2023-05-24 PROBLEM — F41.9 ANXIETY DISORDER: Status: ACTIVE | Noted: 2023-05-24

## 2023-05-24 PROBLEM — M19.049 OSTEOARTHRITIS OF HAND: Status: ACTIVE | Noted: 2023-05-24

## 2023-05-24 PROBLEM — M40.202 KYPHOSIS OF CERVICAL REGION: Status: ACTIVE | Noted: 2023-05-24

## 2023-05-24 PROBLEM — R35.0 URINARY FREQUENCY: Status: ACTIVE | Noted: 2023-05-24

## 2023-05-24 PROBLEM — G56.03 CARPAL TUNNEL SYNDROME, BILATERAL: Status: ACTIVE | Noted: 2023-05-24

## 2023-05-24 PROBLEM — H26.9 CATARACT: Status: ACTIVE | Noted: 2023-05-24

## 2023-05-24 PROBLEM — M75.52 BURSITIS OF LEFT SHOULDER: Status: ACTIVE | Noted: 2023-05-24

## 2023-06-12 ENCOUNTER — HOSPITAL ENCOUNTER (EMERGENCY)
Facility: HOSPITAL | Age: 88
Discharge: HOME | End: 2023-06-12
Attending: EMERGENCY MEDICINE
Payer: MEDICARE

## 2023-06-12 VITALS
TEMPERATURE: 99 F | SYSTOLIC BLOOD PRESSURE: 179 MMHG | BODY MASS INDEX: 15.17 KG/M2 | HEART RATE: 65 BPM | WEIGHT: 94 LBS | RESPIRATION RATE: 18 BRPM | OXYGEN SATURATION: 99 % | DIASTOLIC BLOOD PRESSURE: 80 MMHG

## 2023-06-12 DIAGNOSIS — M79.5 FOREIGN BODY (FB) IN SOFT TISSUE: Primary | ICD-10-CM

## 2023-06-12 PROCEDURE — 99283 EMERGENCY DEPT VISIT LOW MDM: CPT

## 2023-06-12 PROCEDURE — 25000003 PHARM REV CODE 250: Performed by: EMERGENCY MEDICINE

## 2023-06-12 RX ORDER — CLONIDINE HYDROCHLORIDE 0.1 MG/1
0.1 TABLET ORAL
Status: COMPLETED | OUTPATIENT
Start: 2023-06-12 | End: 2023-06-12

## 2023-06-12 RX ORDER — CLONIDINE HYDROCHLORIDE 0.1 MG/1
0.1 TABLET ORAL 2 TIMES DAILY
Qty: 60 TABLET | Refills: 11 | Status: SHIPPED | OUTPATIENT
Start: 2023-06-12 | End: 2023-06-12 | Stop reason: CLARIF

## 2023-06-12 RX ORDER — LIDOCAINE HYDROCHLORIDE 10 MG/ML
5 INJECTION, SOLUTION EPIDURAL; INFILTRATION; INTRACAUDAL; PERINEURAL
Status: COMPLETED | OUTPATIENT
Start: 2023-06-12 | End: 2023-06-12

## 2023-06-12 RX ADMIN — LIDOCAINE HYDROCHLORIDE 50 MG: 10 INJECTION, SOLUTION EPIDURAL; INFILTRATION; INTRACAUDAL; PERINEURAL at 06:06

## 2023-06-12 RX ADMIN — CLONIDINE HYDROCHLORIDE 0.1 MG: 0.1 TABLET ORAL at 06:06

## 2023-06-13 NOTE — ED PROVIDER NOTES
Encounter Date: 6/12/2023       History     Chief Complaint   Patient presents with    Foreign Body in Skin     EMS reports, pt from home who has a sewing needle in her left thumb. TOA 30 minutes PTA     88 yo female here via EMS with complaint of sewing needled through L thumb. Onset just PTA. EMS gave fentanyl in route with some improvement in pain. Aggravated with ROM. No prior similar.     Review of patient's allergies indicates:   Allergen Reactions    Ace inhibitors Anaphylaxis     cough    Belladonna alkaloids Anaphylaxis     Other reaction(s): Low Platelet Count    Librax (with clidinium) [chlordiazepoxide-clidinium] Anaphylaxis    Methotrexate analogues Anaphylaxis and Nausea And Vomiting    Codeine     Codeine sulfate      Other reaction(s): Rash, dizziness    Cymbalta [duloxetine] Hallucinations    Methotrexate sodium      Other reaction(s): wt loss, n/v    Solifenacin Hallucinations     Past Medical History:   Diagnosis Date    Acute cystitis without hematuria 07/02/2021    Altered mental status 07/01/2021    Anemia 07/02/2021    Anxiety     Arthritis     Arthritis, rheumatoid     Bell's palsy     Bursitis of left shoulder     Carpal tunnel syndrome, bilateral     Cataract     Debility 05/24/2021    Elevated troponin 05/17/2021    Erosive osteoarthritis of both hands     Esophagus disorder     needs to have her esophagus stretched again    Fall 05/17/2021    Function kidney decreased     GERD (gastroesophageal reflux disease)     High cholesterol     Hypertension     Hypokalemia 05/20/2021    Hypothyroidism     Kyphosis of cervical region, unspecified kyphosis type     Lumbar pain     Migraine headache     Palpitations     Postmenopausal disorder     Shingles     Thrombocytopenia     Thyroid disease     Weakness 05/17/2021     Past Surgical History:   Procedure Laterality Date    BACK SURGERY      BLADDER SURGERY  03/2006    Dr Robert in [place] stem cell implant to bladder    CATARACT EXTRACTION Left      Dr Barroso 2016     SECTION      COLONOSCOPY  10/2012    Dr Corbett    ESOPHAGOGASTRODUODENOSCOPY  2021    javi ring - dilated, gastroduodenitis, bile reflux - pickard    HYSTERECTOMY      NOSE SURGERY  2021    Dr De Souza in [place] removal of skin cancer    OOPHORECTOMY  1977    TONSILLECTOMY       Family History   Problem Relation Age of Onset    Cancer Mother     Heart disease Father     Heart attack Father     Cancer Maternal Aunt     Stroke Maternal Aunt      Social History     Tobacco Use    Smoking status: Never    Smokeless tobacco: Never   Substance Use Topics    Alcohol use: Not Currently    Drug use: Never     Review of Systems   Constitutional: Negative.    Respiratory: Negative.     Cardiovascular: Negative.    Gastrointestinal: Negative.    All other systems reviewed and are negative.    Physical Exam     Initial Vitals [23 1802]   BP Pulse Resp Temp SpO2   (!) 216/107 71 18 98.6 °F (37 °C) 99 %      MAP       --         Physical Exam    Nursing note and vitals reviewed.  Constitutional: She appears well-developed and well-nourished. She is not diaphoretic. No distress.   HENT:   Head: Normocephalic and atraumatic.   Eyes: EOM are normal. Pupils are equal, round, and reactive to light.   Neck: Neck supple.   Normal range of motion.  Cardiovascular:  Normal rate, regular rhythm, normal heart sounds and intact distal pulses.     Exam reveals no gallop and no friction rub.       No murmur heard.  Pulmonary/Chest: Breath sounds normal. No respiratory distress. She has no wheezes. She has no rales.   Abdominal: Abdomen is soft. Bowel sounds are normal. She exhibits no distension. There is no abdominal tenderness. There is no rebound.   Musculoskeletal:         General: Tenderness (L distal thumb with sewing needle in soft tissue) present. No edema. Normal range of motion.      Cervical back: Normal range of motion and neck supple.     Neurological: She is alert. She has  normal strength and normal reflexes.   Skin: Skin is warm and dry. Capillary refill takes less than 2 seconds.       ED Course   Foreign Body    Date/Time: 6/12/2023 7:08 PM  Performed by: Dickson Royal MD  Authorized by: Dickson Royal MD   Body area: skin  General location: upper extremity  Location details: left thumb  Anesthesia: digital block    Anesthesia:  Local Anesthetic: lidocaine 1% without epinephrine  Anesthetic total: 5 mL  Localization method: visualized  Removal mechanism: hemostat  Dressing: dressing applied  Tendon involvement: none  Depth: subcutaneous  Complexity: simple  1 objects recovered.  Objects recovered: sewing needle  Post-procedure assessment: foreign body removed  Patient tolerance: Patient tolerated the procedure well with no immediate complications    Labs Reviewed - No data to display       Imaging Results              X-Ray Finger 2 or More Views Left (In process)                   X-Rays:   Independently Interpreted Readings:   Other Readings:  L fingers: no fx noted  Medications   LIDOcaine (PF) 10 mg/ml (1%) injection 50 mg (50 mg Infiltration Given 6/12/23 1829)   cloNIDine tablet 0.1 mg (0.1 mg Oral Given 6/12/23 1843)     Medical Decision Making:   Clinical Tests:   Radiological Study: Ordered and Reviewed  ED Management:  Tolerated removal of foreign body well.                         Clinical Impression:   Final diagnoses:  [M79.5] Foreign body (FB) in soft tissue (Primary)        ED Disposition Condition    Discharge Stable          ED Prescriptions       Medication Sig Dispense Start Date End Date Auth. Provider    cloNIDine (CATAPRES) 0.1 MG tablet  (Status: Discontinued) Take 1 tablet (0.1 mg total) by mouth 2 (two) times daily. 60 tablet 6/12/2023 6/12/2023 Dickson Royal MD          Follow-up Information       Follow up With Specialties Details Why Contact Info    Juliet Barclay MD Internal Medicine Schedule an appointment as soon as possible for  a visit   [facility] [facility] [address]  [phone]               Dickson Royal MD  06/12/23 6045

## 2023-07-17 PROBLEM — Z00.00 WELLNESS EXAMINATION: Status: RESOLVED | Noted: 2022-03-29 | Resolved: 2023-07-17

## 2023-09-14 ENCOUNTER — HOSPITAL ENCOUNTER (OUTPATIENT)
Dept: RADIOLOGY | Facility: HOSPITAL | Age: 88
Discharge: HOME | End: 2023-09-14
Payer: MEDICARE

## 2023-09-14 DIAGNOSIS — M79.672 FOOT PAIN, LEFT: ICD-10-CM

## 2023-09-14 PROBLEM — N39.0 FREQUENT UTI: Status: ACTIVE | Noted: 2023-09-14

## 2023-09-14 PROBLEM — N95.1 POSTMENOPAUSAL DISORDER: Status: ACTIVE | Noted: 2023-09-14

## 2023-09-14 PROBLEM — N28.9 DECREASED RENAL FUNCTION: Status: ACTIVE | Noted: 2023-09-14

## 2023-09-14 PROBLEM — N30.00 ACUTE CYSTITIS WITHOUT HEMATURIA: Status: RESOLVED | Noted: 2021-07-02 | Resolved: 2023-09-14

## 2023-09-14 PROBLEM — G51.0 FACIAL WEAKNESS DUE TO LOWER MOTOR NEURON DISORDER: Status: ACTIVE | Noted: 2023-09-14

## 2023-09-14 PROCEDURE — 73630 X-RAY EXAM OF FOOT: CPT | Mod: TC,LT

## 2023-09-22 PROBLEM — R60.9 DEPENDENT EDEMA: Status: ACTIVE | Noted: 2023-09-22

## 2023-11-16 ENCOUNTER — HOSPITAL ENCOUNTER (EMERGENCY)
Facility: HOSPITAL | Age: 88
Discharge: HOME | End: 2023-11-16
Attending: FAMILY MEDICINE
Payer: MEDICARE

## 2023-11-16 VITALS
DIASTOLIC BLOOD PRESSURE: 65 MMHG | WEIGHT: 89 LBS | TEMPERATURE: 98 F | RESPIRATION RATE: 18 BRPM | BODY MASS INDEX: 14.3 KG/M2 | HEART RATE: 63 BPM | SYSTOLIC BLOOD PRESSURE: 144 MMHG | HEIGHT: 66 IN | OXYGEN SATURATION: 97 %

## 2023-11-16 DIAGNOSIS — S00.03XA CONTUSION OF SCALP, INITIAL ENCOUNTER: Primary | ICD-10-CM

## 2023-11-16 DIAGNOSIS — I10 HYPERTENSION, UNSPECIFIED TYPE: ICD-10-CM

## 2023-11-16 DIAGNOSIS — S00.83XA CONTUSION OF FACE, INITIAL ENCOUNTER: ICD-10-CM

## 2023-11-16 DIAGNOSIS — R52 PAIN: ICD-10-CM

## 2023-11-16 DIAGNOSIS — S50.01XA CONTUSION OF RIGHT ELBOW, INITIAL ENCOUNTER: ICD-10-CM

## 2023-11-16 DIAGNOSIS — S80.211A ABRASION OF RIGHT KNEE, INITIAL ENCOUNTER: ICD-10-CM

## 2023-11-16 PROCEDURE — 63600175 PHARM REV CODE 636 W HCPCS: Performed by: FAMILY MEDICINE

## 2023-11-16 PROCEDURE — 90471 IMMUNIZATION ADMIN: CPT | Performed by: FAMILY MEDICINE

## 2023-11-16 PROCEDURE — 25000003 PHARM REV CODE 250: Performed by: FAMILY MEDICINE

## 2023-11-16 PROCEDURE — 96372 THER/PROPH/DIAG INJ SC/IM: CPT | Performed by: FAMILY MEDICINE

## 2023-11-16 PROCEDURE — 90715 TDAP VACCINE 7 YRS/> IM: CPT | Performed by: FAMILY MEDICINE

## 2023-11-16 PROCEDURE — 99285 EMERGENCY DEPT VISIT HI MDM: CPT | Mod: 25

## 2023-11-16 RX ORDER — OLMESARTAN MEDOXOMIL 40 MG/1
40 TABLET ORAL
COMMUNITY
Start: 2023-10-19

## 2023-11-16 RX ORDER — CLONIDINE HYDROCHLORIDE 0.1 MG/1
0.1 TABLET ORAL
Status: COMPLETED | OUTPATIENT
Start: 2023-11-16 | End: 2023-11-16

## 2023-11-16 RX ORDER — KETOROLAC TROMETHAMINE 10 MG/1
10 TABLET, FILM COATED ORAL EVERY 6 HOURS
Qty: 20 TABLET | Refills: 0 | Status: SHIPPED | OUTPATIENT
Start: 2023-11-16 | End: 2023-11-21

## 2023-11-16 RX ORDER — KETOROLAC TROMETHAMINE 30 MG/ML
15 INJECTION, SOLUTION INTRAMUSCULAR; INTRAVENOUS
Status: COMPLETED | OUTPATIENT
Start: 2023-11-16 | End: 2023-11-16

## 2023-11-16 RX ADMIN — CLONIDINE HYDROCHLORIDE 0.1 MG: 0.1 TABLET ORAL at 07:11

## 2023-11-16 RX ADMIN — TETANUS TOXOID, REDUCED DIPHTHERIA TOXOID AND ACELLULAR PERTUSSIS VACCINE, ADSORBED 0.5 ML: 5; 2.5; 8; 8; 2.5 SUSPENSION INTRAMUSCULAR at 06:11

## 2023-11-16 RX ADMIN — KETOROLAC TROMETHAMINE 15 MG: 30 INJECTION, SOLUTION INTRAMUSCULAR; INTRAVENOUS at 06:11

## 2023-11-17 NOTE — ED PROVIDER NOTES
"Encounter Date: 11/16/2023       History     Chief Complaint   Patient presents with    Fall     Fall PTA, per patient "I'm clumsy" abrasion to right knee, right shoulder, right cheek, bruising to right side upper lip temporal region. Denies LOC. Patient reports chronic back pain.       Fall  The accident occurred just prior to arrival. The fall occurred while walking. Distance fallen: Standing. She landed on A hard floor. The point of impact was the head, right elbow and right knee. The pain is present in the right elbow and right knee. The pain is at a severity of 4/10. She was Ambulatory at the scene. There was No entrapment after the fall. There was No drug use involved in the accident. There was No alcohol use involved in the accident. Pertinent negatives include no neck pain, no back pain, no paresthesias, no paralysis, no visual change, no fever, no numbness, no abdominal pain, no bowel incontinence, no nausea, no vomiting, no hematuria, no headaches, no hearing loss, no loss of consciousness and no tingling. The symptoms are aggravated by activity. She has tried nothing for the symptoms.     Review of patient's allergies indicates:   Allergen Reactions    Ace inhibitors Anaphylaxis     cough    Belladonna alkaloids Anaphylaxis     Other reaction(s): Low Platelet Count    Librax (with clidinium) [chlordiazepoxide-clidinium] Anaphylaxis    Methotrexate analogues Anaphylaxis and Nausea And Vomiting    Codeine     Codeine sulfate      Other reaction(s): Rash, dizziness    Cymbalta [duloxetine] Hallucinations    Methotrexate sodium      Other reaction(s): wt loss, n/v    Solifenacin Hallucinations     Past Medical History:   Diagnosis Date    Acute cystitis without hematuria 07/02/2021    Altered mental status 07/01/2021    Anemia 07/02/2021    Anxiety     Arthritis     Arthritis, rheumatoid     Bell's palsy     Bursitis of left shoulder     Carpal tunnel syndrome, bilateral     Cataract     Debility 05/24/2021 "    Elevated troponin 2021    Erosive osteoarthritis of both hands     Esophagus disorder     needs to have her esophagus stretched again    Fall 2021    Function kidney decreased     GERD (gastroesophageal reflux disease)     High cholesterol     Hypertension     Hypokalemia 2021    Hypothyroidism     Kyphosis of cervical region, unspecified kyphosis type     Lumbar pain     Migraine headache     Palpitations     Postmenopausal disorder     Shingles     Thrombocytopenia     Thyroid disease     Weakness 2021     Past Surgical History:   Procedure Laterality Date    BACK SURGERY      BLADDER SURGERY  2006    Dr Robert in [place] stem cell implant to bladder    CATARACT EXTRACTION Left 2010    Dr Barroso 2016     SECTION      COLONOSCOPY  10/2012    Dr Corbett    ESOPHAGOGASTRODUODENOSCOPY  2021    schatzkis ring - dilated, gastroduodenitis, bile reflux - pickard    HYSTERECTOMY      melanoma removal  2023    NOSE SURGERY  2021    Dr De Souza in [place] removal of skin cancer    OOPHORECTOMY  1977    TONSILLECTOMY       Family History   Problem Relation Age of Onset    Cancer Mother     Heart disease Father     Heart attack Father     Cancer Maternal Aunt     Stroke Maternal Aunt      Social History     Tobacco Use    Smoking status: Never    Smokeless tobacco: Never   Substance Use Topics    Alcohol use: Not Currently    Drug use: Never     Review of Systems   Constitutional:  Negative for fever.   Gastrointestinal:  Negative for abdominal pain, bowel incontinence, nausea and vomiting.   Genitourinary:  Negative for hematuria.   Musculoskeletal:  Negative for back pain and neck pain.   Neurological:  Negative for tingling, loss of consciousness, numbness, headaches and paresthesias.   All other systems reviewed and are negative.      Physical Exam     Initial Vitals   BP Pulse Resp Temp SpO2   23 1802 23 1802 23 1802 23 1946 23 1802   (!)  213/91 71 18 98 °F (36.7 °C) 98 %      MAP       --                Physical Exam    Nursing note and vitals reviewed.  Constitutional: Vital signs are normal. She appears well-developed and well-nourished. She is not diaphoretic.  Non-toxic appearance. She does not have a sickly appearance.   HENT:   Head: Normocephalic and atraumatic.   Right Ear: Hearing, tympanic membrane, external ear and ear canal normal.   Left Ear: Tympanic membrane, external ear and ear canal normal.   Mouth/Throat: Uvula is midline, oropharynx is clear and moist and mucous membranes are normal.   Eyes: Conjunctivae, EOM and lids are normal. Pupils are equal, round, and reactive to light. Lids are everted and swept, no foreign bodies found. Right eye exhibits normal extraocular motion and no nystagmus. Left eye exhibits normal extraocular motion and no nystagmus.   Neck: Trachea normal and phonation normal. Neck supple.   Normal range of motion.   Full passive range of motion without pain.     Cardiovascular:  Normal rate, regular rhythm, normal heart sounds, intact distal pulses and normal pulses.           Pulmonary/Chest: Breath sounds normal. No respiratory distress. She has no wheezes. She has no rhonchi. She has no rales. She exhibits no tenderness.   Abdominal: Abdomen is soft. Bowel sounds are normal. There is no abdominal tenderness.   Musculoskeletal:      Right elbow: No swelling, deformity, effusion or lacerations. Normal range of motion. Tenderness present.      Left elbow: No swelling, deformity, effusion or lacerations. Normal range of motion. No tenderness.      Cervical back: Normal, full passive range of motion without pain, normal range of motion and neck supple.      Thoracic back: Normal.      Lumbar back: Normal.      Right knee: Ecchymosis present. Tenderness present over the medial joint line.      Left knee: Normal.     Neurological: She is alert and oriented to person, place, and time. She has normal strength. No  cranial nerve deficit or sensory deficit.   Skin: Skin is intact.        Multiple hematomas and ecchymosis noted in right side of scalp and face   Psychiatric: She has a normal mood and affect. Her speech is normal and behavior is normal.         ED Course   Procedures  Labs Reviewed - No data to display       Imaging Results              CT Head Without Contrast (Final result)  Result time 11/16/23 18:44:29      Final result by Genevieve Kraus MD (11/16/23 18:44:29)                   Impression:      Diffuse chronic changes with no acute intracranial abnormality      Electronically signed by: Genevieve Kraus MD  Date:    11/16/2023  Time:    18:44               Narrative:    EXAMINATION:  CT HEAD WITHOUT CONTRAST    CLINICAL HISTORY:  Facial trauma, blunt;    TECHNIQUE:  Iterative reconstruction technique was performed.    CT/Cardiac Nuclear exams in prior 12 months: 0    COMPARISON:  05/17/2021    FINDINGS:  The skull is intact.  Diffuse volume loss and white matter disease.  No mass lesion acute hemorrhage or acute infarction                                       CT Maxillofacial Without Contrast (Final result)  Result time 11/16/23 18:47:41      Final result by Genevieve Kraus MD (11/16/23 18:47:41)                   Impression:      No acute facial fracture      Electronically signed by: Genevieve Kraus MD  Date:    11/16/2023  Time:    18:47               Narrative:    EXAMINATION:  CT MAXILLOFACIAL WITHOUT CONTRAST    CLINICAL HISTORY:  Facial trauma, blunt;    TECHNIQUE:  Iterative reconstruction technique was used.    CT/Cardiac Nuclear exams in prior 12 months: 0    COMPARISON:  None.    FINDINGS:  Degenerative changes of the cervical spine.  The mandible is intact.  The zygomatic arches are intact.  The nasal bones are intact.  The walls of the maxillary sinuses are intact.  The mandible and mandibular condyles are intact.  The wall of the orbits including the floors are intact                                        X-Ray Shoulder Complete 2 View Right (Final result)  Result time 11/16/23 20:45:45      Final result by Lele Corbett MD (11/16/23 20:45:45)                   Impression:      No acute finding.      Electronically signed by: Lele Corbett MD  Date:    11/16/2023  Time:    20:45               Narrative:    EXAMINATION:  XR SHOULDER COMPLETE 2 OR MORE VIEWS RIGHT    CLINICAL HISTORY:  Pain, fall    COMPARISON:  None    FINDINGS:  No fracture or dislocation identified.  There is mild acromioclavicular degenerative change.  Soft tissues are unremarkable.                        Wet Read by Everett Archuleta Jr., MD (11/16/23 18:38:56, [facility], Emergency Medicine)    No acute fracture or dislocation                                     X-Ray Elbow Complete Right (Final result)  Result time 11/16/23 20:47:11      Final result by Lele Corbett MD (11/16/23 20:47:11)                   Impression:      No acute finding.      Electronically signed by: Lele Corbett MD  Date:    11/16/2023  Time:    20:47               Narrative:    EXAMINATION:  XR ELBOW COMPLETE 3 VIEW RIGHT    CLINICAL HISTORY:  Pain, fall    COMPARISON:  None    FINDINGS:  No fracture or dislocation identified.  No obvious joint effusion.  Soft tissues are unremarkable.                        Wet Read by Everett Archuleta Jr., MD (11/16/23 18:39:13, [facility], Emergency Medicine)    No acute fracture dislocation                                     X-Ray Knee 1 or 2 View Right (Final result)  Result time 11/16/23 20:44:40      Final result by Lele Corbett MD (11/16/23 20:44:40)                   Impression:      No acute finding.      Electronically signed by: Lele Corbett MD  Date:    11/16/2023  Time:    20:44               Narrative:    EXAMINATION:  XR KNEE 1 OR 2 VIEW RIGHT    CLINICAL HISTORY:  Pain, fall    COMPARISON:  None    FINDINGS:  No fracture or dislocation identified.  No obvious joint effusion.   Soft tissues are unremarkable.                        Wet Read by Everett Archuleta Jr., MD (11/16/23 18:39:41, [facility] Department, Emergency Medicine)    No acute fracture or dislocation                                     Medications   ketorolac injection 15 mg (15 mg Intramuscular Given 11/16/23 1847)   Tdap (BOOSTRIX) vaccine injection 0.5 mL (0.5 mLs Intramuscular Given 11/16/23 1846)   cloNIDine tablet 0.1 mg (0.1 mg Oral Given 11/16/23 1906)     Medical Decision Making  Amount and/or Complexity of Data Reviewed  Radiology: ordered and independent interpretation performed.    Risk  Prescription drug management.                          Medical Decision Making:   Differential Diagnosis:   Strain, sprain, contusion, intracranial hemorrhage  Clinical Tests:   Radiological Study: Ordered and Reviewed  ED Management:  Patient reports no pain.  Patient has noted hypertension which did not improve that the pain medicine.  Patient reports she takes Benicar before she goes to sleep at night.  Will treat with clonidine here.      After 30 minutes patient's blood pressure improved.  Discussed with patient need to take medication as prescribed follow-up with PCP for management of chronic hypertension.  Patient reports she understands plan of care has ready for discharge home          Clinical Impression:  Final diagnoses:  [R52] Pain  [S00.03XA] Contusion of scalp, initial encounter (Primary)  [S00.83XA] Contusion of face, initial encounter  [S50.01XA] Contusion of right elbow, initial encounter  [S80.211A] Abrasion of right knee, initial encounter  [I10] Hypertension, unspecified type          ED Disposition Condition    Discharge Stable          ED Prescriptions       Medication Sig Dispense Start Date End Date Auth. Provider    ketorolac (TORADOL) 10 mg tablet Take 1 tablet (10 mg total) by mouth every 6 (six) hours. for 5 days 20 tablet 11/16/2023 11/21/2023 Everett Archuleta Jr., MD          Follow-up  Information       Follow up With Specialties Details Why Contact Info    Juliet Barclay MD Internal Medicine Call in 2 days As needed, For wound re-check, If symptoms worsen [address] [facility] [address]  [phone]               Everett Archuleta Jr., MD  11/17/23 3625

## 2023-12-08 ENCOUNTER — HOSPITAL ENCOUNTER (OUTPATIENT)
Dept: RADIOLOGY | Facility: HOSPITAL | Age: 88
Discharge: HOME | End: 2023-12-08
Attending: INTERNAL MEDICINE
Payer: MEDICARE

## 2023-12-08 VITALS — HEIGHT: 66 IN | BODY MASS INDEX: 14.3 KG/M2 | WEIGHT: 89 LBS

## 2023-12-08 DIAGNOSIS — N63.20 BREAST MASS, LEFT: ICD-10-CM

## 2023-12-08 DIAGNOSIS — N63.25 UNSPECIFIED LUMP IN THE LEFT BREAST, OVERLAPPING QUADRANTS: ICD-10-CM

## 2023-12-08 DIAGNOSIS — N63.0 MASS OF BREAST, UNSPECIFIED LATERALITY: ICD-10-CM

## 2023-12-08 PROCEDURE — 76642 ULTRASOUND BREAST LIMITED: CPT | Mod: TC,LT

## 2023-12-08 PROCEDURE — 77066 DX MAMMO INCL CAD BI: CPT | Mod: TC

## 2023-12-18 PROBLEM — N39.0 FREQUENT UTI: Status: RESOLVED | Noted: 2023-09-14 | Resolved: 2023-12-18

## 2024-07-22 PROBLEM — Z00.00 WELLNESS EXAMINATION: Status: RESOLVED | Noted: 2022-03-29 | Resolved: 2024-07-22

## 2024-10-09 PROBLEM — H92.01 OTALGIA OF RIGHT EAR: Status: ACTIVE | Noted: 2024-10-09

## 2024-10-09 PROBLEM — J32.1 FRONTAL SINUSITIS: Status: ACTIVE | Noted: 2024-10-09

## 2024-10-09 PROBLEM — H66.91 RIGHT OTITIS MEDIA: Status: ACTIVE | Noted: 2024-10-09

## 2024-10-09 PROBLEM — J06.9 URI WITH COUGH AND CONGESTION: Status: ACTIVE | Noted: 2024-10-09

## 2024-10-21 ENCOUNTER — LAB VISIT (OUTPATIENT)
Dept: LAB | Facility: HOSPITAL | Age: 89
End: 2024-10-21
Attending: INTERNAL MEDICINE
Payer: MEDICARE

## 2024-10-21 DIAGNOSIS — E03.9 MYXEDEMA HEART DISEASE: ICD-10-CM

## 2024-10-21 DIAGNOSIS — D63.0 ANEMIA IN NEOPLASTIC DISEASE: Primary | ICD-10-CM

## 2024-10-21 DIAGNOSIS — E78.00 PURE HYPERCHOLESTEROLEMIA: ICD-10-CM

## 2024-10-21 DIAGNOSIS — I51.9 MYXEDEMA HEART DISEASE: ICD-10-CM

## 2024-10-21 LAB
ALBUMIN SERPL BCP-MCNC: 3.4 G/DL (ref 3.5–5.2)
ALP SERPL-CCNC: 42 U/L (ref 55–135)
ALT SERPL W/O P-5'-P-CCNC: 21 U/L (ref 10–44)
ANION GAP SERPL CALC-SCNC: 6 MMOL/L (ref 3–11)
AST SERPL-CCNC: 26 U/L (ref 10–40)
BILIRUB SERPL-MCNC: 0.4 MG/DL (ref 0.1–1)
BUN SERPL-MCNC: 27 MG/DL (ref 8–23)
CALCIUM SERPL-MCNC: 9.2 MG/DL (ref 8.7–10.5)
CHLORIDE SERPL-SCNC: 103 MMOL/L (ref 95–110)
CHOLEST SERPL-MCNC: 160 MG/DL (ref 120–199)
CHOLEST/HDLC SERPL: 1.7 {RATIO} (ref 2–5)
CO2 SERPL-SCNC: 32 MMOL/L (ref 23–29)
CREAT SERPL-MCNC: 0.9 MG/DL (ref 0.5–1.4)
EST. GFR  (NO RACE VARIABLE): >60 ML/MIN/1.73 M^2
GLUCOSE SERPL-MCNC: 78 MG/DL (ref 70–110)
HDLC SERPL-MCNC: 93 MG/DL (ref 40–75)
HDLC SERPL: 58.1 % (ref 20–50)
LDLC SERPL CALC-MCNC: 56.8 MG/DL (ref 63–159)
NONHDLC SERPL-MCNC: 67 MG/DL
POTASSIUM SERPL-SCNC: 4 MMOL/L (ref 3.5–5.1)
PROT SERPL-MCNC: 6.5 G/DL (ref 6–8.4)
SODIUM SERPL-SCNC: 141 MMOL/L (ref 136–145)
TRIGL SERPL-MCNC: 51 MG/DL (ref 30–150)
TSH SERPL DL<=0.005 MIU/L-ACNC: 1.35 UIU/ML (ref 0.4–4)

## 2024-10-21 PROCEDURE — 85027 COMPLETE CBC AUTOMATED: CPT | Performed by: INTERNAL MEDICINE

## 2024-10-21 PROCEDURE — 36415 COLL VENOUS BLD VENIPUNCTURE: CPT | Performed by: INTERNAL MEDICINE

## 2024-10-21 PROCEDURE — 84443 ASSAY THYROID STIM HORMONE: CPT | Performed by: INTERNAL MEDICINE

## 2024-10-21 PROCEDURE — 80053 COMPREHEN METABOLIC PANEL: CPT | Performed by: INTERNAL MEDICINE

## 2024-10-21 PROCEDURE — 80061 LIPID PANEL: CPT | Performed by: INTERNAL MEDICINE

## 2024-10-21 PROCEDURE — 82306 VITAMIN D 25 HYDROXY: CPT | Performed by: INTERNAL MEDICINE

## 2024-10-22 LAB
25(OH)D3+25(OH)D2 SERPL-MCNC: 65 NG/ML (ref 30–96)
ERYTHROCYTE [DISTWIDTH] IN BLOOD BY AUTOMATED COUNT: 13 % (ref 11.5–14.5)
HCT VFR BLD AUTO: 34.5 % (ref 37–48.5)
HGB BLD-MCNC: 11 G/DL (ref 12–16)
MCH RBC QN AUTO: 32.7 PG (ref 27–31)
MCHC RBC AUTO-ENTMCNC: 31.9 G/DL (ref 32–36)
MCV RBC AUTO: 103 FL (ref 82–98)
PLATELET # BLD AUTO: 188 K/UL (ref 150–450)
PMV BLD AUTO: 11.4 FL (ref 9.2–12.9)
RBC # BLD AUTO: 3.36 M/UL (ref 4–5.4)
WBC # BLD AUTO: 5.39 K/UL (ref 3.9–12.7)

## 2024-11-27 ENCOUNTER — HOSPITAL ENCOUNTER (EMERGENCY)
Facility: HOSPITAL | Age: 89
Discharge: HOME | End: 2024-11-27
Attending: EMERGENCY MEDICINE
Payer: MEDICARE

## 2024-11-27 VITALS
HEART RATE: 77 BPM | SYSTOLIC BLOOD PRESSURE: 190 MMHG | HEIGHT: 66 IN | WEIGHT: 98 LBS | OXYGEN SATURATION: 97 % | BODY MASS INDEX: 15.75 KG/M2 | TEMPERATURE: 98 F | RESPIRATION RATE: 18 BRPM | DIASTOLIC BLOOD PRESSURE: 80 MMHG

## 2024-11-27 DIAGNOSIS — G89.29 CHRONIC LEFT-SIDED LOW BACK PAIN WITH LEFT-SIDED SCIATICA: Primary | ICD-10-CM

## 2024-11-27 DIAGNOSIS — R52 BODY ACHES: ICD-10-CM

## 2024-11-27 DIAGNOSIS — M54.42 CHRONIC LEFT-SIDED LOW BACK PAIN WITH LEFT-SIDED SCIATICA: Primary | ICD-10-CM

## 2024-11-27 LAB
ALBUMIN SERPL BCP-MCNC: 3.8 G/DL (ref 3.5–5.2)
ALP SERPL-CCNC: 41 U/L (ref 55–135)
ALT SERPL W/O P-5'-P-CCNC: 29 U/L (ref 10–44)
ANION GAP SERPL CALC-SCNC: 4 MMOL/L (ref 3–11)
AST SERPL-CCNC: 26 U/L (ref 10–40)
BACTERIA #/AREA URNS HPF: NEGATIVE /HPF
BASOPHILS # BLD AUTO: 0.01 K/UL (ref 0–0.2)
BASOPHILS NFR BLD: 0.2 % (ref 0–1.9)
BILIRUB SERPL-MCNC: 0.5 MG/DL (ref 0.1–1)
BILIRUB UR QL STRIP: NEGATIVE
BUN SERPL-MCNC: 18 MG/DL (ref 8–23)
CALCIUM SERPL-MCNC: 9.4 MG/DL (ref 8.7–10.5)
CHLORIDE SERPL-SCNC: 97 MMOL/L (ref 95–110)
CLARITY UR: CLEAR
CO2 SERPL-SCNC: 34 MMOL/L (ref 23–29)
COLOR UR: YELLOW
CREAT SERPL-MCNC: 1 MG/DL (ref 0.5–1.4)
DIFFERENTIAL METHOD BLD: ABNORMAL
EOSINOPHIL # BLD AUTO: 0 K/UL (ref 0–0.5)
EOSINOPHIL NFR BLD: 0.2 % (ref 0–8)
ERYTHROCYTE [DISTWIDTH] IN BLOOD BY AUTOMATED COUNT: 12.5 % (ref 11.5–14.5)
EST. GFR  (NO RACE VARIABLE): 54.5 ML/MIN/1.73 M^2
GLUCOSE SERPL-MCNC: 124 MG/DL (ref 70–110)
GLUCOSE UR QL STRIP: NEGATIVE
HCT VFR BLD AUTO: 34.5 % (ref 37–48.5)
HGB BLD-MCNC: 11.5 G/DL (ref 12–16)
HGB UR QL STRIP: ABNORMAL
HYALINE CASTS #/AREA URNS LPF: 0 /LPF
IMM GRANULOCYTES # BLD AUTO: 0.01 K/UL (ref 0–0.04)
IMM GRANULOCYTES NFR BLD AUTO: 0.2 % (ref 0–0.5)
KETONES UR QL STRIP: NEGATIVE
LEUKOCYTE ESTERASE UR QL STRIP: NEGATIVE
LYMPHOCYTES # BLD AUTO: 0.5 K/UL (ref 1–4.8)
LYMPHOCYTES NFR BLD: 7.4 % (ref 18–48)
MCH RBC QN AUTO: 32.5 PG (ref 27–31)
MCHC RBC AUTO-ENTMCNC: 33.3 G/DL (ref 32–36)
MCV RBC AUTO: 98 FL (ref 82–98)
MICROSCOPIC COMMENT: ABNORMAL
MONOCYTES # BLD AUTO: 0.2 K/UL (ref 0.3–1)
MONOCYTES NFR BLD: 3.6 % (ref 4–15)
NEUTROPHILS # BLD AUTO: 5.9 K/UL (ref 1.8–7.7)
NEUTROPHILS NFR BLD: 88.4 % (ref 38–73)
NITRITE UR QL STRIP: NEGATIVE
NRBC BLD-RTO: 0 /100 WBC
PH UR STRIP: 7 [PH] (ref 5–8)
PLATELET # BLD AUTO: 224 K/UL (ref 150–450)
PMV BLD AUTO: 10.3 FL (ref 9.2–12.9)
POTASSIUM SERPL-SCNC: 4.2 MMOL/L (ref 3.5–5.1)
PROT SERPL-MCNC: 7.2 G/DL (ref 6–8.4)
PROT UR QL STRIP: ABNORMAL
RBC # BLD AUTO: 3.54 M/UL (ref 4–5.4)
RBC #/AREA URNS HPF: 12 /HPF (ref 0–4)
SODIUM SERPL-SCNC: 135 MMOL/L (ref 136–145)
SP GR UR STRIP: 1.01 (ref 1–1.03)
SQUAMOUS #/AREA URNS HPF: 0 /HPF
URN SPEC COLLECT METH UR: ABNORMAL
UROBILINOGEN UR STRIP-ACNC: NEGATIVE EU/DL
WBC # BLD AUTO: 6.65 K/UL (ref 3.9–12.7)
WBC #/AREA URNS HPF: 0 /HPF (ref 0–5)

## 2024-11-27 PROCEDURE — 80053 COMPREHEN METABOLIC PANEL: CPT | Performed by: NURSE PRACTITIONER

## 2024-11-27 PROCEDURE — 25000003 PHARM REV CODE 250: Performed by: NURSE PRACTITIONER

## 2024-11-27 PROCEDURE — 81000 URINALYSIS NONAUTO W/SCOPE: CPT | Performed by: NURSE PRACTITIONER

## 2024-11-27 PROCEDURE — 99283 EMERGENCY DEPT VISIT LOW MDM: CPT

## 2024-11-27 PROCEDURE — 85025 COMPLETE CBC W/AUTO DIFF WBC: CPT | Performed by: NURSE PRACTITIONER

## 2024-11-27 PROCEDURE — 36415 COLL VENOUS BLD VENIPUNCTURE: CPT | Performed by: NURSE PRACTITIONER

## 2024-11-27 RX ORDER — HYDROCODONE BITARTRATE AND ACETAMINOPHEN 5; 325 MG/1; MG/1
1 TABLET ORAL
Status: COMPLETED | OUTPATIENT
Start: 2024-11-27 | End: 2024-11-27

## 2024-11-27 RX ORDER — TRAMADOL HYDROCHLORIDE 50 MG/1
50 TABLET ORAL
Status: COMPLETED | OUTPATIENT
Start: 2024-11-27 | End: 2024-11-27

## 2024-11-27 RX ORDER — MENTHOL AND ZINC OXIDE .44; 20.625 G/100G; G/100G
OINTMENT TOPICAL EVERY 4 HOURS PRN
Qty: 71 G | Refills: 0 | Status: SHIPPED | OUTPATIENT
Start: 2024-11-27 | End: 2024-12-07

## 2024-11-27 RX ORDER — HYDROCODONE BITARTRATE AND ACETAMINOPHEN 5; 325 MG/1; MG/1
1 TABLET ORAL EVERY 12 HOURS PRN
Qty: 8 TABLET | Refills: 0 | Status: SHIPPED | OUTPATIENT
Start: 2024-11-27 | End: 2024-12-03

## 2024-11-27 RX ADMIN — TRAMADOL HYDROCHLORIDE 50 MG: 50 TABLET, COATED ORAL at 02:11

## 2024-11-27 RX ADMIN — HYDROCODONE BITARTRATE AND ACETAMINOPHEN 1 TABLET: 5; 325 TABLET ORAL at 03:11

## 2024-11-27 NOTE — DISCHARGE INSTRUCTIONS
Take medication as directed.  Take home medications as directed and plan to see your doctor in 2 days for further evaluation of your symptoms.  Return to the emergency department for worsening condition.

## 2024-11-27 NOTE — ED PROVIDER NOTES
"Encounter Date: 11/27/2024       History     Chief Complaint   Patient presents with    Generalized Body Aches     Pt reports generalized body aches and pains for about 3 days. Pt reports she seen her PCP was given deltasone and robaxin.Pt reports the medicine is not helping with the back pain.      This is an 89-year-old white female with a history of anxiety, hypertension, and chronic back pain who presents to the emergency department with complaints of generalized pain.  Over the last 3 days patient has been experiencing worsening chronic back pain, characterized by relatively constant left lower back pain radiating into left buttock and leg, shooting and sharp in nature.  She also reports generalized body aches "all over", left side worse than right with no known injury.  She was evaluated by her PCP and given steroid injection with oral steroids and Robaxin without improvement in symptoms.  She does admit to more frequent urination than usual.  She denies fever, appetite changes, nausea/vomiting, abdominal pain, or diarrhea.      Review of patient's allergies indicates:   Allergen Reactions    Ace inhibitors Anaphylaxis     cough    Belladonna alkaloids Anaphylaxis     Other reaction(s): Low Platelet Count    Librax (with clidinium) [chlordiazepoxide-clidinium] Anaphylaxis    Methotrexate analogues Anaphylaxis and Nausea And Vomiting    Codeine     Codeine sulfate      Other reaction(s): Rash, dizziness    Cymbalta [duloxetine] Hallucinations    Methotrexate sodium      Other reaction(s): wt loss, n/v    Solifenacin Hallucinations     Past Medical History:   Diagnosis Date    Acute cystitis without hematuria 07/02/2021    Altered mental status 07/01/2021    Anemia 07/02/2021    Anxiety     Arthritis     Arthritis, rheumatoid     Bell's palsy     Bursitis of left shoulder     Carpal tunnel syndrome, bilateral     Cataract     Debility 05/24/2021    Elevated troponin 05/17/2021    Erosive osteoarthritis of both " hands     Esophagus disorder     needs to have her esophagus stretched again    Fall 2021    Function kidney decreased     GERD (gastroesophageal reflux disease)     High cholesterol     Hypertension     Hypokalemia 2021    Hypothyroidism     Kyphosis of cervical region, unspecified kyphosis type     Lumbar pain     Migraine headache     Palpitations     Postmenopausal disorder     Shingles     Thrombocytopenia     Thyroid disease     Weakness 2021     Past Surgical History:   Procedure Laterality Date    BACK SURGERY      BLADDER SURGERY  2006    Dr Robert in [place] stem cell implant to bladder    CATARACT EXTRACTION Left 2010    Dr Barroso 2016     SECTION      COLONOSCOPY  10/2012    Dr Corbett    ESOPHAGOGASTRODUODENOSCOPY  2021    javi ring - dilated, gastroduodenitis, bile reflux - pickard    HYSTERECTOMY      melanoma removal  2023    NOSE SURGERY  2021    Dr De Souza in [place] removal of skin cancer    OOPHORECTOMY  1977    TONSILLECTOMY       Family History   Problem Relation Name Age of Onset    Cancer Mother      Heart disease Father      Heart attack Father      No Known Problems Sister      No Known Problems Daughter      Cancer Maternal Aunt      Stroke Maternal Aunt      No Known Problems Maternal Uncle      No Known Problems Paternal Aunt      No Known Problems Paternal Uncle      No Known Problems Maternal Grandmother      No Known Problems Maternal Grandfather      No Known Problems Paternal Grandmother      No Known Problems Paternal Grandfather      No Known Problems Other      Breast cancer Neg Hx      Ovarian cancer Neg Hx      BRCA 1/2 Neg Hx       Social History     Tobacco Use    Smoking status: Never    Smokeless tobacco: Never   Substance Use Topics    Alcohol use: Not Currently    Drug use: Never     Review of Systems   Constitutional:  Negative for appetite change and fever.   HENT:  Negative for congestion.    Respiratory:  Negative for  cough.    Gastrointestinal:  Negative for diarrhea and vomiting.   Genitourinary:  Positive for frequency.   Musculoskeletal:  Positive for back pain.   Neurological:  Negative for numbness.       Physical Exam     Initial Vitals   BP Pulse Resp Temp SpO2   11/27/24 1342 11/27/24 1339 11/27/24 1339 11/27/24 1343 11/27/24 1339   (!) 178/94 74 18 97.7 °F (36.5 °C) 97 %      MAP       --                Physical Exam    Nursing note and vitals reviewed.  Constitutional: She appears well-developed and well-nourished. She is active. No distress.   HENT:   Head: Normocephalic and atraumatic.   Eyes: EOM are normal. Pupils are equal, round, and reactive to light.   Neck: Neck supple.   Normal range of motion.  Cardiovascular:  Normal rate, regular rhythm and normal heart sounds.           Pulmonary/Chest: Breath sounds normal. No respiratory distress.   Genitourinary:       Musculoskeletal:         General: No tenderness. Normal range of motion.      Cervical back: Normal range of motion and neck supple.      Comments: The low back appears normal upon inspection, no rash or discoloration noted.  Patient is nontender to palpation, no central vertebral prominence tenderness.     Neurological: She is alert and oriented to person, place, and time. She has normal strength. GCS score is 15. GCS eye subscore is 4. GCS verbal subscore is 5. GCS motor subscore is 6.   Skin: Skin is warm and dry. Capillary refill takes less than 2 seconds.   Psychiatric: She has a normal mood and affect. Her behavior is normal. Thought content normal.         ED Course   Procedures  Labs Reviewed   CBC W/ AUTO DIFFERENTIAL - Abnormal       Result Value    WBC 6.65      RBC 3.54 (*)     Hemoglobin 11.5 (*)     Hematocrit 34.5 (*)     MCV 98      MCH 32.5 (*)     MCHC 33.3      RDW 12.5      Platelets 224      MPV 10.3      Immature Granulocytes 0.2      Gran # (ANC) 5.9      Immature Grans (Abs) 0.01      Lymph # 0.5 (*)     Mono # 0.2 (*)     Eos #  0.0      Baso # 0.01      nRBC 0      Gran % 88.4 (*)     Lymph % 7.4 (*)     Mono % 3.6 (*)     Eosinophil % 0.2      Basophil % 0.2      Differential Method Automated     COMPREHENSIVE METABOLIC PANEL - Abnormal    Sodium 135 (*)     Potassium 4.2      Chloride 97      CO2 34 (*)     Glucose 124 (*)     BUN 18      Creatinine 1.0      Calcium 9.4      Total Protein 7.2      Albumin 3.8      Total Bilirubin 0.5      Alkaline Phosphatase 41 (*)     AST 26      ALT 29      eGFR 54.5 (*)     Anion Gap 4     URINALYSIS, REFLEX TO URINE CULTURE - Abnormal    Specimen UA Urine, Clean Catch      Color, UA Yellow      Appearance, UA Clear      pH, UA 7.0      Specific Gravity, UA 1.015      Protein, UA 1+ (*)     Glucose, UA Negative      Ketones, UA Negative      Bilirubin (UA) Negative      Occult Blood UA Trace (*)     Nitrite, UA Negative      Urobilinogen, UA Negative      Leukocytes, UA Negative      Narrative:     Preferred Collection Type->Urine, Clean Catch  Specimen Source->Urine   URINALYSIS MICROSCOPIC - Abnormal    RBC, UA 12 (*)     WBC, UA 0      Bacteria Negative      Squam Epithel, UA 0      Hyaline Casts, UA 0.0 (*)     Microscopic Comment SEE COMMENT      Narrative:     Preferred Collection Type->Urine, Clean Catch  Specimen Source->Urine          Imaging Results    None          Medications   traMADoL tablet 50 mg (50 mg Oral Given 11/27/24 1425)   HYDROcodone-acetaminophen 5-325 mg per tablet 1 tablet (1 tablet Oral Given 11/27/24 1526)     Medical Decision Making             ED Course as of 11/27/24 1603   Wed Nov 27, 2024   1559 All labs relatively unremarkable, no sign of infection or UTI.  Patient reports improvement in symptoms after receiving pain medication here in the ED. patient does have an appointment to follow-up with ortho for continued low back pain.  Patient to return to ED for worsening. [CB]      ED Course User Index  [CB] Dyan Espinoza NP                           Clinical  Impression:  Final diagnoses:  [M54.42, G89.29] Chronic left-sided low back pain with left-sided sciatica (Primary)  [R52] Body aches          ED Disposition Condition    Discharge Stable          ED Prescriptions       Medication Sig Dispense Start Date End Date Auth. Provider    menthol-zinc oxide (CALMOSEPTINE) 0.44-20.6 % Oint Apply topically every 4 (four) hours as needed (diaper rash). 71 g 11/27/2024 12/7/2024 Dyan Espinoza NP    HYDROcodone-acetaminophen (NORCO) 5-325 mg per tablet Take 1 tablet by mouth every 12 (twelve) hours as needed for Pain. 8 tablet 11/27/2024 12/1/2024 Dyan Espinoza NP          Follow-up Information       Follow up With Specialties Details Why Contact Info    PCP Follow UP  Schedule an appointment as soon as possible for a visit in 2 days for follow-up, for re-evaluation of today's complaint              Dyan Espinoza NP  11/27/24 7719

## 2024-12-01 ENCOUNTER — HOSPITAL ENCOUNTER (EMERGENCY)
Facility: HOSPITAL | Age: 89
Discharge: HOME | End: 2024-12-01
Attending: STUDENT IN AN ORGANIZED HEALTH CARE EDUCATION/TRAINING PROGRAM
Payer: MEDICARE

## 2024-12-01 VITALS
HEART RATE: 75 BPM | BODY MASS INDEX: 15.75 KG/M2 | SYSTOLIC BLOOD PRESSURE: 160 MMHG | HEIGHT: 66 IN | WEIGHT: 98 LBS | OXYGEN SATURATION: 97 % | RESPIRATION RATE: 18 BRPM | DIASTOLIC BLOOD PRESSURE: 86 MMHG | TEMPERATURE: 99 F

## 2024-12-01 DIAGNOSIS — M54.42 LEFT-SIDED LOW BACK PAIN WITH LEFT-SIDED SCIATICA, UNSPECIFIED CHRONICITY: Primary | ICD-10-CM

## 2024-12-01 PROCEDURE — 99284 EMERGENCY DEPT VISIT MOD MDM: CPT

## 2024-12-01 PROCEDURE — 25000003 PHARM REV CODE 250: Performed by: STUDENT IN AN ORGANIZED HEALTH CARE EDUCATION/TRAINING PROGRAM

## 2024-12-01 RX ORDER — TRAMADOL HYDROCHLORIDE 50 MG/1
50 TABLET ORAL EVERY 12 HOURS PRN
Qty: 10 TABLET | Refills: 0 | Status: SHIPPED | OUTPATIENT
Start: 2024-12-01 | End: 2024-12-06

## 2024-12-01 RX ORDER — LIDOCAINE 50 MG/G
1 PATCH TOPICAL DAILY
Qty: 7 PATCH | Refills: 0 | Status: SHIPPED | OUTPATIENT
Start: 2024-12-01 | End: 2024-12-08

## 2024-12-01 RX ORDER — KETOROLAC TROMETHAMINE 10 MG/1
10 TABLET, FILM COATED ORAL
Status: COMPLETED | OUTPATIENT
Start: 2024-12-01 | End: 2024-12-01

## 2024-12-01 RX ORDER — LIDOCAINE 50 MG/G
1 PATCH TOPICAL
Status: DISCONTINUED | OUTPATIENT
Start: 2024-12-01 | End: 2024-12-01 | Stop reason: HOSPADM

## 2024-12-01 RX ADMIN — LIDOCAINE 5% 1 PATCH: 700 PATCH TOPICAL at 05:12

## 2024-12-01 RX ADMIN — KETOROLAC TROMETHAMINE 10 MG: 10 TABLET, FILM COATED ORAL at 05:12

## 2024-12-01 NOTE — DISCHARGE INSTRUCTIONS
Discontinue use of prednisone and Robaxin.  Use tramadol for pain relief.  Follow up with Dr. Barclay for re-evaluation in the outpatient setting

## 2024-12-01 NOTE — ED PROVIDER NOTES
History  Chief Complaint   Patient presents with    Back Pain     Acute on chronic left lumbar back pain radiating to left hip. Took Norco 5-325 mg and Robaxin 500 mg @0100 w/o relief (10/10 pain score). Currently taking Norco, prednsisone, Robaxin and Advil at home for pain management.     89-year-old female with history of arthritis, hypertension, hyperlipidemia and hypothyroidism who presents for evaluation of back pain.  Patient does have a history of back pain.  Patient notes she was status post multiple surgical interventions including rods placed in the back.  Patient had acute exacerbation of symptoms on 11/27 which prompted her to come in for evaluation.  No falls or trauma reported.  No systemic symptoms such as fevers, chills, cough congestion reported.  Patient noted symptoms just started while she was sitting down.  Patient seen here and given prescription for pain control with improvement.  Patient noted that she took her pain medicine as well as Robaxin and steroid last night as pain acutely worsened but no significant relief noted.  Patient presents for further evaluation for pain        Past Medical History:   Diagnosis Date    Acute cystitis without hematuria 07/02/2021    Altered mental status 07/01/2021    Anemia 07/02/2021    Anxiety     Arthritis     Arthritis, rheumatoid     Bell's palsy     Bursitis of left shoulder     Carpal tunnel syndrome, bilateral     Cataract     Debility 05/24/2021    Elevated troponin 05/17/2021    Erosive osteoarthritis of both hands     Esophagus disorder     needs to have her esophagus stretched again    Fall 05/17/2021    Function kidney decreased     GERD (gastroesophageal reflux disease)     High cholesterol     Hypertension     Hypokalemia 05/20/2021    Hypothyroidism     Kyphosis of cervical region, unspecified kyphosis type     Lumbar pain     Migraine headache     Palpitations     Postmenopausal disorder     Shingles     Thrombocytopenia     Thyroid  "disease     Weakness 2021       Past Surgical History:   Procedure Laterality Date    BACK SURGERY      BLADDER SURGERY  2006    Dr Robert in [place] stem cell implant to bladder    CATARACT EXTRACTION Left 2010    Dr Barroso 2016     SECTION      COLONOSCOPY  10/2012    Dr Corbett    ESOPHAGOGASTRODUODENOSCOPY  2021    javi ring - dilated, gastroduodenitis, bile reflux - pickard    HYSTERECTOMY      melanoma removal  2023    NOSE SURGERY  2021    Dr De Souza in [place] removal of skin cancer    OOPHORECTOMY  1977    TONSILLECTOMY         Family History   Problem Relation Name Age of Onset    Cancer Mother      Heart disease Father      Heart attack Father      No Known Problems Sister      No Known Problems Daughter      Cancer Maternal Aunt      Stroke Maternal Aunt      No Known Problems Maternal Uncle      No Known Problems Paternal Aunt      No Known Problems Paternal Uncle      No Known Problems Maternal Grandmother      No Known Problems Maternal Grandfather      No Known Problems Paternal Grandmother      No Known Problems Paternal Grandfather      No Known Problems Other      Breast cancer Neg Hx      Ovarian cancer Neg Hx      BRCA 1/2 Neg Hx         Social History     Tobacco Use    Smoking status: Never    Smokeless tobacco: Never   Substance Use Topics    Alcohol use: Not Currently    Drug use: Never       ROS  Review of Systems   Musculoskeletal:  Positive for back pain.       Physical Exam  BP (!) 160/86 (BP Location: Left arm)   Pulse 75   Temp 98.9 °F (37.2 °C) (Oral)   Resp 18   Ht 5' 6" (1.676 m)   Wt 44.5 kg (98 lb)   SpO2 97%   Breastfeeding No   BMI 15.82 kg/m²   Physical Exam    Constitutional: She appears well-developed and well-nourished. She is cooperative.   HENT:   Head: Normocephalic and atraumatic.   Eyes: Conjunctivae, EOM and lids are normal. Pupils are equal, round, and reactive to light.   Neck: Phonation normal.   Normal range of " motion.  Cardiovascular:  Normal rate, regular rhythm and intact distal pulses.           Musculoskeletal:         General: Tenderness present.      Cervical back: Normal range of motion.      Lumbar back: Tenderness present.        Back:      Neurological: She is alert and oriented to person, place, and time.   Skin: Skin is warm and dry.   Psychiatric: She has a normal mood and affect. Her speech is normal and behavior is normal.               Labs Reviewed - No data to display        Imaging Results    None                     Procedures             Medical Decision Making  Suspect pain  is musculoskeletal in nature, likely due to arthritis. Will give medication for sx relief and reassess.     Risk  Prescription drug management.               ED Course as of 12/01/24 1849   Sun Dec 01, 2024   0550 Patient noted symptom improvement after medication administration.  Will discharge with prescription for the outpatient setting.  Advised patient to refrain from using prednisone given risk for GI upset.  Patient also advised to take antacids, her famotidine with the Toradol as well as eat food to prevent GI upset.  Patient may follow-up with Dr. Castellon or back and spine clinic in the outpatient setting [NA]      ED Course User Index  [NA] Clement Alexander MD       DISCHARGE NOTE:       Katarzyna Warner's  results have been reviewed with her.  She has been counseled regarding her diagnosis, treatment, and plan.  She verbally conveys understanding and agreement of the signs, symptoms, diagnosis, treatment and prognosis and additionally agrees to follow up as discussed.  She also agrees with the care-plan and conveys that all of her questions have been answered.  I have also provided discharge instructions for her that include: educational information regarding their diagnosis and treatment, and list of reasons why they would want to return to the ED prior to their follow-up appointment, should her condition change.  She has been provided with education for proper emergency department utilization.      CLINICAL IMPRESSION:         1. Left-sided low back pain with left-sided sciatica, unspecified chronicity              PLAN:   1. Discharge  2.   Discharge Medication List as of 12/1/2024  6:06 AM        START taking these medications    Details   LIDOcaine (LIDODERM) 5 % Place 1 patch onto the skin once daily. Remove & Discard patch within 12 hours or as directed by MD for 7 days, Starting Sun 12/1/2024, Until Sun 12/8/2024, Normal      traMADoL (ULTRAM) 50 mg tablet Take 1 tablet (50 mg total) by mouth every 12 (twelve) hours as needed for Pain., Starting Sun 12/1/2024, Until Fri 12/6/2024 at 2359, Normal           3. Juliet Barclay MD  [facility] [facility] [address]  [phone]    Schedule an appointment as soon as possible for a visit   As needed          Clement Alexander MD  12/01/24 6404

## 2024-12-03 ENCOUNTER — HOSPITAL ENCOUNTER (OUTPATIENT)
Dept: RADIOLOGY | Facility: HOSPITAL | Age: 89
Discharge: HOME | End: 2024-12-03
Attending: INTERNAL MEDICINE
Payer: MEDICARE

## 2024-12-03 DIAGNOSIS — M54.9 BACK PAIN, UNSPECIFIED BACK LOCATION, UNSPECIFIED BACK PAIN LATERALITY, UNSPECIFIED CHRONICITY: ICD-10-CM

## 2024-12-03 PROBLEM — R31.9 HEMATURIA: Status: ACTIVE | Noted: 2024-12-03

## 2024-12-03 PROCEDURE — 72131 CT LUMBAR SPINE W/O DYE: CPT | Mod: TC

## 2024-12-03 PROCEDURE — 72128 CT CHEST SPINE W/O DYE: CPT | Mod: TC

## 2024-12-04 ENCOUNTER — LAB VISIT (OUTPATIENT)
Dept: LAB | Facility: HOSPITAL | Age: 89
End: 2024-12-04
Attending: INTERNAL MEDICINE
Payer: MEDICARE

## 2024-12-04 DIAGNOSIS — N39.0 URINARY TRACT INFECTION, SITE NOT SPECIFIED: ICD-10-CM

## 2024-12-04 DIAGNOSIS — E78.00 PURE HYPERCHOLESTEROLEMIA: ICD-10-CM

## 2024-12-04 DIAGNOSIS — D63.0 ANEMIA IN NEOPLASTIC DISEASE: Primary | ICD-10-CM

## 2024-12-04 DIAGNOSIS — I25.10 CORONARY ATHEROSCLEROSIS OF NATIVE CORONARY ARTERY: ICD-10-CM

## 2024-12-04 LAB
ALBUMIN SERPL BCP-MCNC: 3.6 G/DL (ref 3.5–5.2)
ALP SERPL-CCNC: 37 U/L (ref 55–135)
ALT SERPL W/O P-5'-P-CCNC: 22 U/L (ref 10–44)
ANION GAP SERPL CALC-SCNC: 7 MMOL/L (ref 3–11)
AST SERPL-CCNC: 21 U/L (ref 10–40)
BACTERIA #/AREA URNS HPF: NORMAL /HPF
BILIRUB SERPL-MCNC: 0.5 MG/DL (ref 0.1–1)
BILIRUB UR QL STRIP: NEGATIVE
BUN SERPL-MCNC: 28 MG/DL (ref 8–23)
CALCIUM SERPL-MCNC: 9.4 MG/DL (ref 8.7–10.5)
CHLORIDE SERPL-SCNC: 102 MMOL/L (ref 95–110)
CHOLEST SERPL-MCNC: 173 MG/DL (ref 120–199)
CHOLEST/HDLC SERPL: 1.6 {RATIO} (ref 2–5)
CLARITY UR: CLEAR
CO2 SERPL-SCNC: 33 MMOL/L (ref 23–29)
COLOR UR: YELLOW
CREAT SERPL-MCNC: 1.3 MG/DL (ref 0.5–1.4)
EST. GFR  (NO RACE VARIABLE): 39.8 ML/MIN/1.73 M^2
GLUCOSE SERPL-MCNC: 92 MG/DL (ref 70–110)
GLUCOSE UR QL STRIP: NEGATIVE
HDLC SERPL-MCNC: 107 MG/DL (ref 40–75)
HDLC SERPL: 61.8 % (ref 20–50)
HGB UR QL STRIP: NEGATIVE
HYALINE CASTS #/AREA URNS LPF: 0 /LPF
KETONES UR QL STRIP: NEGATIVE
LDLC SERPL CALC-MCNC: 50.2 MG/DL (ref 63–159)
LEUKOCYTE ESTERASE UR QL STRIP: ABNORMAL
MICROSCOPIC COMMENT: NORMAL
NITRITE UR QL STRIP: NEGATIVE
NONHDLC SERPL-MCNC: 66 MG/DL
PH UR STRIP: 6 [PH] (ref 5–8)
POTASSIUM SERPL-SCNC: 4.2 MMOL/L (ref 3.5–5.1)
PROT SERPL-MCNC: 6.6 G/DL (ref 6–8.4)
PROT UR QL STRIP: NEGATIVE
RBC #/AREA URNS HPF: 0 /HPF (ref 0–4)
SODIUM SERPL-SCNC: 142 MMOL/L (ref 136–145)
SP GR UR STRIP: 1.02 (ref 1–1.03)
SQUAMOUS #/AREA URNS HPF: 10 /HPF
TRIGL SERPL-MCNC: 79 MG/DL (ref 30–150)
URN SPEC COLLECT METH UR: ABNORMAL
UROBILINOGEN UR STRIP-ACNC: NEGATIVE EU/DL
WBC #/AREA URNS HPF: 4 /HPF (ref 0–5)

## 2024-12-04 PROCEDURE — 80053 COMPREHEN METABOLIC PANEL: CPT | Performed by: INTERNAL MEDICINE

## 2024-12-04 PROCEDURE — 36415 COLL VENOUS BLD VENIPUNCTURE: CPT | Performed by: INTERNAL MEDICINE

## 2024-12-04 PROCEDURE — 81000 URINALYSIS NONAUTO W/SCOPE: CPT | Performed by: INTERNAL MEDICINE

## 2024-12-04 PROCEDURE — 80061 LIPID PANEL: CPT | Performed by: INTERNAL MEDICINE

## 2024-12-04 PROCEDURE — 87086 URINE CULTURE/COLONY COUNT: CPT | Performed by: INTERNAL MEDICINE

## 2024-12-06 LAB
APO B SERPL-MCNC: 67 MG/DL
BACTERIA UR CULT: NO GROWTH